# Patient Record
Sex: FEMALE | Race: WHITE | NOT HISPANIC OR LATINO | Employment: OTHER | URBAN - METROPOLITAN AREA
[De-identification: names, ages, dates, MRNs, and addresses within clinical notes are randomized per-mention and may not be internally consistent; named-entity substitution may affect disease eponyms.]

---

## 2021-10-16 ENCOUNTER — APPOINTMENT (OUTPATIENT)
Dept: GENERAL RADIOLOGY | Facility: CLINIC | Age: 86
DRG: 481 | End: 2021-10-16
Attending: EMERGENCY MEDICINE
Payer: COMMERCIAL

## 2021-10-16 ENCOUNTER — HOSPITAL ENCOUNTER (INPATIENT)
Facility: CLINIC | Age: 86
LOS: 4 days | Discharge: NURSING, REHAB OR LONG-TERM CARE | DRG: 481 | End: 2021-10-20
Attending: EMERGENCY MEDICINE | Admitting: INTERNAL MEDICINE
Payer: COMMERCIAL

## 2021-10-16 DIAGNOSIS — I10 BENIGN ESSENTIAL HYPERTENSION: Primary | ICD-10-CM

## 2021-10-16 DIAGNOSIS — K30 INDIGESTION: ICD-10-CM

## 2021-10-16 DIAGNOSIS — S72.001A FRACTURE OF HIP, RIGHT, CLOSED, INITIAL ENCOUNTER (H): ICD-10-CM

## 2021-10-16 DIAGNOSIS — D64.9 ANEMIA, UNSPECIFIED TYPE: ICD-10-CM

## 2021-10-16 DIAGNOSIS — R01.1 HEART MURMUR: ICD-10-CM

## 2021-10-16 LAB
ABO/RH(D): NORMAL
ANION GAP SERPL CALCULATED.3IONS-SCNC: 5 MMOL/L (ref 3–14)
ANTIBODY SCREEN: NEGATIVE
BASOPHILS # BLD AUTO: 0 10E3/UL (ref 0–0.2)
BASOPHILS NFR BLD AUTO: 0 %
BUN SERPL-MCNC: 29 MG/DL (ref 7–30)
CALCIUM SERPL-MCNC: 8.6 MG/DL (ref 8.5–10.1)
CHLORIDE BLD-SCNC: 104 MMOL/L (ref 94–109)
CO2 SERPL-SCNC: 26 MMOL/L (ref 20–32)
CREAT SERPL-MCNC: 1.15 MG/DL (ref 0.52–1.04)
EOSINOPHIL # BLD AUTO: 0.2 10E3/UL (ref 0–0.7)
EOSINOPHIL NFR BLD AUTO: 2 %
ERYTHROCYTE [DISTWIDTH] IN BLOOD BY AUTOMATED COUNT: 11.9 % (ref 10–15)
GFR SERPL CREATININE-BSD FRML MDRD: 41 ML/MIN/1.73M2
GLUCOSE BLD-MCNC: 144 MG/DL (ref 70–99)
HCT VFR BLD AUTO: 34.6 % (ref 35–47)
HGB BLD-MCNC: 11.5 G/DL (ref 11.7–15.7)
IMM GRANULOCYTES # BLD: 0 10E3/UL
IMM GRANULOCYTES NFR BLD: 0 %
LYMPHOCYTES # BLD AUTO: 1.7 10E3/UL (ref 0.8–5.3)
LYMPHOCYTES NFR BLD AUTO: 19 %
MCH RBC QN AUTO: 33.6 PG (ref 26.5–33)
MCHC RBC AUTO-ENTMCNC: 33.2 G/DL (ref 31.5–36.5)
MCV RBC AUTO: 101 FL (ref 78–100)
MONOCYTES # BLD AUTO: 1 10E3/UL (ref 0–1.3)
MONOCYTES NFR BLD AUTO: 11 %
NEUTROPHILS # BLD AUTO: 5.7 10E3/UL (ref 1.6–8.3)
NEUTROPHILS NFR BLD AUTO: 68 %
NRBC # BLD AUTO: 0 10E3/UL
NRBC BLD AUTO-RTO: 0 /100
PLATELET # BLD AUTO: 242 10E3/UL (ref 150–450)
POTASSIUM BLD-SCNC: 3.9 MMOL/L (ref 3.4–5.3)
RBC # BLD AUTO: 3.42 10E6/UL (ref 3.8–5.2)
SARS-COV-2 RNA RESP QL NAA+PROBE: NEGATIVE
SODIUM SERPL-SCNC: 135 MMOL/L (ref 133–144)
SPECIMEN EXPIRATION DATE: NORMAL
WBC # BLD AUTO: 8.6 10E3/UL (ref 4–11)

## 2021-10-16 PROCEDURE — 99285 EMERGENCY DEPT VISIT HI MDM: CPT | Mod: 25

## 2021-10-16 PROCEDURE — 36415 COLL VENOUS BLD VENIPUNCTURE: CPT | Performed by: EMERGENCY MEDICINE

## 2021-10-16 PROCEDURE — 250N000013 HC RX MED GY IP 250 OP 250 PS 637: Performed by: EMERGENCY MEDICINE

## 2021-10-16 PROCEDURE — 250N000011 HC RX IP 250 OP 636: Performed by: INTERNAL MEDICINE

## 2021-10-16 PROCEDURE — 120N000001 HC R&B MED SURG/OB

## 2021-10-16 PROCEDURE — 71045 X-RAY EXAM CHEST 1 VIEW: CPT

## 2021-10-16 PROCEDURE — 36415 COLL VENOUS BLD VENIPUNCTURE: CPT | Performed by: INTERNAL MEDICINE

## 2021-10-16 PROCEDURE — 85025 COMPLETE CBC W/AUTO DIFF WBC: CPT | Performed by: EMERGENCY MEDICINE

## 2021-10-16 PROCEDURE — C9803 HOPD COVID-19 SPEC COLLECT: HCPCS

## 2021-10-16 PROCEDURE — 87635 SARS-COV-2 COVID-19 AMP PRB: CPT | Performed by: EMERGENCY MEDICINE

## 2021-10-16 PROCEDURE — 73502 X-RAY EXAM HIP UNI 2-3 VIEWS: CPT

## 2021-10-16 PROCEDURE — 99223 1ST HOSP IP/OBS HIGH 75: CPT | Mod: AI | Performed by: INTERNAL MEDICINE

## 2021-10-16 PROCEDURE — 250N000013 HC RX MED GY IP 250 OP 250 PS 637: Performed by: INTERNAL MEDICINE

## 2021-10-16 PROCEDURE — 86901 BLOOD TYPING SEROLOGIC RH(D): CPT | Performed by: EMERGENCY MEDICINE

## 2021-10-16 PROCEDURE — 80048 BASIC METABOLIC PNL TOTAL CA: CPT | Performed by: EMERGENCY MEDICINE

## 2021-10-16 RX ORDER — METOPROLOL SUCCINATE 25 MG/1
12.5 TABLET, EXTENDED RELEASE ORAL DAILY
COMMUNITY

## 2021-10-16 RX ORDER — SIMVASTATIN 40 MG
40 TABLET ORAL AT BEDTIME
COMMUNITY
End: 2024-08-19

## 2021-10-16 RX ORDER — ONDANSETRON 4 MG/1
4 TABLET, ORALLY DISINTEGRATING ORAL EVERY 6 HOURS PRN
Status: DISCONTINUED | OUTPATIENT
Start: 2021-10-16 | End: 2021-10-17

## 2021-10-16 RX ORDER — ONDANSETRON 2 MG/ML
4 INJECTION INTRAMUSCULAR; INTRAVENOUS EVERY 6 HOURS PRN
Status: DISCONTINUED | OUTPATIENT
Start: 2021-10-16 | End: 2021-10-17

## 2021-10-16 RX ORDER — ACETAMINOPHEN 500 MG
1000 TABLET ORAL 3 TIMES DAILY PRN
Status: DISCONTINUED | OUTPATIENT
Start: 2021-10-16 | End: 2021-10-16

## 2021-10-16 RX ORDER — ACETAMINOPHEN 500 MG
1000 TABLET ORAL
Status: DISCONTINUED | OUTPATIENT
Start: 2021-10-16 | End: 2021-10-17

## 2021-10-16 RX ORDER — LIDOCAINE 40 MG/G
CREAM TOPICAL
Status: DISCONTINUED | OUTPATIENT
Start: 2021-10-16 | End: 2021-10-18

## 2021-10-16 RX ORDER — HYDROMORPHONE HCL IN WATER/PF 6 MG/30 ML
0.4 PATIENT CONTROLLED ANALGESIA SYRINGE INTRAVENOUS
Status: DISCONTINUED | OUTPATIENT
Start: 2021-10-16 | End: 2021-10-17

## 2021-10-16 RX ORDER — NITROGLYCERIN 0.4 MG/1
0.4 TABLET SUBLINGUAL EVERY 5 MIN PRN
COMMUNITY

## 2021-10-16 RX ORDER — HYDROMORPHONE HCL IN WATER/PF 6 MG/30 ML
0.2 PATIENT CONTROLLED ANALGESIA SYRINGE INTRAVENOUS
Status: DISCONTINUED | OUTPATIENT
Start: 2021-10-16 | End: 2021-10-17

## 2021-10-16 RX ORDER — METOPROLOL SUCCINATE 25 MG/1
25 TABLET, EXTENDED RELEASE ORAL DAILY
Status: DISCONTINUED | OUTPATIENT
Start: 2021-10-17 | End: 2021-10-20 | Stop reason: HOSPADM

## 2021-10-16 RX ORDER — SERTRALINE HYDROCHLORIDE 25 MG/1
12.5 TABLET, FILM COATED ORAL DAILY
COMMUNITY
End: 2024-08-19

## 2021-10-16 RX ORDER — ALENDRONATE SODIUM 70 MG/1
70 TABLET ORAL
COMMUNITY
End: 2024-08-19

## 2021-10-16 RX ORDER — CEFAZOLIN SODIUM 2 G/100ML
2 INJECTION, SOLUTION INTRAVENOUS
Status: CANCELLED | OUTPATIENT
Start: 2021-10-16

## 2021-10-16 RX ORDER — CEFAZOLIN SODIUM 2 G/100ML
2 INJECTION, SOLUTION INTRAVENOUS SEE ADMIN INSTRUCTIONS
Status: CANCELLED | OUTPATIENT
Start: 2021-10-16

## 2021-10-16 RX ORDER — ACETAMINOPHEN 500 MG
500-1000 TABLET ORAL DAILY PRN
Status: ON HOLD | COMMUNITY
End: 2021-10-18

## 2021-10-16 RX ORDER — POLYETHYLENE GLYCOL 3350 17 G/17G
17 POWDER, FOR SOLUTION ORAL DAILY PRN
Status: DISCONTINUED | OUTPATIENT
Start: 2021-10-16 | End: 2021-10-20 | Stop reason: HOSPADM

## 2021-10-16 RX ORDER — LOSARTAN POTASSIUM 100 MG/1
100 TABLET ORAL DAILY
Status: ON HOLD | COMMUNITY
End: 2021-10-20

## 2021-10-16 RX ORDER — MULTIVIT-MIN/IRON/FOLIC ACID/K 18-600-40
1 CAPSULE ORAL DAILY
COMMUNITY

## 2021-10-16 RX ORDER — SODIUM CHLORIDE 9 MG/ML
INJECTION, SOLUTION INTRAVENOUS CONTINUOUS
Status: DISCONTINUED | OUTPATIENT
Start: 2021-10-16 | End: 2021-10-17

## 2021-10-16 RX ORDER — BISACODYL 10 MG
10 SUPPOSITORY, RECTAL RECTAL DAILY PRN
Status: DISCONTINUED | OUTPATIENT
Start: 2021-10-16 | End: 2021-10-17

## 2021-10-16 RX ORDER — METOPROLOL SUCCINATE 25 MG/1
25 TABLET, EXTENDED RELEASE ORAL ONCE
Status: COMPLETED | OUTPATIENT
Start: 2021-10-16 | End: 2021-10-16

## 2021-10-16 RX ORDER — LEVOTHYROXINE SODIUM 88 UG/1
88 TABLET ORAL DAILY
COMMUNITY

## 2021-10-16 RX ADMIN — ONDANSETRON 4 MG: 4 TABLET, ORALLY DISINTEGRATING ORAL at 18:13

## 2021-10-16 RX ADMIN — ACETAMINOPHEN 1000 MG: 500 TABLET, FILM COATED ORAL at 18:13

## 2021-10-16 RX ADMIN — METOPROLOL SUCCINATE 25 MG: 25 TABLET, EXTENDED RELEASE ORAL at 11:11

## 2021-10-16 RX ADMIN — Medication 1 MG: at 20:56

## 2021-10-16 NOTE — ED NOTES
Bed: ED05  Expected date:   Expected time:   Means of arrival:   Comments[facility]  [facility]

## 2021-10-16 NOTE — ED NOTES
Per Dr. Tirado from O, patient's surgical date will be known within an hour or so. Will update patient once information is provided. Patient NPO at this time.

## 2021-10-16 NOTE — ED NOTES
Per Candida Bridges, patient's surgery to happen tomorrow due to heavy case load today. Pt may eat today but will be NPO after midnight tonight.

## 2021-10-16 NOTE — ED TRIAGE NOTES
Pt complains of right sided hip pain after falling at 2000 last night.  Pt's right hip is shortened and externally rotated.

## 2021-10-16 NOTE — PLAN OF CARE
Patient transferred from ED @ 1400. A&O x4.  Bedrest. Denies pain @ rest, offered pain meds, patient refused. Poor appetite. Incontinent of bladder, purewick in place. NPO after midnight for surgery. Will continue to monitor.

## 2021-10-16 NOTE — ED NOTES
"[facility]  ED Nurse Handoff Report    ED Chief complaint: Hip Pain (Pt complains of right sided hip pain after falling at 2000 last night.  Pt's right hip is shortened and externally rotated.)      ED Diagnosis:   Final diagnoses:   Fracture of hip, right, closed, initial encounter (H)   Anemia, unspecified type   Heart murmur       Code Status: Not on file    Allergies:   Allergies   Allergen Reactions     Procaine      Other reaction(s): Tachycardia     Tetanus Toxoids      Latex      Possible allergy historically per patient     Tetanus Antitoxin      Other reaction(s): Edema  Arm doubled in size       Patient Story: Pt presents from nursing home via EMS after a fall at 8pm last night.  Pt has pain and deformity to right leg post fall.  Focused Assessment:  Pt reports an unwitnessed fall last night at 8 pm.  Pt right leg shortened and externally rotated, pt pain controlled after getting 70 mcg of fentanyl IV from EMS.  X ray shows \"    Comminuted intertrochanteric fracture of the right femur, with impaction and moderate varus angulation\".  Pt given 25 mg PO metoprolol XL per hospitalist request.  Daughter at bedside, involved in cares.  Covid PCR negative.  Treatments and/or interventions provided: See above  Patient's response to treatments and/or interventions: See above    To be done/followed up on inpatient unit:  Surgery tomorrow?    Does this patient have any cognitive concerns?: None    Activity level - Baseline/Home:  Independent   Activity Level - Current:   Total cares.    Patient's Preferred language: English   Needed?: No    Isolation: None  Infection: Not Applicable  Patient tested for COVID 19 prior to admission: YES  Bariatric?: No    Vital Signs:   Vitals:    10/16/21 0842 10/16/21 0846   BP:  (!) 187/101   Pulse: 95    Resp: 16    Temp: 98.1  F (36.7  C)    SpO2: 97%    Weight: 63.5 kg (140 lb)        Cardiac Rhythm:     Was the PSS-3 completed:   Yes  What " interventions are required if any?               Family Comments: Daughter at bedside, involved in cares.  OBS brochure/video discussed/provided to patient/family: N/A              Name of person given brochure if not patient: NA              Relationship to patient: NA    For the majority of the shift this patient's behavior was Green.   Behavioral interventions performed were NA.    ED NURSE PHONE NUMBER: *[phone]

## 2021-10-16 NOTE — H&P
[facility]  Hospitalist History and Physical    Name: Katrina Portillo    MRN: [id number]  YOB: 1929    Age: 89 year old  Date of Admission:  10/16/2021  Physician:  Stanley Rivera DO, [facility]    Assessment & Plan   Katrina Portillo is a 89 year old female who presented to the [facility] ER with right hip pain after a slip/fall.  She denies striking her head or having LOC.    Right hip fracture with mechanical slip/fall:  -  Bedrest  -  Pain control (tylenol + PRN narcotics)  -  Leg monitoring/neuro checks  -  NPO, ortho consult - ED provider spoke with orthopedics who tentatively are planning surgery later today as schedule allows.  -  PCD's initially  -  If surgery cannot be scheduled today, will then start diet and make NPO again at midnight  -  Check pre-op EKG (no acute cardiac complaints recently.  Has a chronic murmur that has been stable and not an issue per patient/family)    HTN:  -  BP high in ED even when pain more controlled.  She did not take her metoprolol last evening with her fall or today.  I discussed this with the ED provider and it will be started in the ED.  Hold on losartan until we see BP trend.  Likely will resume post-op with hold parameters.    Hypothyroidism:  -  Levothyroxine can be resumed post-op    Depression:  -  Hold selective serotonin reuptake inhibitor for now while NPO    History of murmur:  -  Hasn't wanted further eval on this.  Prior had an echo several years ago without major valve disease noted (was mild).  Also had negative stress test with normal heart function in 2017.  No recent cardiac complaints or worsening SOB.  -  EKG pending as above    MONCHO:  -  History of some sleep apnea.  Not tolerant of CPAP.  Improved/controls with sleeping on side.    Mildly Abnormal pre-op CXR:  -  Appears more fibrosis/old scarring.  No acute respiratory complaints.  O2 sats good on room air.    COVID screen:  Negative ED testing    DVT Prophylaxis:  Pneumatic Compression Devices  Code Status: Full Code confirmed with patient/daughter present    Disposition: Expected discharge in 2+ days, may need TCU/rehab.    Primary Care Physician   Khadra Hernandez, [phone]    Chief Complaint   Fall with hip pain    History is obtained from the patient and her daughter.  I also spoke with the ER provider about the history.     History of Present Illness   Katrina Portillo is a 89 year old female who presents with right hip pain.  Last night she slipped/fell.  She has had a couple falls the past month (slips/mechanical).  This fall resulted in her falling on her right hip.  She did not strike her head or have LOC.  She did not have other major pain/injuries noted.  She pressed her medical alert button and staff responded.  Her daughter also checked on her.  She was placed back in bed and as pain improved/controlled she preferred to rest in bed overnight before coming to the ED.  This AM she presented to the ED due to ongoing pain and was noted to have a right hip fracture.  She has otherwise felt well recently.  No chest pain, sob, nausea, fevers, other new complaints.    Past Medical History    Anxiety 12/16/2020   Low serum vitamin B12 12/16/2020   Elevated MCV 02/18/2019   Overview:     Formatting of this note might be different from the original.  2/19 was 102. Vit B12 290. Told patient to start 1000 mcg a day to see if balance improves. Follow up in 3 months.  6/20 on televisits, she reports not getting the letter I sen, so started B12 then. Feels balance is better at 9/20 visit.      Gait abnormality 02/13/2019   Hypothyroid 11/06/2012   Age-related osteoporosis without current pathological fracture 11/06/2012   Overview:     Formatting of this note might be different from the original.  Dexa 4/2010 Spine -1.4, R hip -2.1, L hip -2.2 Stable from 2007, Vitamin D=52 Right wrist fracture 2012 1/18 Right neck -2.4/total -2.3, left neck -2.0/total -1.7, left radius  -2.9. Pelvic fracture 1/18.Started Alendronate 2/18.     DJD (degenerative joint disease) of knee 01/16/2012   Benign soft systolic murmur 01/07/2011   HTN (hypertension) 02/11/2010   Dyslipidemia 02/11/2010   Sleep apnea 02/11/2010   Overview:     Formatting of this note might be different from the original.  9/20 Was diagnosed with sleep apnea(not sure of the year), doesn't have daytime somnolence. Didn't tolerate CPAP, but sleeps on side. Wakes rested.      Villous adenoma of right colon         Past Surgical History   No past surgical history on file.     Prior to Admission Medications   Prior to Admission Medications   Prescriptions Last Dose Informant Patient Reported? Taking?   CALCIUM PO 10/15/2021 at am Daughter Yes Yes   Sig: Take 1 tablet by mouth daily   Vitamin D, Cholecalciferol, 25 MCG (1000 UT) TABS 10/15/2021 at am Daughter Yes Yes   Sig: Take 1 tablet by mouth daily   acetaminophen (TYLENOL) 500 MG tablet Past Week at Unknown time Daughter Yes Yes   Sig: Take 500-1,000 mg by mouth daily as needed for mild pain   alendronate (FOSAMAX) 70 MG tablet 10/11/2021 at Unknown time Daughter Yes Yes   Sig: Take 70 mg by mouth every 7 days On Mondays   levothyroxine (SYNTHROID/LEVOTHROID) 88 MCG tablet 10/15/2021 at am Daughter Yes Yes   Sig: Take 88 mcg by mouth daily   losartan (COZAAR) 100 MG tablet 10/15/2021 at am Daughter Yes Yes   Sig: Take 100 mg by mouth daily   metoprolol succinate ER (TOPROL-XL) 25 MG 24 hr tablet 10/15/2021 at am Daughter Yes Yes   Sig: Take 25 mg by mouth daily   nitroGLYcerin (NITROSTAT) 0.4 MG sublingual tablet prn at prn Daughter Yes Yes   Sig: Place 0.4 mg under the tongue every 5 minutes as needed for chest pain For chest pain place 1 tablet under the tongue every 5 minutes for 3 doses. If symptoms persist 5 minutes after 1st dose call 911.   sertraline (ZOLOFT) 25 MG tablet 10/14/2021 at pm  Daughter Yes Yes   Sig: Take 12.5 mg by mouth daily   simvastatin (ZOCOR) 40 MG  tablet 10/14/2021 at pm Daughter Yes Yes   Sig: Take 40 mg by mouth At Bedtime      Facility-Administered Medications: None     Allergies   Allergies   Allergen Reactions     Procaine      Other reaction(s): Tachycardia     Tetanus Toxoids      Latex      Possible allergy historically per patient     Tetanus Antitoxin      Other reaction(s): Edema  Arm doubled in size       Social History   No smoking or drinking frequent alcohol.  Lives in assisted setting.  Daughter nearby assists in her care.  Patient baseline fairly active.  She has had a couple recent falls as noted above.      Family History   Reviewed, non-contributory.    Review of Systems   A Comprehensive greater than 10 system review of systems was carried out.  Pertinent positives and negatives are noted above.  Otherwise negative for contributory information.    Physical Exam   Temp: 98.1  F (36.7  C)   BP: (!) 187/101 Pulse: 95   Resp: 16 SpO2: 97 %      Vital Signs with Ranges  Temp:  [98.1  F (36.7  C)] 98.1  F (36.7  C)  Pulse:  [95] 95  Resp:  [16] 16  BP: (187)/(101) 187/101  SpO2:  [97 %] 97 %  140 lbs 0 oz    GEN:  Alert, oriented, appears comfortable at rest, no overt distress  HEENT:  Normocephalic/atraumatic, no scleral icterus, no nasal discharge, mouth moist.  CV:  Regular rate and rhythm, I-II/VI systolic murmur, no rub.  LUNGS:  Clear to auscultation bilaterally without rales/rhonchi/wheezing/retractions.  Symmetric chest rise on inhalation noted.  ABD:  Active bowel sounds, soft, non-tender/non-distended.  No rebound/guarding/rigidity.  EXT:  No edema.  No cyanosis.  Moves feet well, no visible break in skin over fracture region.  SKIN:  Dry to touch, no exanthems noted in the visualized areas.  NEURO:  Moving feet well, sensation to touch feet/legs grossly intact.  Moving upper extremities well.  No new focal deficits appreciated.      Data   Data reviewed today:  I personally reviewed (EKG pending which I will review).    Recent Labs    Lab 10/16/21  0856   WBC 8.6   HGB 11.5*   HCT 34.6*   *        Recent Labs   Lab 10/16/21  0856      POTASSIUM 3.9   CHLORIDE 104   CO2 26   ANIONGAP 5   *   BUN 29   CR 1.15*   GFRESTIMATED 41*   BOGDAN 8.6       Recent Results (from the past 24 hour(s))   XR Chest 1 View    Narrative    EXAM: XR CHEST 1 VIEW  LOCATION: [facility]  DATE/TIME: 10/16/2021, 9:18 AM    INDICATION: Fall, leg injury, preop.  COMPARISON: None.    FINDINGS: Patient is mildly rotated right anterior oblique. Skinfold overlies the right apex. The cardiomediastinal silhouette and pulmonary vasculature are within normal limits. Aortic calcification. Mild bibasilar coarse interstitial opacities. No   pleural effusion or pneumothorax. Cholecystectomy. Diffuse osteopenia.      Impression    IMPRESSION: Mild bibasilar coarse interstitial opacities are favored to represent scarring/fibrosis, although pneumonitis is difficult to exclude.     XR Pelvis w Hip Right 1 View    Narrative    EXAM: XR PELVIS AND HIP RIGHT 1 VIEW  LOCATION: [facility]  DATE/TIME: 10/16/2021, 9:19 AM    INDICATION: Right-sided hip pain.  COMPARISON: None.      Impression    IMPRESSION:  1.  Comminuted intertrochanteric fracture of the right femur, with impaction and moderate varus angulation.  2.  Old healed fracture of the right inferior pubic ramus.  3.  No joint malalignment.  4.  Degenerative changes in the lower lumbar spine and sacroiliac joints.  5.  Bone demineralization.

## 2021-10-16 NOTE — PROGRESS NOTES
Ortho aware of the patient.   Formal consult to follow.      Plan for ORIF of right intertrochanteric fracture tomorrow as OR schedule allows.   Diet today and NPO p MN  Bedrest  Pain control PRN  STAT COVID test  Pre-op optimization per hospitalist    Mary Woodward PA-C on 10/16/2021 at 2:13 PM   Orthopedics

## 2021-10-16 NOTE — PHARMACY-ADMISSION MEDICATION HISTORY
Pharmacy Medication History  Admission medication history interview status for the 10/16/2021  admission is complete. See EPIC admission navigator for prior to admission medications     Location of Interview: Patient room  Medication history sources: Patient, Patient's family/friend (Daughter), Surescripts and Care Everywhere    Significant changes made to the medication list:  Added all medications on the list.     In the past week, patient estimated taking medication this percent of the time: greater than 90%      Medication reconciliation completed by provider prior to medication history? Yes    Time spent in this activity: 20 minutes    Prior to Admission medications    Medication Sig Last Dose Taking? Auth Provider   acetaminophen (TYLENOL) 500 MG tablet Take 500-1,000 mg by mouth daily as needed for mild pain Past Week at Unknown time Yes Unknown, Entered By History   alendronate (FOSAMAX) 70 MG tablet Take 70 mg by mouth every 7 days On Mondays 10/11/2021 at Unknown time Yes Unknown, Entered By History   CALCIUM PO Take 1 tablet by mouth daily 10/15/2021 at am Yes Unknown, Entered By History   levothyroxine (SYNTHROID/LEVOTHROID) 88 MCG tablet Take 88 mcg by mouth daily 10/15/2021 at am Yes Unknown, Entered By History   losartan (COZAAR) 100 MG tablet Take 100 mg by mouth daily 10/15/2021 at am Yes Unknown, Entered By History   metoprolol succinate ER (TOPROL-XL) 25 MG 24 hr tablet Take 25 mg by mouth daily 10/15/2021 at am Yes Unknown, Entered By History   nitroGLYcerin (NITROSTAT) 0.4 MG sublingual tablet Place 0.4 mg under the tongue every 5 minutes as needed for chest pain For chest pain place 1 tablet under the tongue every 5 minutes for 3 doses. If symptoms persist 5 minutes after 1st dose call 911. prn at prn Yes Unknown, Entered By History   sertraline (ZOLOFT) 25 MG tablet Take 12.5 mg by mouth daily 10/14/2021 at pm  Yes Unknown, Entered By History   simvastatin (ZOCOR) 40 MG tablet Take 40 mg by  mouth At Bedtime 10/14/2021 at pm Yes Unknown, Entered By History   Vitamin D, Cholecalciferol, 25 MCG (1000 UT) TABS Take 1 tablet by mouth daily 10/15/2021 at am Yes Unknown, Entered By History       The information provided in this note is only as accurate as the sources available at the time of update(s)

## 2021-10-16 NOTE — CONSULTS
[facility]    Orthopedic Consultation    Katrina Portillo MRN# [id number]   Age: 89 year old YOB: 1929     Date of Admission:  10/16/2021    Reason for consult: R hip intertroch fracture       Requesting physician: Nicole       Level of consult: Consult, follow and place orders           Assessment and Plan:   Assessment:   R hip displaced intertroch fracture        Plan:   Plan for R hip cepahlomedullary nail  Plan for OR tomorrow given no OR availability today  NPO after midnight  Medically optimize for surgery  Pain control prn           Chief Complaint:   R hip intertroch fx         History of Present Illness:   This patient is a 89 year old female who presents with the following condition requiring a hospital admission:    89 yo F who had a fall out of bed last night and she landed on her R hip. She had immediate pain and inability to bear weight. No other injuries. She denies any numbness/tingling. Pain is worsened with any attempted ROM or movement of the R hip. Pain is severe in nature.          Past Medical History:   No past medical history on file.          Past Surgical History:   No past surgical history on file.          Social History:     Social History     Tobacco Use     Smoking status: Not on file   Substance Use Topics     Alcohol use: Not on file             Family History:   No family history on file.          Immunizations:     VACCINE/DOSE   Diptheria   DPT   DTAP   HBIG   Hepatitis A   Hepatitis B   HIB   Influenza   Measles   Meningococcal   MMR   Mumps   Pneumococcal   Polio   Rubella   Small Pox   TDAP   Varicella   Zoster             Allergies:     Allergies   Allergen Reactions     Procaine      Other reaction(s): Tachycardia     Tetanus Toxoids      Latex      Possible allergy historically per patient     Tetanus Antitoxin      Other reaction(s): Edema  Arm doubled in size             Medications:     Current Facility-Administered Medications    Medication     acetaminophen (TYLENOL) tablet 1,000 mg     bisacodyl (DULCOLAX) Suppository 10 mg     HOLD: ALL Anticoagulant medications until AFTER surgery     HYDROmorphone (DILAUDID) injection 0.2 mg    Or     HYDROmorphone (DILAUDID) injection 0.4 mg     lidocaine (LMX4) cream     lidocaine 1 % 0.1-1 mL     melatonin tablet 1 mg     [START ON 10/17/2021] metoprolol succinate ER (TOPROL-XL) 24 hr tablet 25 mg     ondansetron (ZOFRAN-ODT) ODT tab 4 mg    Or     ondansetron (ZOFRAN) injection 4 mg     polyethylene glycol (MIRALAX) Packet 17 g     sodium chloride (PF) 0.9% PF flush 3 mL     sodium chloride (PF) 0.9% PF flush 3 mL     sodium chloride 0.9% infusion             Review of Systems:   All review of systems was performed and was negative except as per hpi            Physical Exam:   All vitals have been reviewed  Patient Vitals for the past 24 hrs:   BP Temp Temp src Pulse Resp SpO2 Weight   10/16/21 1518 133/70 98.7  F (37.1  C) Oral 91 18 97 % --   10/16/21 1430 -- -- -- -- 16 -- --   10/16/21 1339 (!) 144/90 98.7  F (37.1  C) -- 93 17 99 % --   10/16/21 1130 (!) 150/80 -- -- 94 -- 97 % --   10/16/21 1111 -- -- -- -- -- 98 % --   10/16/21 1110 (!) 116/104 -- -- 91 -- 92 % --   10/16/21 0846 (!) 187/101 -- -- -- -- -- --   10/16/21 0842 -- 98.1  F (36.7  C) -- 95 16 97 % 63.5 kg (140 lb)     No intake or output data in the 24 hours ending 10/16/21 1556  NAD  nonlabored breathing  No peripheral edema  Skin intact  White sclera  RLE:  +pain with logroll  +Df/PF/EHL  Sensation intact throughout  Skin intact            Data:   All laboratory data reviewed  Results for orders placed or performed during the hospital encounter of 10/16/21   XR Chest 1 View     Status: None    Narrative    EXAM: XR CHEST 1 VIEW  LOCATION: [facility]  DATE/TIME: 10/16/2021, 9:18 AM    INDICATION: Fall, leg injury, preop.  COMPARISON: None.    FINDINGS: Patient is mildly rotated right anterior oblique.  Skinfold overlies the right apex. The cardiomediastinal silhouette and pulmonary vasculature are within normal limits. Aortic calcification. Mild bibasilar coarse interstitial opacities. No   pleural effusion or pneumothorax. Cholecystectomy. Diffuse osteopenia.      Impression    IMPRESSION: Mild bibasilar coarse interstitial opacities are favored to represent scarring/fibrosis, although pneumonitis is difficult to exclude.     XR Pelvis w Hip Right 1 View     Status: None    Narrative    EXAM: XR PELVIS AND HIP RIGHT 1 VIEW  LOCATION: [facility]  DATE/TIME: 10/16/2021, 9:19 AM    INDICATION: Right-sided hip pain.  COMPARISON: None.      Impression    IMPRESSION:  1.  Comminuted intertrochanteric fracture of the right femur, with impaction and moderate varus angulation.  2.  Old healed fracture of the right inferior pubic ramus.  3.  No joint malalignment.  4.  Degenerative changes in the lower lumbar spine and sacroiliac joints.  5.  Bone demineralization.     Basic metabolic panel     Status: Abnormal   Result Value Ref Range    Sodium 135 133 - 144 mmol/L    Potassium 3.9 3.4 - 5.3 mmol/L    Chloride 104 94 - 109 mmol/L    Carbon Dioxide (CO2) 26 20 - 32 mmol/L    Anion Gap 5 3 - 14 mmol/L    Urea Nitrogen 29 7 - 30 mg/dL    Creatinine 1.15 (H) 0.52 - 1.04 mg/dL    Calcium 8.6 8.5 - 10.1 mg/dL    Glucose 144 (H) 70 - 99 mg/dL    GFR Estimate 41 (L) >60 mL/min/1.73m2   Asymptomatic COVID-19 Virus (Coronavirus) by PCR Nasopharyngeal     Status: Normal    Specimen: Nasopharyngeal; Swab   Result Value Ref Range    SARS CoV2 PCR Negative Negative    Narrative    Testing was performed using the arash  SARS-CoV-2 & Influenza A/B Assay on the arash  Amanda  System.  This test should be ordered for the detection of SARS-COV-2 in individuals who meet SARS-CoV-2 clinical and/or epidemiological criteria. Test performance is unknown in asymptomatic patients.  This test is for in vitro diagnostic use  under the FDA EUA for laboratories certified under CLIA to perform moderate and/or high complexity testing. This test has not been FDA cleared or approved.  A negative test does not rule out the presence of PCR inhibitors in the specimen or target RNA in concentration below the limit of detection for the assay. The possibility of a false negative should be considered if the patient's recent exposure or clinical presentation suggests COVID-19.  [facility] are certified under the Clinical Laboratory Improvement Amendments of 1988 (CLIA-88) as qualified to perform moderate and/or high complexity laboratory testing.   CBC with platelets and differential     Status: Abnormal   Result Value Ref Range    WBC Count 8.6 4.0 - 11.0 10e3/uL    RBC Count 3.42 (L) 3.80 - 5.20 10e6/uL    Hemoglobin 11.5 (L) 11.7 - 15.7 g/dL    Hematocrit 34.6 (L) 35.0 - 47.0 %     (H) 78 - 100 fL    MCH 33.6 (H) 26.5 - 33.0 pg    MCHC 33.2 31.5 - 36.5 g/dL    RDW 11.9 10.0 - 15.0 %    Platelet Count 242 150 - 450 10e3/uL    % Neutrophils 68 %    % Lymphocytes 19 %    % Monocytes 11 %    % Eosinophils 2 %    % Basophils 0 %    % Immature Granulocytes 0 %    NRBCs per 100 WBC 0 <1 /100    Absolute Neutrophils 5.7 1.6 - 8.3 10e3/uL    Absolute Lymphocytes 1.7 0.8 - 5.3 10e3/uL    Absolute Monocytes 1.0 0.0 - 1.3 10e3/uL    Absolute Eosinophils 0.2 0.0 - 0.7 10e3/uL    Absolute Basophils 0.0 0.0 - 0.2 10e3/uL    Absolute Immature Granulocytes 0.0 <=0.0 10e3/uL    Absolute NRBCs 0.0 10e3/uL   Adult Type and Screen     Status: None   Result Value Ref Range    ABO/RH(D) O POS     Antibody Screen Negative Negative    SPECIMEN EXPIRATION DATE [id number]    CBC with platelets differential     Status: Abnormal    Narrative    The following orders were created for panel order CBC with platelets differential.  Procedure                               Abnormality         Status                     ---------                                -----------         ------                     CBC with platelets and d...[[id number]]  Abnormal            Final result                 Please view results for these tests on the individual orders.   ABO/Rh type and screen     Status: None    Narrative    The following orders were created for panel order ABO/Rh type and screen.  Procedure                               Abnormality         Status                     ---------                               -----------         ------                     Adult Type and Screen[[id number]]                            Final result                 Please view results for these tests on the individual orders.   ABO/Rh type and screen *Canceled*     Status: None ()    Narrative    The following orders were created for panel order ABO/Rh type and screen.  Procedure                               Abnormality         Status                     ---------                               -----------         ------                       Please view results for these tests on the individual orders.   ABO/Rh type and screen *Canceled*     Status: None ()    Narrative    The following orders were created for panel order ABO/Rh type and screen.  Procedure                               Abnormality         Status                     ---------                               -----------         ------                     Adult Type and Screen[[id number]]                                                         Please view results for these tests on the individual orders.          Attestation:  Amount of time performed on this consult: 15 minutes.    Benjy Tirado MD

## 2021-10-16 NOTE — ED PROVIDER NOTES
History   Chief Complaint:  R hip pain    HPI   History supplemented by electronic chart review, EMS, and daughter at bedside    Katrina Portillo is a 89 year old female who presents by EMS for evaluation of a right hip injury.  She lives in an assisted living facility and normally ambulates with a walker.  Around 8 PM last night, she states that she lost her balance while ambulating with her walker, and fell to the ground, landing on her right hip causing severe nonradiating pain worse with movement.  She hit her pendant, and was tended to prompted by her staff who put her back in bed.  Her daughter was contacted, who happens to be a retired nurse, they chose to let her rest in bed overnight, expecting that nothing would get done emergently in the hospital, and plan to have her transported to the hospital this morning for further assessment.  She specifically did not lose consciousness, hit her head, demonstrate confusion, breathing symptoms, chest pain, abdominal pain, or other injuries.  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  She was given 70 mcg of fentanyl by EMS which provoke some nausea but did relieve her discomfort.  She is not on blood thinners.  She has been previously diagnosed with a heart murmur of unclear etiology.  She has had anesthesia in the past with no problems.  She has been feeling her normal state of health lately.    Review of Systems  All other systems reviewed and negative except as above in HPI.    Allergies:  Procaine  Tetanus Toxoids  Latex  Tetanus Antitoxin     Medications:    acetaminophen (TYLENOL) 500 MG tablet  alendronate (FOSAMAX) 70 MG tablet  CALCIUM PO  levothyroxine (SYNTHROID/LEVOTHROID) 88 MCG tablet  losartan (COZAAR) 100 MG tablet  metoprolol succinate ER (TOPROL-XL) 25 MG 24 hr tablet  nitroGLYcerin (NITROSTAT) 0.4 MG  sublingual tablet  sertraline (ZOLOFT) 25 MG tablet  simvastatin (ZOCOR) 40 MG tablet  Vitamin D, Cholecalciferol, 25 MCG (1000 UT) TABS        Past Medical History:    No past medical history on file.    Patient Active Problem List    Diagnosis Date Noted     Heart murmur 10/16/2021     Priority: Medium     Fracture of hip, right, closed, initial encounter (H) 10/16/2021     Priority: Medium     Anemia, unspecified type 10/16/2021     Priority: Medium        Past Surgical History:    No past surgical history on file.     Family History:    family history is not on file.    Social History:     Daughter is a retired nurse at [employer].  Physical Exam     Patient Vitals for the past 24 hrs:   BP Temp Pulse Resp SpO2 Weight   10/16/21 0846 (!) 187/101 -- -- -- -- --   10/16/21 0842 -- 98.1  F (36.7  C) 95 16 97 % 63.5 kg (140 lb)        Physical Exam  General: Nontoxic appearing woman sitting upright in room 5, daughter later at bedside  HENT: MMM, no signs of facial trauma  CV:  regular rhythm, soft compartments in all extremities, cap refill normal, normal R DP and PT pulses, +3/6 murmur   Resp: normal effort, speaks in full phrases, no stridor, no cough observed  GI: abdomen soft, nontender  MSK:  Spine: nontender  Extremities: Tenderness to right hip and upper leg with external rotation and shortening of right leg.  Remainder of right lower extremity is nontender.  Skin:   No abrasion  No ecchymosis  No laceration  Neuro: awake, alert, GCS 15, responds appropriately to commands, SILT all extremities  Psych: cooperative    Emergency Department Course     Imaging:    XR Pelvis w Hip Right 1 View   Final Result   IMPRESSION:   1.  Comminuted intertrochanteric fracture of the right femur, with impaction and moderate varus angulation.   2.  Old healed fracture of the right inferior pubic ramus.   3.  No joint malalignment.   4.  Degenerative changes in the lower lumbar spine and sacroiliac joints.   5.  Bone  demineralization.         XR Chest 1 View   Final Result   IMPRESSION: Mild bibasilar coarse interstitial opacities are favored to represent scarring/fibrosis, although pneumonitis is difficult to exclude.            Laboratory:  Labs Ordered and Resulted from Time of ED Arrival Up to the Time of Departure from the ED   BASIC METABOLIC PANEL - Abnormal; Notable for the following components:       Result Value    Creatinine 1.15 (*)     Glucose 144 (*)     GFR Estimate 41 (*)     All other components within normal limits   CBC WITH PLATELETS AND DIFFERENTIAL - Abnormal; Notable for the following components:    RBC Count 3.42 (*)     Hemoglobin 11.5 (*)     Hematocrit 34.6 (*)      (*)     MCH 33.6 (*)     All other components within normal limits   COVID-19 VIRUS (CORONAVIRUS) BY PCR - Normal    Narrative:     Testing was performed using the arash  SARS-CoV-2 & Influenza A/B Assay on the arash  Amanda  System.  This test should be ordered for the detection of SARS-COV-2 in individuals who meet SARS-CoV-2 clinical and/or epidemiological criteria. Test performance is unknown in asymptomatic patients.  This test is for in vitro diagnostic use under the FDA EUA for laboratories certified under CLIA to perform moderate and/or high complexity testing. This test has not been FDA cleared or approved.  A negative test does not rule out the presence of PCR inhibitors in the specimen or target RNA in concentration below the limit of detection for the assay. The possibility of a false negative should be considered if the patient's recent exposure or clinical presentation suggests COVID-19.  [facility] are certified under the Clinical Laboratory Improvement Amendments of 1988 (CLIA-88) as qualified to perform moderate and/or high complexity laboratory testing.   PERIPHERAL IV CATHETER   CARDIAC CONTINUOUS MONITORING   VITAL SIGNS   TYPE AND SCREEN, ADULT   CBC WITH PLATELETS & DIFFERENTIAL    Narrative:      The following orders were created for panel order CBC with platelets differential.  Procedure                               Abnormality         Status                     ---------                               -----------         ------                     CBC with platelets and d...[[id number]]  Abnormal            Final result                 Please view results for these tests on the individual orders.   ABO/RH TYPE AND SCREEN    Narrative:     The following orders were created for panel order ABO/Rh type and screen.  Procedure                               Abnormality         Status                     ---------                               -----------         ------                     Adult Type and Screen[[id number]]                            Final result                 Please view results for these tests on the individual orders.        Emergency Department Course:  Reviewed:  I reviewed nursing notes, vitals and past medical history    Assessments:  I obtained history and examined the patient as noted above.     0920, and again 0958  I rechecked the patient and explained findings.     Consults:   At 09 36, I spoke with Dr. Tirado, orthopedics.  At 09 55, I spoke with Dr. Rivera, hospitalist.    Interventions:  Medications   metoprolol succinate ER (TOPROL-XL) 24 hr tablet 25 mg (has no administration in time range)        Disposition:  Admitted to Hospitalist    Impression & Plan    Covid-19  Yamilet Maradiaga was evaluated during a global COVID-19 pandemic, which necessitated consideration that the patient might be at risk for infection with the SARS-CoV-2 virus that causes COVID-19.   Applicable protocols for evaluation were followed during the patient's care.   COVID-19 was considered as part of the patient's evaluation. The plan for testing is:  a test was obtained during this visit.    Medical Decision Making:  This very pleasant woman presents with isolated injury to her right hip, which is found to  have fracture as documented above.  She is neurologically at her baseline.  She was given opioid analgesia parenterally by EMS and has not desired anything further at this time.  She will require surgical intervention which was discussed with the patient and her daughter, and I spoke with orthopedics who will tentatively plan to operate either later today or tomorrow.  She will be kept n.p.o. to keep the option of surgery today available.  No immediately dangerous medical conditions were identified or suspected, noting that her blood pressure is somewhat elevated in the setting of not having had her antihypertensive medication last night.  A dose of this was administered after discussion with the accepting hospitalist.  She is admitted to the hospital service for further multidisciplinary care.    Diagnosis:    ICD-10-CM    1. Fracture of hip, right, closed, initial encounter (H)  S72.001A    2. Anemia, unspecified type  D64.9    3. Heart murmur  R01.1          10/16/2021   DAVE Barrera MD    This note was completed in part using Dragon voice recognition software. Although reviewed after completion, some word and grammatical errors may occur.           Tremaine Barrera MD  10/16/21 1052

## 2021-10-17 ENCOUNTER — ANESTHESIA EVENT (OUTPATIENT)
Dept: SURGERY | Facility: CLINIC | Age: 86
DRG: 481 | End: 2021-10-17
Payer: COMMERCIAL

## 2021-10-17 ENCOUNTER — APPOINTMENT (OUTPATIENT)
Dept: GENERAL RADIOLOGY | Facility: CLINIC | Age: 86
DRG: 481 | End: 2021-10-17
Attending: INTERNAL MEDICINE
Payer: COMMERCIAL

## 2021-10-17 ENCOUNTER — ANESTHESIA (OUTPATIENT)
Dept: SURGERY | Facility: CLINIC | Age: 86
DRG: 481 | End: 2021-10-17
Payer: COMMERCIAL

## 2021-10-17 LAB
ANION GAP SERPL CALCULATED.3IONS-SCNC: 5 MMOL/L (ref 3–14)
ATRIAL RATE - MUSE: 87 BPM
BUN SERPL-MCNC: 25 MG/DL (ref 7–30)
CALCIUM SERPL-MCNC: 8.5 MG/DL (ref 8.5–10.1)
CHLORIDE BLD-SCNC: 104 MMOL/L (ref 94–109)
CO2 SERPL-SCNC: 26 MMOL/L (ref 20–32)
CREAT SERPL-MCNC: 1.09 MG/DL (ref 0.52–1.04)
DIASTOLIC BLOOD PRESSURE - MUSE: NORMAL MMHG
ERYTHROCYTE [DISTWIDTH] IN BLOOD BY AUTOMATED COUNT: 12.2 % (ref 10–15)
GFR SERPL CREATININE-BSD FRML MDRD: 44 ML/MIN/1.73M2
GLUCOSE BLD-MCNC: 94 MG/DL (ref 70–99)
HCT VFR BLD AUTO: 31.4 % (ref 35–47)
HGB BLD-MCNC: 10.3 G/DL (ref 11.7–15.7)
INTERPRETATION ECG - MUSE: NORMAL
MCH RBC QN AUTO: 33.6 PG (ref 26.5–33)
MCHC RBC AUTO-ENTMCNC: 32.8 G/DL (ref 31.5–36.5)
MCV RBC AUTO: 102 FL (ref 78–100)
P AXIS - MUSE: -15 DEGREES
PLATELET # BLD AUTO: 223 10E3/UL (ref 150–450)
POTASSIUM BLD-SCNC: 4 MMOL/L (ref 3.4–5.3)
PR INTERVAL - MUSE: 150 MS
QRS DURATION - MUSE: 78 MS
QT - MUSE: 384 MS
QTC - MUSE: 462 MS
R AXIS - MUSE: 7 DEGREES
RBC # BLD AUTO: 3.07 10E6/UL (ref 3.8–5.2)
SODIUM SERPL-SCNC: 135 MMOL/L (ref 133–144)
SYSTOLIC BLOOD PRESSURE - MUSE: NORMAL MMHG
T AXIS - MUSE: 37 DEGREES
VENTRICULAR RATE- MUSE: 87 BPM
WBC # BLD AUTO: 9.8 10E3/UL (ref 4–11)

## 2021-10-17 PROCEDURE — 258N000003 HC RX IP 258 OP 636: Performed by: NURSE ANESTHETIST, CERTIFIED REGISTERED

## 2021-10-17 PROCEDURE — 710N000009 HC RECOVERY PHASE 1, LEVEL 1, PER MIN: Performed by: ORTHOPAEDIC SURGERY

## 2021-10-17 PROCEDURE — 250N000009 HC RX 250: Performed by: ANESTHESIOLOGY

## 2021-10-17 PROCEDURE — 360N000084 HC SURGERY LEVEL 4 W/ FLUORO, PER MIN: Performed by: ORTHOPAEDIC SURGERY

## 2021-10-17 PROCEDURE — 250N000013 HC RX MED GY IP 250 OP 250 PS 637: Performed by: PHYSICIAN ASSISTANT

## 2021-10-17 PROCEDURE — 0QS606Z REPOSITION RIGHT UPPER FEMUR WITH INTRAMEDULLARY INTERNAL FIXATION DEVICE, OPEN APPROACH: ICD-10-PCS | Performed by: ORTHOPAEDIC SURGERY

## 2021-10-17 PROCEDURE — 250N000013 HC RX MED GY IP 250 OP 250 PS 637: Performed by: HOSPITALIST

## 2021-10-17 PROCEDURE — C1769 GUIDE WIRE: HCPCS | Performed by: ORTHOPAEDIC SURGERY

## 2021-10-17 PROCEDURE — 370N000017 HC ANESTHESIA TECHNICAL FEE, PER MIN: Performed by: ORTHOPAEDIC SURGERY

## 2021-10-17 PROCEDURE — 258N000003 HC RX IP 258 OP 636: Performed by: ANESTHESIOLOGY

## 2021-10-17 PROCEDURE — 250N000013 HC RX MED GY IP 250 OP 250 PS 637: Performed by: INTERNAL MEDICINE

## 2021-10-17 PROCEDURE — 999N000141 HC STATISTIC PRE-PROCEDURE NURSING ASSESSMENT: Performed by: ORTHOPAEDIC SURGERY

## 2021-10-17 PROCEDURE — 250N000011 HC RX IP 250 OP 636: Performed by: ANESTHESIOLOGY

## 2021-10-17 PROCEDURE — 36415 COLL VENOUS BLD VENIPUNCTURE: CPT | Performed by: INTERNAL MEDICINE

## 2021-10-17 PROCEDURE — 250N000025 HC SEVOFLURANE, PER MIN: Performed by: ORTHOPAEDIC SURGERY

## 2021-10-17 PROCEDURE — 120N000001 HC R&B MED SURG/OB

## 2021-10-17 PROCEDURE — 999N000179 XR SURGERY CARM FLUORO LESS THAN 5 MIN W STILLS

## 2021-10-17 PROCEDURE — 85027 COMPLETE CBC AUTOMATED: CPT | Performed by: INTERNAL MEDICINE

## 2021-10-17 PROCEDURE — 93005 ELECTROCARDIOGRAM TRACING: CPT

## 2021-10-17 PROCEDURE — 250N000009 HC RX 250: Performed by: NURSE ANESTHETIST, CERTIFIED REGISTERED

## 2021-10-17 PROCEDURE — C1713 ANCHOR/SCREW BN/BN,TIS/BN: HCPCS | Performed by: ORTHOPAEDIC SURGERY

## 2021-10-17 PROCEDURE — 250N000011 HC RX IP 250 OP 636: Performed by: NURSE ANESTHETIST, CERTIFIED REGISTERED

## 2021-10-17 PROCEDURE — 250N000011 HC RX IP 250 OP 636: Performed by: INTERNAL MEDICINE

## 2021-10-17 PROCEDURE — 80048 BASIC METABOLIC PNL TOTAL CA: CPT | Performed by: INTERNAL MEDICINE

## 2021-10-17 PROCEDURE — 272N000001 HC OR GENERAL SUPPLY STERILE: Performed by: ORTHOPAEDIC SURGERY

## 2021-10-17 PROCEDURE — 99233 SBSQ HOSP IP/OBS HIGH 50: CPT | Performed by: HOSPITALIST

## 2021-10-17 PROCEDURE — 250N000011 HC RX IP 250 OP 636: Performed by: PHYSICIAN ASSISTANT

## 2021-10-17 DEVICE — IMP SCR SYN TFNA FENESTRATED LAG 100MM 04.038.200S: Type: IMPLANTABLE DEVICE | Site: HIP | Status: FUNCTIONAL

## 2021-10-17 DEVICE — IMP SCR SYN 5.0 TI LOCK T25 STARDRIVE 40MM 04.005.530S: Type: IMPLANTABLE DEVICE | Site: HIP | Status: FUNCTIONAL

## 2021-10-17 DEVICE — IMPLANTABLE DEVICE: Type: IMPLANTABLE DEVICE | Site: HIP | Status: FUNCTIONAL

## 2021-10-17 RX ORDER — HYDROMORPHONE HCL IN WATER/PF 6 MG/30 ML
0.2 PATIENT CONTROLLED ANALGESIA SYRINGE INTRAVENOUS
Status: DISCONTINUED | OUTPATIENT
Start: 2021-10-17 | End: 2021-10-20 | Stop reason: HOSPADM

## 2021-10-17 RX ORDER — NALOXONE HYDROCHLORIDE 0.4 MG/ML
0.4 INJECTION, SOLUTION INTRAMUSCULAR; INTRAVENOUS; SUBCUTANEOUS
Status: DISCONTINUED | OUTPATIENT
Start: 2021-10-17 | End: 2021-10-20 | Stop reason: HOSPADM

## 2021-10-17 RX ORDER — CEFAZOLIN SODIUM 1 G/3ML
1 INJECTION, POWDER, FOR SOLUTION INTRAMUSCULAR; INTRAVENOUS EVERY 8 HOURS
Status: COMPLETED | OUTPATIENT
Start: 2021-10-17 | End: 2021-10-18

## 2021-10-17 RX ORDER — DEXAMETHASONE SODIUM PHOSPHATE 4 MG/ML
INJECTION, SOLUTION INTRA-ARTICULAR; INTRALESIONAL; INTRAMUSCULAR; INTRAVENOUS; SOFT TISSUE PRN
Status: DISCONTINUED | OUTPATIENT
Start: 2021-10-17 | End: 2021-10-17

## 2021-10-17 RX ORDER — LABETALOL HYDROCHLORIDE 5 MG/ML
10 INJECTION, SOLUTION INTRAVENOUS
Status: DISCONTINUED | OUTPATIENT
Start: 2021-10-17 | End: 2021-10-17 | Stop reason: HOSPADM

## 2021-10-17 RX ORDER — TRAMADOL HYDROCHLORIDE 50 MG/1
50 TABLET ORAL EVERY 6 HOURS PRN
Status: DISCONTINUED | OUTPATIENT
Start: 2021-10-17 | End: 2021-10-20 | Stop reason: HOSPADM

## 2021-10-17 RX ORDER — ACETAMINOPHEN 325 MG/1
975 TABLET ORAL EVERY 8 HOURS
Status: DISCONTINUED | OUTPATIENT
Start: 2021-10-17 | End: 2021-10-20 | Stop reason: HOSPADM

## 2021-10-17 RX ORDER — HYDROMORPHONE HCL IN WATER/PF 6 MG/30 ML
0.2 PATIENT CONTROLLED ANALGESIA SYRINGE INTRAVENOUS EVERY 5 MIN PRN
Status: DISCONTINUED | OUTPATIENT
Start: 2021-10-17 | End: 2021-10-17 | Stop reason: HOSPADM

## 2021-10-17 RX ORDER — ONDANSETRON 2 MG/ML
INJECTION INTRAMUSCULAR; INTRAVENOUS PRN
Status: DISCONTINUED | OUTPATIENT
Start: 2021-10-17 | End: 2021-10-17

## 2021-10-17 RX ORDER — PHENYLEPHRINE HCL IN 0.9% NACL 50MG/250ML
.2-1.25 PLASTIC BAG, INJECTION (ML) INTRAVENOUS CONTINUOUS
Status: DISCONTINUED | OUTPATIENT
Start: 2021-10-17 | End: 2021-10-17 | Stop reason: HOSPADM

## 2021-10-17 RX ORDER — ONDANSETRON 4 MG/1
4 TABLET, ORALLY DISINTEGRATING ORAL EVERY 30 MIN PRN
Status: DISCONTINUED | OUTPATIENT
Start: 2021-10-17 | End: 2021-10-17 | Stop reason: HOSPADM

## 2021-10-17 RX ORDER — SODIUM CHLORIDE, SODIUM LACTATE, POTASSIUM CHLORIDE, CALCIUM CHLORIDE 600; 310; 30; 20 MG/100ML; MG/100ML; MG/100ML; MG/100ML
INJECTION, SOLUTION INTRAVENOUS CONTINUOUS
Status: DISCONTINUED | OUTPATIENT
Start: 2021-10-17 | End: 2021-10-17 | Stop reason: HOSPADM

## 2021-10-17 RX ORDER — ONDANSETRON 2 MG/ML
4 INJECTION INTRAMUSCULAR; INTRAVENOUS EVERY 30 MIN PRN
Status: DISCONTINUED | OUTPATIENT
Start: 2021-10-17 | End: 2021-10-17 | Stop reason: HOSPADM

## 2021-10-17 RX ORDER — FENTANYL CITRATE 50 UG/ML
INJECTION, SOLUTION INTRAMUSCULAR; INTRAVENOUS PRN
Status: DISCONTINUED | OUTPATIENT
Start: 2021-10-17 | End: 2021-10-17

## 2021-10-17 RX ORDER — AMOXICILLIN 250 MG
1 CAPSULE ORAL 2 TIMES DAILY
Status: DISCONTINUED | OUTPATIENT
Start: 2021-10-17 | End: 2021-10-20 | Stop reason: HOSPADM

## 2021-10-17 RX ORDER — SODIUM CHLORIDE, SODIUM LACTATE, POTASSIUM CHLORIDE, CALCIUM CHLORIDE 600; 310; 30; 20 MG/100ML; MG/100ML; MG/100ML; MG/100ML
INJECTION, SOLUTION INTRAVENOUS CONTINUOUS
Status: DISCONTINUED | OUTPATIENT
Start: 2021-10-17 | End: 2021-10-19

## 2021-10-17 RX ORDER — ONDANSETRON 2 MG/ML
4 INJECTION INTRAMUSCULAR; INTRAVENOUS EVERY 6 HOURS PRN
Status: DISCONTINUED | OUTPATIENT
Start: 2021-10-17 | End: 2021-10-20 | Stop reason: HOSPADM

## 2021-10-17 RX ORDER — POLYETHYLENE GLYCOL 3350 17 G/17G
17 POWDER, FOR SOLUTION ORAL DAILY
Status: DISCONTINUED | OUTPATIENT
Start: 2021-10-18 | End: 2021-10-20 | Stop reason: HOSPADM

## 2021-10-17 RX ORDER — NALOXONE HYDROCHLORIDE 0.4 MG/ML
0.2 INJECTION, SOLUTION INTRAMUSCULAR; INTRAVENOUS; SUBCUTANEOUS
Status: DISCONTINUED | OUTPATIENT
Start: 2021-10-17 | End: 2021-10-20 | Stop reason: HOSPADM

## 2021-10-17 RX ORDER — PROPOFOL 10 MG/ML
INJECTION, EMULSION INTRAVENOUS CONTINUOUS PRN
Status: DISCONTINUED | OUTPATIENT
Start: 2021-10-17 | End: 2021-10-17

## 2021-10-17 RX ORDER — LEVOTHYROXINE SODIUM 88 UG/1
88 TABLET ORAL DAILY
Status: DISCONTINUED | OUTPATIENT
Start: 2021-10-17 | End: 2021-10-20 | Stop reason: HOSPADM

## 2021-10-17 RX ORDER — HYDROMORPHONE HCL IN WATER/PF 6 MG/30 ML
0.4 PATIENT CONTROLLED ANALGESIA SYRINGE INTRAVENOUS
Status: DISCONTINUED | OUTPATIENT
Start: 2021-10-17 | End: 2021-10-20 | Stop reason: HOSPADM

## 2021-10-17 RX ORDER — PROPOFOL 10 MG/ML
INJECTION, EMULSION INTRAVENOUS PRN
Status: DISCONTINUED | OUTPATIENT
Start: 2021-10-17 | End: 2021-10-17

## 2021-10-17 RX ORDER — BUPIVACAINE HYDROCHLORIDE 7.5 MG/ML
INJECTION, SOLUTION INTRASPINAL
Status: DISCONTINUED | OUTPATIENT
Start: 2021-10-17 | End: 2021-10-17

## 2021-10-17 RX ORDER — OXYCODONE HYDROCHLORIDE 5 MG/1
5 TABLET ORAL EVERY 4 HOURS PRN
Status: DISCONTINUED | OUTPATIENT
Start: 2021-10-17 | End: 2021-10-17 | Stop reason: HOSPADM

## 2021-10-17 RX ORDER — BISACODYL 10 MG
10 SUPPOSITORY, RECTAL RECTAL DAILY PRN
Status: DISCONTINUED | OUTPATIENT
Start: 2021-10-17 | End: 2021-10-20 | Stop reason: HOSPADM

## 2021-10-17 RX ORDER — SIMVASTATIN 40 MG
40 TABLET ORAL AT BEDTIME
Status: DISCONTINUED | OUTPATIENT
Start: 2021-10-17 | End: 2021-10-20 | Stop reason: HOSPADM

## 2021-10-17 RX ORDER — SERTRALINE HCL 25 MG
12.5 TABLET ORAL DAILY
Status: DISCONTINUED | OUTPATIENT
Start: 2021-10-17 | End: 2021-10-20 | Stop reason: HOSPADM

## 2021-10-17 RX ORDER — SODIUM CHLORIDE, SODIUM LACTATE, POTASSIUM CHLORIDE, CALCIUM CHLORIDE 600; 310; 30; 20 MG/100ML; MG/100ML; MG/100ML; MG/100ML
INJECTION, SOLUTION INTRAVENOUS CONTINUOUS PRN
Status: DISCONTINUED | OUTPATIENT
Start: 2021-10-17 | End: 2021-10-17

## 2021-10-17 RX ORDER — FENTANYL CITRATE 50 UG/ML
25 INJECTION, SOLUTION INTRAMUSCULAR; INTRAVENOUS EVERY 5 MIN PRN
Status: DISCONTINUED | OUTPATIENT
Start: 2021-10-17 | End: 2021-10-17 | Stop reason: HOSPADM

## 2021-10-17 RX ORDER — CEFAZOLIN SODIUM 1 G/3ML
INJECTION, POWDER, FOR SOLUTION INTRAMUSCULAR; INTRAVENOUS PRN
Status: DISCONTINUED | OUTPATIENT
Start: 2021-10-17 | End: 2021-10-17

## 2021-10-17 RX ORDER — PROCHLORPERAZINE MALEATE 5 MG
5 TABLET ORAL EVERY 6 HOURS PRN
Status: DISCONTINUED | OUTPATIENT
Start: 2021-10-17 | End: 2021-10-20 | Stop reason: HOSPADM

## 2021-10-17 RX ORDER — SODIUM CHLORIDE, SODIUM LACTATE, POTASSIUM CHLORIDE, CALCIUM CHLORIDE 600; 310; 30; 20 MG/100ML; MG/100ML; MG/100ML; MG/100ML
INJECTION, SOLUTION INTRAVENOUS CONTINUOUS
Status: CANCELLED | OUTPATIENT
Start: 2021-10-17

## 2021-10-17 RX ORDER — ONDANSETRON 4 MG/1
4 TABLET, ORALLY DISINTEGRATING ORAL EVERY 6 HOURS PRN
Status: DISCONTINUED | OUTPATIENT
Start: 2021-10-17 | End: 2021-10-20 | Stop reason: HOSPADM

## 2021-10-17 RX ORDER — LIDOCAINE 40 MG/G
CREAM TOPICAL
Status: DISCONTINUED | OUTPATIENT
Start: 2021-10-17 | End: 2021-10-20 | Stop reason: HOSPADM

## 2021-10-17 RX ORDER — ACETAMINOPHEN 325 MG/1
650 TABLET ORAL EVERY 4 HOURS PRN
Status: DISCONTINUED | OUTPATIENT
Start: 2021-10-20 | End: 2021-10-20 | Stop reason: HOSPADM

## 2021-10-17 RX ADMIN — PHENYLEPHRINE HYDROCHLORIDE 100 MCG: 10 INJECTION INTRAVENOUS at 12:58

## 2021-10-17 RX ADMIN — PHENYLEPHRINE HYDROCHLORIDE 100 MCG: 10 INJECTION INTRAVENOUS at 12:53

## 2021-10-17 RX ADMIN — PROPOFOL 40 MCG/KG/MIN: 10 INJECTION, EMULSION INTRAVENOUS at 12:11

## 2021-10-17 RX ADMIN — SERTRALINE HYDROCHLORIDE 12.5 MG: 25 TABLET ORAL at 17:03

## 2021-10-17 RX ADMIN — LEVOTHYROXINE SODIUM 88 MCG: 88 TABLET ORAL at 17:03

## 2021-10-17 RX ADMIN — PHENYLEPHRINE HYDROCHLORIDE 0.5 MCG/KG/MIN: 10 INJECTION INTRAVENOUS at 12:11

## 2021-10-17 RX ADMIN — PHENYLEPHRINE HYDROCHLORIDE 200 MCG: 10 INJECTION INTRAVENOUS at 12:28

## 2021-10-17 RX ADMIN — ACETAMINOPHEN 1000 MG: 500 TABLET, FILM COATED ORAL at 08:44

## 2021-10-17 RX ADMIN — PROPOFOL 20 MG: 10 INJECTION, EMULSION INTRAVENOUS at 12:36

## 2021-10-17 RX ADMIN — PHENYLEPHRINE HYDROCHLORIDE 100 MCG: 10 INJECTION INTRAVENOUS at 12:29

## 2021-10-17 RX ADMIN — CEFAZOLIN 1 G: 1 INJECTION, POWDER, FOR SOLUTION INTRAMUSCULAR; INTRAVENOUS at 21:07

## 2021-10-17 RX ADMIN — Medication 30 ML: at 11:40

## 2021-10-17 RX ADMIN — SENNOSIDES AND DOCUSATE SODIUM 1 TABLET: 8.6; 5 TABLET ORAL at 21:07

## 2021-10-17 RX ADMIN — Medication 0.5 MCG/KG/MIN: at 13:30

## 2021-10-17 RX ADMIN — PHENYLEPHRINE HYDROCHLORIDE 100 MCG: 10 INJECTION INTRAVENOUS at 12:11

## 2021-10-17 RX ADMIN — Medication 0.15 MCG/KG/MIN: at 13:48

## 2021-10-17 RX ADMIN — METOPROLOL SUCCINATE 25 MG: 25 TABLET, EXTENDED RELEASE ORAL at 08:44

## 2021-10-17 RX ADMIN — ONDANSETRON 4 MG: 2 INJECTION INTRAMUSCULAR; INTRAVENOUS at 12:51

## 2021-10-17 RX ADMIN — CEFAZOLIN 2 G: 1 INJECTION, POWDER, FOR SOLUTION INTRAMUSCULAR; INTRAVENOUS at 12:30

## 2021-10-17 RX ADMIN — SODIUM CHLORIDE, POTASSIUM CHLORIDE, SODIUM LACTATE AND CALCIUM CHLORIDE: 600; 310; 30; 20 INJECTION, SOLUTION INTRAVENOUS at 12:02

## 2021-10-17 RX ADMIN — PROPOFOL 30 MG: 10 INJECTION, EMULSION INTRAVENOUS at 12:05

## 2021-10-17 RX ADMIN — BUPIVACAINE HYDROCHLORIDE IN DEXTROSE 1.2 ML: 7.5 INJECTION, SOLUTION SUBARACHNOID at 12:15

## 2021-10-17 RX ADMIN — PROPOFOL 10 MG: 10 INJECTION, EMULSION INTRAVENOUS at 12:08

## 2021-10-17 RX ADMIN — FENTANYL CITRATE 25 MCG: 50 INJECTION, SOLUTION INTRAMUSCULAR; INTRAVENOUS at 12:02

## 2021-10-17 RX ADMIN — ONDANSETRON 4 MG: 4 TABLET, ORALLY DISINTEGRATING ORAL at 09:11

## 2021-10-17 RX ADMIN — DEXAMETHASONE SODIUM PHOSPHATE 4 MG: 4 INJECTION, SOLUTION INTRA-ARTICULAR; INTRALESIONAL; INTRAMUSCULAR; INTRAVENOUS; SOFT TISSUE at 12:51

## 2021-10-17 RX ADMIN — SODIUM CHLORIDE, POTASSIUM CHLORIDE, SODIUM LACTATE AND CALCIUM CHLORIDE: 600; 310; 30; 20 INJECTION, SOLUTION INTRAVENOUS at 13:25

## 2021-10-17 RX ADMIN — FENTANYL CITRATE 25 MCG: 50 INJECTION, SOLUTION INTRAMUSCULAR; INTRAVENOUS at 12:36

## 2021-10-17 RX ADMIN — SIMVASTATIN 40 MG: 40 TABLET, FILM COATED ORAL at 21:07

## 2021-10-17 NOTE — BRIEF OP NOTE
[facility]    Brief Operative Note    Pre-operative diagnosis: Hip fracture, right (H) [S72.001A]  Post-operative diagnosis Same as pre-operative diagnosis    Procedure: Procedure(s):  INTERNAL FIXATION, FRACTURE, TROCHANTERIC, HIP, NAILING  Surgeon: Surgeon(s) and Role:     * Benjy Tirado MD - Primary     * Mary Woodward PA-C - Assisting  Anesthesia: General   Estimated Blood Loss: None    Drains: None  Specimens: * No specimens in log *  Findings:   None.  Complications: None.  Implants:   Implant Name Type Inv. Item Serial No.  Lot No. LRB No. Used Action   IMP NAIL SYN CAN FEM PROX TFNA [id number] 130D RT 04.037.162S - [id number] [employer]ware/[employer] IMP NAIL SYN CAN FEM PROX TFNA [id number] 130D RT 04.037.162S  qualifyor [id number] Right 1 Implanted   IMP SCR SYN TFNA FENESTRATED LAG 100MM 04.038.200S - [id number] Met[employer] Hardware/[place] IMP SCR SYN TFNA FENESTRATED LAG 100MM 04.038.200S  qualifyor [id number] Right 1 Implanted   IMP SCR SYN 5.0 TI LOCK T25 STARDRIVE 40MM 04.005.530S - [id number] Metallic Hardware/[employer] IMP SCR SYN 5.0 TI LOCK T25 STARDRIVE 40MM 04.005.530S  qualifyor [id number] Right 1 Implanted     Plan:  WBAT  DVT prophylaxis - SCDs & Lovenox, then discharge on ASA EC 81 mg PO BID x 4 weeks   Ancef x 24 hours  PACU XRs  PT  Keep dressings C/D/I for 1 week; okay to shower    Follow up with Tomaas Woodward PA-C in clinic in 2 weeks.

## 2021-10-17 NOTE — ANESTHESIA PROCEDURE NOTES
Fascia iliaca Procedure Note    Pre-Procedure   Staff -        Anesthesiologist:  Shailesh Patel MD       Performed By: Anesthesiologist       Location: pre-op       Pre-Anesthestic Checklist: patient identified, IV checked, site marked, risks and benefits discussed, informed consent, monitors and equipment checked, at physician/surgeon's request and post-op pain management  Timeout:       Correct Patient: Yes        Correct Procedure: Yes        Correct Site: Yes        Correct Position: Yes        Correct Laterality: Yes        Site Marked: Yes  Procedure Documentation  Procedure: Fascia iliaca       Laterality: right       Patient Position: supine       Patient Prep/Sterile Barriers: sterile gloves, mask       Skin prep: Chloraprep      Local skin infiltrated with mL of 1% lidocaine.        Needle Type: insulated and short bevel (Arrow)       Needle Gauge: 21.        Needle Length (millimeters): 90        Ultrasound guided       1. Ultrasound was used to identify targeted nerve, plexus, vascular marker, or fascial plane and place a needle adjacent to it in real-time.       2. Ultrasound was used to visualize the spread of anesthetic in close proximity to the above referenced structure.       3. A permanent image is entered into the patient's record.       4. The visualized anatomic structures appeared normal.       5. There were no apparent abnormal pathologic findings.    Assessment/Narrative         The placement was negative for: blood aspirated, painful injection and site bleeding       Paresthesias: No.     Bolus given via needle..        Secured via.        Insertion/Infusion Method: Single Shot       Complications: none    Medication(s) Administered   Bupivacaine 0.25% w/ 1:400K Epi (Injection), 30 mL  Medication Administration Time: 10/17/2021 11:40 AM     Comments:  Peripheral nerve block performed at request of the primary medical team.      Postoperative pain block requested by surgeon for severe  postoperative pain.  Patient brought to Preop for procedure.      Pt tolerated well.    No complications.      The surgeon has given a verbal order transferring care of this patient to me for the performance of a regional analgesia block for post-op pain control. It is requested of me because I am uniquely trained and qualified to perform this block and the surgeon is neither trained nor qualified to perform this procedure.    CHRISTINE CORTÉS MD   October 17, 2021 11:44 AM

## 2021-10-17 NOTE — PLAN OF CARE
Alert & Orineted x4. Repositioned in bed. Denies pain at rest. Calls for help. Melatonin given per request.  Incontinent of bladder, purewick in place. Reinstructed - NPO post  midnight for surgery.  PACU instruction will be hand in to the incoming nurse.Will continue to monitor.

## 2021-10-17 NOTE — OP NOTE
Procedure Date: 10/17/2021    PREOPERATIVE DIAGNOSIS:  Right hip intertrochanteric fracture.    POSTOPERATIVE DIAGNOSIS:  Right hip intertrochanteric fracture.    PROCEDURE:  Right hip cephalomedullary nail.    SURGEON:  Benjy Tirado MD    FIRST ASSISTANT:  NESTOR Rosas    A skilled first assistant was necessary for patient positioning, prepping and draping, help with soft tissue retraction, help with leg positioning, help with reduction maneuvers, closing and patient transfer.    ANESTHESIA:  Spinal and block.    COMPLICATIONS:  None apparent.    ESTIMATED BLOOD LOSS:  40 mL.    IMPLANTS:  Long Synthes TFN nail, size 11 x 420 x 130.    INDICATIONS:  The patient is a 89-year-old female who sustained a mechanical fall and fell onto her right hip.  She was brought to the ER and was found to have a displaced intertrochanteric fracture.  She was admitted and Orthopedics was consulted and after discussion of treatment options with the patient and her family, we decided the best way to move forward would be a cephalomedullary nail.  They agreed to proceed.    DESCRIPTION OF PROCEDURE:  On the day of the procedure, the patient was met in the preoperative area by the Surgery and Anesthesia team.  The right hip was marked by the operative surgeon.  Informed consent was obtained.  Risks and benefits of the surgery were discussed with the patient including risk of bleeding, infection, damage to neurovascular structures, stiffness, continued pain, screw cutout, nonunion, malunion, and risk of anesthesia.  They agreed to proceed.    Our Anesthesia colleagues then performed an fascia iliacus block.  She was then brought to the operating room and spinal anesthetic was administered.  She was placed on the [place] table in supine position, and traction and internal rotation were utilized in order to reduce the fracture.  When we were satisfied with the fracture reduction, the right hip was prepped and draped in the usual  sterile fashion.  Preoperative antibiotics were given.  A preoperative timeout was performed.  We began the procedure by making a standard incision just proximal to the greater trochanter.  Sharp dissection was carried down through the skin and subcutaneous tissue.  The fascia was split and the tip of the greater trochanter was palpated and the pin was placed at the appropriate start point on the greater trochanter and double checked under AP and lateral views.  It was then inserted into the proximal femur and over-reamed using an opening reamer.  A long guidewire was then placed all the way down to the knee and double checked for length.  We noted that a size 420 nail would be appropriate.  We then reamed up to a size 12.5 and placed an 11 x 420 nail into the femur on a jig.  We then used the triple trocar in order to place our cephalomedullary screw into the center-center of the femoral head.  We placed the pin, overdrilled, and placed the appropriate-sized screw.  We compressed through the construct and locked the construct proximally.  The jig was then removed.  We then moved our attention to the knee and using the perfect Venetie technique, placed a distal interlocking screw through the distal nail.  Final pictures were taken that demonstrated appropriate reduction of the fracture and placement of the nail.  The wound was then copiously irrigated.  Fascia was closed with 0 Vicryl followed by 2-0 Vicryl for deep dermals and staples for the skin.  Sterile dressings were applied.  The patient was then awakened from anesthesia and brought to the PACU for recovery.      Postoperatively, she will be weightbearing as tolerated and initiate physical therapy immediately.    Benjy Tirado MD        D: 10/17/2021   T: 10/17/2021   MT: [facility]    Name:     PADMAJA POST  MRN:      [id number]        Account:        [id number]   :      1929           Procedure Date: 10/17/2021     Document: [id number]

## 2021-10-17 NOTE — ANESTHESIA PREPROCEDURE EVALUATION
Anesthesia Pre-Procedure Evaluation    Patient: Katrina Portillo   MRN: [id number] : 1929        Preoperative Diagnosis: Hip fracture, right (H) [S72.001A]    Procedure : Procedure(s):  INTERNAL FIXATION, FRACTURE, TROCHANTERIC, HIP, USING PINS OR RODS          No past medical history on file.   No past surgical history on file.   Allergies   Allergen Reactions     Procaine      Other reaction(s): Tachycardia     Tetanus Toxoids      Latex      Possible allergy historically per patient     Tetanus Antitoxin      Other reaction(s): Edema  Arm doubled in size      Social History     Tobacco Use     Smoking status: Not on file   Substance Use Topics     Alcohol use: Not on file      Wt Readings from Last 1 Encounters:   10/16/21 63.5 kg (140 lb)        Anesthesia Evaluation            ROS/MED HX  ENT/Pulmonary:     (+) sleep apnea,     Neurologic:       Cardiovascular:     (+) hypertension-----    METS/Exercise Tolerance:     Hematologic:       Musculoskeletal:   (+) fracture, Fracture location: RLE,     GI/Hepatic:    (-) GERD   Renal/Genitourinary:       Endo:     (+) thyroid problem, hypothyroidism,     Psychiatric/Substance Use:     (+) psychiatric history depression     Infectious Disease:       Malignancy:       Other:            Physical Exam    Airway        Mallampati: II   TM distance: > 3 FB   Neck ROM: full   Mouth opening: > 3 cm    Respiratory Devices and Support         Dental  no notable dental history         Cardiovascular   cardiovascular exam normal          Pulmonary   pulmonary exam normal                OUTSIDE LABS:  CBC:   Lab Results   Component Value Date    WBC 9.8 10/17/2021    WBC 8.6 10/16/2021    HGB 10.3 (L) 10/17/2021    HGB 11.5 (L) 10/16/2021    HCT 31.4 (L) 10/17/2021    HCT 34.6 (L) 10/16/2021     10/17/2021     10/16/2021     BMP:   Lab Results   Component Value Date     10/17/2021     10/16/2021    POTASSIUM 4.0 10/17/2021    POTASSIUM 3.9  10/16/2021    CHLORIDE 104 10/17/2021    CHLORIDE 104 10/16/2021    CO2 26 10/17/2021    CO2 26 10/16/2021    BUN 25 10/17/2021    BUN 29 10/16/2021    CR 1.09 (H) 10/17/2021    CR 1.15 (H) 10/16/2021    GLC 94 10/17/2021     (H) 10/16/2021     COAGS: No results found for: PTT, INR, FIBR  POC: No results found for: BGM, HCG, HCGS  HEPATIC: No results found for: ALBUMIN, PROTTOTAL, ALT, AST, GGT, ALKPHOS, BILITOTAL, BILIDIRECT, ABDULKADIR  OTHER:   Lab Results   Component Value Date    BOGDAN 8.5 10/17/2021       Anesthesia Plan    ASA Status:  2   NPO Status:  NPO Appropriate    Anesthesia Type: Spinal.              Consents    Anesthesia Plan(s) and associated risks, benefits, and realistic alternatives discussed. Questions answered and patient/representative(s) expressed understanding.     - Discussed with:  Patient         Postoperative Care    Pain management: IV analgesics, Peripheral nerve block (Single Shot).   PONV prophylaxis: Ondansetron (or other 5HT-3)     Comments:                CHRISTINE CORTÉS MD

## 2021-10-17 NOTE — OR NURSING
SBP 56/35 - colton started. 1000 ml of fluid being given. SBP at 94/48. Continuing to monitor closely

## 2021-10-17 NOTE — ANESTHESIA POSTPROCEDURE EVALUATION
Patient: Katrina Portillo    Procedure: Procedure(s):  INTERNAL FIXATION, FRACTURE, TROCHANTERIC, HIP, NAILING       Diagnosis:Hip fracture, right (H) [S72.001A]  Diagnosis Additional Information: No value filed.    Anesthesia Type:  Spinal    Note:  Disposition: Inpatient   Postop Pain Control: Uneventful            Sign Out: Well controlled pain   PONV: No   Neuro/Psych: Uneventful            Sign Out: Acceptable/Baseline neuro status   Airway/Respiratory: Uneventful            Sign Out: Acceptable/Baseline resp. status   CV/Hemodynamics: Uneventful            Sign Out: Acceptable CV status; No obvious hypovolemia; No obvious fluid overload   Other NRE: NONE   DID A NON-ROUTINE EVENT OCCUR? No    Event details/Postop Comments:  Some chest heaviness in the PACU; initially required some phenylephrine to maintain BP, but quickly weaned off; EKG normal; likely due to SAB; symptoms improved as spinal block wore off; okay to go to floor;           Last vitals:  Vitals Value Taken Time   BP 85/46 10/17/21 1500   Temp 36.8  C (98.2  F) 10/17/21 1450   Pulse 73 10/17/21 1501   Resp 12 10/17/21 1501   SpO2 100 % 10/17/21 1501   Vitals shown include unvalidated device data.    Electronically Signed By: CHRISTINE CORTÉS MD  October 17, 2021  3:02 PM

## 2021-10-17 NOTE — PROGRESS NOTES
S:  No acute events overnight. Pain well controlled. Doing well. Plan for surgery today    O:  /76 (BP Location: Left arm)   Pulse 91   Temp 98.2  F (36.8  C) (Oral)   Resp 18   Wt 63.5 kg (140 lb)   SpO2 97%       RLE:  Shortened and externally rotated.  +DF/PF  NVI distally    A/P:  89 year old female with R hip intertroch fx    - plan for hip nail today    Benjy Tirado MD

## 2021-10-17 NOTE — PROGRESS NOTES
[facility]    Hospitalist Progress Note      Assessment & Plan   Katrina Portillo is a 89 year old female who presented to the [facility] at the [facility] ER with right hip pain after a slip/fall.    Right hip fracture with mechanical slip/fall:   -R hip nailing per Ortho 10/17/2021, now in PACU.  No reported complications.  -Postop pain management, weightbearing status, DVT PxP per Ortho.  -PT/OT     HTN:  [PTA metoprolol and losartan]  -Metoprolol restarted on admission, losartan held.  -Now in PACU, soft BP diastolic 100-90.  Continue to hold losartan, hold parameters for metoprolol.  -Postop per Ortho IVF  cc/hour.  -BMP in AM.     Hypothyroidism:  -  Levothyroxine can be resumed post-op     Depression:  -PTA sertraline, restart postop.     History of murmur:  -  Hasn't wanted further eval on this.  Prior had an echo several years ago without major valve disease noted (was mild).  Also had negative stress test with normal heart function in 2017.  No recent cardiac complaints or worsening SOB.     MONCHO:  -  History of some sleep apnea.  Not tolerant of CPAP.  Improved/controls with sleeping on side.     Mildly Abnormal pre-op CXR:  -  Appears more fibrosis/old scarring.  No acute respiratory complaints.  O2 sats good on room air.     COVID screen:  Negative    DVT Prophylaxis: Pneumatic Compression Devices, further DVT PxP per Ortho  Code Status: Full Code  Expected discharge: 1-2 days pending surgical course, Ortho clearance and plan established.  Needs Therapies assessment regarding discharge planning.    Total unit/floor time 35 minutes:  time consisted of the following assuming Patient care, examination of patient, review of records including labs, imaging results, medications, interdisciplinary notes and completing documentation; > 50% Counseling/discussion with Patient regarding current condition including fracture, Ortho plans, pain mgmt and Coordination of Care time with Nursing  and Specialists,  Ortho regarding hip frx care plan, management and surveillance.     Leona Ashraf MD  Text Page (7am - 6pm, M-F)    Interval History   Right hip fracture secondary to fall.  No pain except with movement [in AM prior to surgery].  Plan Ortho procedure later today.  Subsequent: Ortho note indicates right hip nailing, no reported complications. In PACU.     SH: No tobacco.  [place]ives in [place]      ROS: Complete ROS negative except as above.     -Data reviewed today: I reviewed all new labs and imaging results over the last 24 hours.     Physical Exam   Temp: 98.1  F (36.7  C) Temp src: Temporal BP: 93/57 Pulse: 74   Resp: 15 SpO2: 100 % O2 Device: Nasal cannula Oxygen Delivery: 2 LPM  Vitals:    10/16/21 0842   Weight: 63.5 kg (140 lb)     Vital Signs with Ranges  Temp:  [97.7  F (36.5  C)-98.7  F (37.1  C)] 98.1  F (36.7  C)  Pulse:  [71-91] 74  Resp:  [9-28] 15  BP: ()/(32-82) 93/57  SpO2:  [72 %-100 %] 100 %  I/O last 3 completed shifts:  In: 120 [P.O.:120]  Out: 400 [Urine:400]    General/Constitutional:  NAD, alert, calm, cooperative    HEENT/Head Exam:  atraumatic  Eyes:  PERRL, no conjunctivits  Mouth/Oral Pharynx:  Buccal mucosa WNL  Chest/Respiratory:  Air exchange bilateral lung fields; no rales or wheeze. Respiration nonlabored.  Cardiovascular:  1/2 systolic murmur appreciated.  LE edema trace  Gastrointestinal/Abdomen:  soft, nontender,  no rebound, guarding or other peritoneal signs.  Musculoskeletal:  extremities warm, dry, noncyanotic;  R leg abducted 2' pain; pain with movement  [ in AM before surgery]  Neuro.  Gross motor tested, nonfocal, sensory intact  Psych oriented, affect calm   Skin:  No rash noted on gross exam    Medications     lactated ringers 999 mL/hr at 10/17/21 1325     phenylephrine Stopped (10/17/21 1405)     sodium chloride         [Auto Hold] acetaminophen  1,000 mg Oral TID     [Auto Hold] metoprolol succinate ER  25 mg Oral Daily     [Auto Hold] sodium chloride (PF)  3 mL  Intracatheter Q8H       Data   Recent Labs   Lab 10/17/21  0747 10/16/21  0856   WBC 9.8 8.6   HGB 10.3* 11.5*   * 101*    242    135   POTASSIUM 4.0 3.9   CHLORIDE 104 104   CO2 26 26   BUN 25 29   CR 1.09* 1.15*   ANIONGAP 5 5   BOGDAN 8.5 8.6   GLC 94 144*

## 2021-10-17 NOTE — ANESTHESIA PROCEDURE NOTES
Intrathecal Procedure Note    Pre-Procedure   Staff -        Anesthesiologist:  Shailesh Patel MD       Performed By: anesthesiologist       Location: OR       Pre-Anesthestic Checklist: patient identified, IV checked, risks and benefits discussed, informed consent, monitors and equipment checked and pre-op evaluation  Timeout:       Correct Patient: Yes        Correct Procedure: Yes        Correct Site: Yes        Correct Position: Yes   Procedure Documentation  Procedure: intrathecal       Patient Position: sitting       Skin prep: Betadine       Insertion Site: L4-5. (midline approach).       Needle Gauge: 22.        Needle Length (Inches): 3.5        Spinal Needle Type: Pencan       Introducer used       Introducer: 20 G      # of attempts: 1 and  # of redirects:     Assessment/Narrative         Paresthesias: No.       CSF fluid: clear.    Medication(s) Administered   0.75% Hyperbaric Bupivacaine (Intrathecal), 1.2 mL  Medication Administration Time: 10/17/2021 12:15 PM     Comments:  1% lidocaine for localization.  No complications.

## 2021-10-17 NOTE — ANESTHESIA CARE TRANSFER NOTE
Patient: Katrina Portillo    Procedure: Procedure(s):  INTERNAL FIXATION, FRACTURE, TROCHANTERIC, HIP, NAILING       Diagnosis: Hip fracture, right (H) [S72.001A]  Diagnosis Additional Information: No value filed.    Anesthesia Type:   Spinal     Note:    Oropharynx: oropharynx clear of all foreign objects  Level of Consciousness: awake  Oxygen Supplementation: room air    Independent Airway: airway patency satisfactory and stable  Dentition: dentition changed  Vital Signs Stable: post-procedure vital signs reviewed and stable  Report to RN Given: handoff report given  Patient transferred to: PACU    Handoff Report: Identifed the Patient, Identified the Reponsible Provider, Reviewed the pertinent medical history, Discussed the surgical course, Reviewed Intra-OP anesthesia mangement and issues during anesthesia, Set expectations for post-procedure period and Allowed opportunity for questions and acknowledgement of understanding      Vitals:  Vitals Value Taken Time   /63 10/17/21 1340   Temp 36.5  C (97.7  F) 10/17/21 1325   Pulse 74 10/17/21 1346   Resp 26 10/17/21 1346   SpO2 97 % 10/17/21 1346   Vitals shown include unvalidated device data.    Electronically Signed By: JANET Lal CRNA  October 17, 2021  1:47 PM

## 2021-10-18 ENCOUNTER — APPOINTMENT (OUTPATIENT)
Dept: PHYSICAL THERAPY | Facility: CLINIC | Age: 86
DRG: 481 | End: 2021-10-18
Payer: COMMERCIAL

## 2021-10-18 LAB
ANION GAP SERPL CALCULATED.3IONS-SCNC: 8 MMOL/L (ref 3–14)
BUN SERPL-MCNC: 23 MG/DL (ref 7–30)
CALCIUM SERPL-MCNC: 8.1 MG/DL (ref 8.5–10.1)
CHLORIDE BLD-SCNC: 105 MMOL/L (ref 94–109)
CO2 SERPL-SCNC: 25 MMOL/L (ref 20–32)
CREAT SERPL-MCNC: 1.11 MG/DL (ref 0.52–1.04)
GFR SERPL CREATININE-BSD FRML MDRD: 43 ML/MIN/1.73M2
GLUCOSE BLD-MCNC: 108 MG/DL (ref 70–99)
HGB BLD-MCNC: 9 G/DL (ref 11.7–15.7)
POTASSIUM BLD-SCNC: 3.9 MMOL/L (ref 3.4–5.3)
SODIUM SERPL-SCNC: 138 MMOL/L (ref 133–144)

## 2021-10-18 PROCEDURE — 120N000001 HC R&B MED SURG/OB

## 2021-10-18 PROCEDURE — 250N000013 HC RX MED GY IP 250 OP 250 PS 637: Performed by: HOSPITALIST

## 2021-10-18 PROCEDURE — 250N000011 HC RX IP 250 OP 636: Performed by: PHYSICIAN ASSISTANT

## 2021-10-18 PROCEDURE — 97110 THERAPEUTIC EXERCISES: CPT | Mod: GP | Performed by: PHYSICAL THERAPIST

## 2021-10-18 PROCEDURE — 97530 THERAPEUTIC ACTIVITIES: CPT | Mod: GP | Performed by: PHYSICAL THERAPIST

## 2021-10-18 PROCEDURE — 36415 COLL VENOUS BLD VENIPUNCTURE: CPT | Performed by: HOSPITALIST

## 2021-10-18 PROCEDURE — 99232 SBSQ HOSP IP/OBS MODERATE 35: CPT | Performed by: HOSPITALIST

## 2021-10-18 PROCEDURE — 250N000013 HC RX MED GY IP 250 OP 250 PS 637: Performed by: INTERNAL MEDICINE

## 2021-10-18 PROCEDURE — 85018 HEMOGLOBIN: CPT | Performed by: HOSPITALIST

## 2021-10-18 PROCEDURE — 97161 PT EVAL LOW COMPLEX 20 MIN: CPT | Mod: GP | Performed by: PHYSICAL THERAPIST

## 2021-10-18 PROCEDURE — 250N000013 HC RX MED GY IP 250 OP 250 PS 637: Performed by: PHYSICIAN ASSISTANT

## 2021-10-18 PROCEDURE — 258N000003 HC RX IP 258 OP 636: Performed by: PHYSICIAN ASSISTANT

## 2021-10-18 PROCEDURE — 80048 BASIC METABOLIC PNL TOTAL CA: CPT | Performed by: HOSPITALIST

## 2021-10-18 PROCEDURE — 97116 GAIT TRAINING THERAPY: CPT | Mod: GP | Performed by: PHYSICAL THERAPIST

## 2021-10-18 RX ORDER — AMOXICILLIN 250 MG
1 CAPSULE ORAL 2 TIMES DAILY PRN
Qty: 20 TABLET | DISCHARGE
Start: 2021-10-18 | End: 2024-08-19

## 2021-10-18 RX ORDER — TRAMADOL HYDROCHLORIDE 50 MG/1
50 TABLET ORAL EVERY 6 HOURS PRN
Qty: 25 TABLET | Refills: 0 | Status: SHIPPED | OUTPATIENT
Start: 2021-10-18 | End: 2021-10-20

## 2021-10-18 RX ORDER — ACETAMINOPHEN 325 MG/1
650 TABLET ORAL EVERY 4 HOURS PRN
Qty: 20 TABLET | Refills: 0 | DISCHARGE
Start: 2021-10-20 | End: 2024-08-19

## 2021-10-18 RX ADMIN — CEFAZOLIN 1 G: 1 INJECTION, POWDER, FOR SOLUTION INTRAMUSCULAR; INTRAVENOUS at 04:25

## 2021-10-18 RX ADMIN — ACETAMINOPHEN 975 MG: 325 TABLET, FILM COATED ORAL at 09:23

## 2021-10-18 RX ADMIN — POLYETHYLENE GLYCOL 3350 17 G: 17 POWDER, FOR SOLUTION ORAL at 09:24

## 2021-10-18 RX ADMIN — ONDANSETRON 4 MG: 4 TABLET, ORALLY DISINTEGRATING ORAL at 11:23

## 2021-10-18 RX ADMIN — SODIUM CHLORIDE, POTASSIUM CHLORIDE, SODIUM LACTATE AND CALCIUM CHLORIDE: 600; 310; 30; 20 INJECTION, SOLUTION INTRAVENOUS at 00:33

## 2021-10-18 RX ADMIN — SIMVASTATIN 40 MG: 40 TABLET, FILM COATED ORAL at 20:34

## 2021-10-18 RX ADMIN — METOPROLOL SUCCINATE 25 MG: 25 TABLET, EXTENDED RELEASE ORAL at 09:23

## 2021-10-18 RX ADMIN — ACETAMINOPHEN 975 MG: 325 TABLET, FILM COATED ORAL at 23:59

## 2021-10-18 RX ADMIN — ENOXAPARIN SODIUM 40 MG: 40 INJECTION SUBCUTANEOUS at 11:23

## 2021-10-18 RX ADMIN — ACETAMINOPHEN 975 MG: 325 TABLET, FILM COATED ORAL at 00:28

## 2021-10-18 RX ADMIN — TRAMADOL HYDROCHLORIDE 50 MG: 50 TABLET, FILM COATED ORAL at 11:47

## 2021-10-18 RX ADMIN — SENNOSIDES AND DOCUSATE SODIUM 1 TABLET: 8.6; 5 TABLET ORAL at 09:22

## 2021-10-18 RX ADMIN — SERTRALINE HYDROCHLORIDE 12.5 MG: 25 TABLET ORAL at 09:23

## 2021-10-18 RX ADMIN — LEVOTHYROXINE SODIUM 88 MCG: 88 TABLET ORAL at 09:22

## 2021-10-18 RX ADMIN — ACETAMINOPHEN 975 MG: 325 TABLET, FILM COATED ORAL at 16:26

## 2021-10-18 ASSESSMENT — ACTIVITIES OF DAILY LIVING (ADL)
ADLS_ACUITY_SCORE: 14

## 2021-10-18 NOTE — PROGRESS NOTES
Care Transitions Team: Following for CC, discharge planning, and disposition.       Per [facility] Trauma Liaison Handoff:   Writer spoke with Katrina & daughter (Danika) via phone, introduced myself and explained my role in Katrina's plan of care. We discussed therapy recommendations for TCU and Danika provide the list of facilities below and requested care coordination team send referrals and udpate them with bed availability. Katrina would like a private room and they are aware of the additional cost for that.     Living situation:   Support:Daughter Danika    Home environment: [facility]   Increase service or equipment needs: None    Prior Function level:   Ambulation Cane independent   ADL's Bathing supervision    Current home care services: None    Family/patient discharge goal: Return to baseline level of function.     Barriers to Discharge: Medically stable    Discharge Plan of care: TCU stay   TCU - Medicare compare TCU list provided - CM discussed Medicare compare website and star ratings.  1.  [facility]  2.  [facility]  3. [facility]  4.[facility]   5. [facility]     Orders needed for services: Post Op Plan of Care - TCU- stay PT/OT to evaluate and treat.      Weight bearing status and Hip precautions: WBAT     Transportation:  to schedule - family is aware of the typical cost of transportation        Will follow in collaboration with [facility] CM  Kriss Will -[phone] [facility] for discharge planning.

## 2021-10-18 NOTE — PROGRESS NOTES
Orthopedic Surgery  Katrina Portillo  10/18/2021  Admit Date:  10/16/2021  POD 1 Day Post-Op  S/P Procedure(s):  INTERNAL FIXATION, FRACTURE, TROCHANTERIC, HIP, NAILING    Patient resting comfortably in chair.    Pain controlled.  Tolerating oral intake.    Denies nausea or vomiting  Denies chest pain or shortness of breath  No events overnight.     Alert and orient to person, place, and time.  Vital Sign Ranges  Temperature Temp  Av.3  F (36.8  C)  Min: 97.9  F (36.6  C)  Max: 98.9  F (37.2  C)   Blood pressure Systolic (24hrs), Av , Min:97 , Max:119        Diastolic (24hrs), Av, Min:47, Max:63      Pulse Pulse  Av.8  Min: 69  Max: 84   Respirations Resp  Avg: 15.2  Min: 10  Max: 18   Pulse oximetry SpO2  Av %  Min: 92 %  Max: 100 %       Dressing is clean, dry, and intact.   Right thigh swollen - wnl post operatively  Minimal erythema of the surrounding skin.   Bilateral calves are soft, non-tender.  Bilateral lower extremity is NVI.  Sensation intact bilateral lower extremities  5/5 motor with resisted dorsi and plantar flexion bilaterally  +Dp pulse    Labs:  Recent Labs   Lab Test 10/18/21  0823 10/17/21  0747 10/16/21  0856   POTASSIUM 3.9 4.0 3.9     Recent Labs   Lab Test 10/18/21  0823 10/17/21  0747 10/16/21  0856   HGB 9.0* 10.3* 11.5*     No results for input(s): INR in the last 37312 hours.  Recent Labs   Lab Test 10/17/21  0747 10/16/21  0856    242       A/P  1. S/p right intertrochanteric femur fracture IM nail   Continue Lovenox for DVT prophylaxis.     Mobilize with PT/OT    WBAT with walker   Leave dressing intact.     Continue current pain regiment.    2. Disposition   Anticipate d/c to TCU based on placement.    Lanette Melchor PA-C

## 2021-10-18 NOTE — PROGRESS NOTES
RT saw patient today regarding the use of CPAP. Patient refused CPAP and stated that she had not used it in years. RT to continue to follow as needed.

## 2021-10-18 NOTE — PROGRESS NOTES
[facility]    Hospitalist Progress Note      Assessment & Plan   Katrina Portillo is a 89 year old female who was admitted on 10/16/2021 with right hip fracture after a fall.    Right hip fracture s/p right hip cephalomedullary nail  Mechanical fall  Acute blood loss anemia  R hip nailing per Ortho 10/17/2021, EBL 40ml. Hgb on admit 11.5, preop was 10.3, POD 1 Hg 9  -Postop pain management, weightbearing status, DVT PxP per Ortho.  -PT/OT recommend TCU  - hgb in AM     HTN:  [PTA metoprolol and losartan]  -Metoprolol restarted on admission, losartan held.     Hypothyroidism:  -  Levothyroxine resumed     Depression:  -PTA sertraline restarted     History of murmur:  -  Hasn't wanted further eval on this.  Prior had an echo several years ago without major valve disease noted (was mild).  Also had negative stress test with normal heart function in 2017.  No recent cardiac complaints or worsening SOB.      MONCHO:  -  History of some sleep apnea.  Not tolerant of CPAP.  Improved/controls with sleeping on side.     Mildly Abnormal pre-op CXR:  -  Appears more fibrosis/old scarring.  No acute respiratory complaints.  O2 sats good on room air.     COVID-19 screening, negative  Fully vaccinated with Moderna in Feb 2021    Clinically Significant Risk Factors Present on Admission               DVT Prophylaxis: Enoxaparin (Lovenox) SQ  Code Status: Full Code  Expected discharge: 10/19/2021 recommended to transitional care unit once bed available    Patricia Whitten DO  Hospitalist Service  [facility]  Securely message with the Vocera Web Console (learn more here)  Text Page (7am - 6pm) via [facility] Paging/Directory      Interval History   Patient seen and examined. She is doing well. Has a little discomfort at the hip, but tolerable. She is agreeable to TCU stay after discharge, she thinks she will need a little help. A little nauseous this morning, but did tolerate some  breakfast. Tramadol helps with pain.    -Data reviewed today: I reviewed all new labs and imaging results over the last 24 hours. I personally reviewed no images or EKG's today.    Physical Exam   Temp: 98.9  F (37.2  C) Temp src: Oral BP: 105/49 Pulse: 83   Resp: 16 SpO2: 96 % O2 Device: None (Room air) Oxygen Delivery: 2 LPM  Vitals:    10/16/21 0842   Weight: 63.5 kg (140 lb)     Vital Signs with Ranges  Temp:  [97.9  F (36.6  C)-98.9  F (37.2  C)] 98.9  F (37.2  C)  Pulse:  [69-84] 83  Resp:  [10-18] 16  BP: ()/(47-63) 105/49  SpO2:  [92 %-100 %] 96 %  I/O last 3 completed shifts:  In: 1223 [P.O.:680; I.V.:543]  Out: 1100 [Urine:1100]    Constitutional: Awake, alert, cooperative, no apparent distress  Respiratory: Clear to auscultation bilaterally, no crackles or wheezing  Cardiovascular: Regular rate and rhythm, normal S1 and S2, and + systolic murmur noted  GI: Normal bowel sounds, soft, non-distended, non-tender  Skin/Integumen: No rashes, no cyanosis, trace lower extremity edema  Other:     Medications     lactated ringers 75 mL/hr at 10/18/21 0033       acetaminophen  975 mg Oral Q8H     enoxaparin ANTICOAGULANT  40 mg Subcutaneous Q24H     levothyroxine  88 mcg Oral Daily     metoprolol succinate ER  25 mg Oral Daily     polyethylene glycol  17 g Oral Daily     senna-docusate  1 tablet Oral BID     sertraline  12.5 mg Oral Daily     simvastatin  40 mg Oral At Bedtime     sodium chloride (PF)  3 mL Intracatheter Q8H       Data   Recent Labs   Lab 10/18/21  0823 10/17/21  0747 10/16/21  0856   WBC  --  9.8 8.6   HGB 9.0* 10.3* 11.5*   MCV  --  102* 101*   PLT  --  223 242    135 135   POTASSIUM 3.9 4.0 3.9   CHLORIDE 105 104 104   CO2 25 26 26   BUN 23 25 29   CR 1.11* 1.09* 1.15*   ANIONGAP 8 5 5   BOGDAN 8.1* 8.5 8.6   * 94 144*       No results found for this or any previous visit (from the past 24 hour(s)).

## 2021-10-18 NOTE — PROGRESS NOTES
10/18/21 1022   Quick Adds   Type of Visit Initial PT Evaluation   Living Environment   People in home alone   Current Living Arrangements assisted living   Home Accessibility no concerns   Living Environment Comments not sure if independent senior living    Self-Care   Usual Activity Tolerance good   Current Activity Tolerance fair   Regular Exercise Yes   Activity/Exercise Type strength training;walking   Exercise Amount/Frequency 3-5 times/wk   Equipment Currently Used at Home cane, straight;walker, rolling   Activity/Exercise/Self-Care Comment [place] using either cane or walker or furniture. has  2 days a week for IADLs, supervision with bathing,    Disability/Function   Walking or Climbing Stairs Difficulty yes   Walking or Climbing Stairs ambulation difficulty, requires equipment;transferring difficulty, requires equipment   Fall history within last six months yes   Number of times patient has fallen within last six months 1   Change in Functional Status Since Onset of Current Illness/Injury yes   General Information   Onset of Illness/Injury or Date of Surgery 10/16/21   Referring Physician Dr. Tirado   Patient/Family Therapy Goals Statement (PT) to go home with assist   Pertinent History of Current Problem (include personal factors and/or comorbidities that impact the POC)  89 year old female who presented to the [facility] ER with right hip pain after a slip/fall. POD#1 cephalamedullary nail right LE   Existing Precautions/Restrictions fall;no pivoting or twisting   Weight-Bearing Status - RLE weight-bearing as tolerated   Cognition   Orientation Status (Cognition) person;place;situation;time   Pain Assessment   Patient Currently in Pain Yes, see Vital Sign flowsheet  (2/10)   Integumentary/Edema   Integumentary/Edema Comments bandage over incisions   Posture    Posture Forward head position;Kyphosis   Range of Motion (ROM)   ROM Comment Decreased right UE at baseline but functional and other extremiites  functional except for right LE consistent with injury.   Strength   Strength Comments Pt has antigravity strength except for right LE consistent with injury and surgery.   Bed Mobility   Comment (Bed Mobility) supine to sit: modA   Transfers   Transfer Safety Comments sit to stand: cues for hand placement and with bed elevated modA   Gait/Stairs (Locomotion)   Comment (Gait/Stairs) Pt ambulated with FWW and Solo. Decreased foot clearance, decreased step length, decreased ability to weight shift. See flowsheet   Balance   Balance Comments requires AD and Assist.   Sensory Examination   Sensory Perception patient reports no sensory changes   Muscle Tone   Muscle Tone no deficits were identified   Clinical Impression   Criteria for Skilled Therapeutic Intervention yes, treatment indicated   PT Diagnosis (PT) impaired gait   Influenced by the following impairments pain, decreased ROM and strength in right LE, decreased tolerance for activity,    Functional limitations due to impairments below baseline for bed mob, transfers, gait and activity tolerance   Clinical Presentation Stable/Uncomplicated   Clinical Presentation Rationale clinical judgment, PMH, current level, support,    Clinical Decision Making (Complexity) low complexity   Therapy Frequency (PT) Daily   Predicted Duration of Therapy Intervention (days/wks) 2-3 days   Planned Therapy Interventions (PT) bed mobility training;gait training;home exercise program;patient/family education;ROM (range of motion);strengthening;transfer training   Risk & Benefits of therapy have been explained evaluation/treatment results reviewed;care plan/treatment goals reviewed;risks/benefits reviewed;current/potential barriers reviewed;participants voiced agreement with care plan;patient   PT Discharge Planning    PT Discharge Recommendation (DC Rec) Transitional Care Facility;home with assist;home with home care physical therapy   PT Rationale for DC Rec At baseline, pt is  independent with mobility using AD, independent with ADLs with supervision with bathing, gets assist with IADLs, has a joyful  2x/week. Becasue pt has to be alone and fully independent and currently needs modA of one and has limited functional gait, recommend TCU to increase independence prior to going home. If pt does return home, will need 24/7 care and assist for all mobility and ADLs.   PT Brief overview of current status  bed mob: modA, sit to stand: modA, gait with Solo for 15ft   Total Evaluation Time   Total Evaluation Time (Minutes) 15   Skin WDL   Procedural focused assessment (identify areas inspected)  Hip, right   Skin WDL X   Skin Color pale   Skin Temperature warm   Skin Moisture dry,flaky   Skin Integrity/Characteristics other (see comments)

## 2021-10-18 NOTE — PLAN OF CARE
Pt A&Ox4, VSS, CMS intact, dressing to right hip C,D,I, incontinent of urine at times, up with assist of 2 and walker to BS, passing flatus, scheduled tylenol for pain, IV SL, tolerating regular diet.

## 2021-10-18 NOTE — PLAN OF CARE
Alert and oriented, can have needs known. Up with assist of 2 with GB and walker. VSS. Dressing on right hip CDI. Consumed 75% of meal served. Voided adequately on BS commode. Pain medication administered as ordered. Saline lock on left arm, in place. Will continue to monitor.

## 2021-10-18 NOTE — PLAN OF CARE
Patient vital signs are at baseline: Yes  Patient able to ambulate as they were prior to admission or with assist devices provided by therapies during their stay:  No,  Reason:  Up with assist of 2/gb/walker to [facility], transferred with assist of 3/gb/walker to bed from [facility].   Patient MUST void prior to discharge:  Yes, incontinent, pure wick  in place for bedtime.  Patient able to tolerate oral intake:  Yes, poor oral intake.  IVF infusing.  Pain has adequate pain control using Oral analgesics:  No,  Reason:  Denies pain @ rest,  education provided on pain management.      Patient transferred for PACU @  1545. A&O x4, forgetful @ times. Dressing CDI. Will continue to monitor.

## 2021-10-18 NOTE — PLAN OF CARE
Patient vital signs are at baseline: Yes  Patient able to ambulate as they were prior to admission or with assist devices provided by therapies during their stay:  No,  Reason:  Unsteady previous shift.  Patient MUST void prior to discharge:  Yes, incontinent, purewick in place.  Patient able to tolerate oral intake:  Yes, poor appetite per report  Pain has adequate pain control using Oral analgesics:  Yes     Alert with forgetfulness. VSS. Capno removed, O2 sats on 90's on RA.  PIV left infusing with LR.  To continue to monitor.

## 2021-10-19 ENCOUNTER — APPOINTMENT (OUTPATIENT)
Dept: PHYSICAL THERAPY | Facility: CLINIC | Age: 86
DRG: 481 | End: 2021-10-19
Payer: COMMERCIAL

## 2021-10-19 LAB
ANION GAP SERPL CALCULATED.3IONS-SCNC: 5 MMOL/L (ref 3–14)
BUN SERPL-MCNC: 25 MG/DL (ref 7–30)
CALCIUM SERPL-MCNC: 7.9 MG/DL (ref 8.5–10.1)
CHLORIDE BLD-SCNC: 106 MMOL/L (ref 94–109)
CO2 SERPL-SCNC: 26 MMOL/L (ref 20–32)
CREAT SERPL-MCNC: 1.21 MG/DL (ref 0.52–1.04)
GFR SERPL CREATININE-BSD FRML MDRD: 39 ML/MIN/1.73M2
GLUCOSE BLD-MCNC: 93 MG/DL (ref 70–99)
HGB BLD-MCNC: 8 G/DL (ref 11.7–15.7)
MAGNESIUM SERPL-MCNC: 2 MG/DL (ref 1.6–2.3)
PHOSPHATE SERPL-MCNC: 2 MG/DL (ref 2.5–4.5)
PHOSPHATE SERPL-MCNC: 2.3 MG/DL (ref 2.5–4.5)
POTASSIUM BLD-SCNC: 4 MMOL/L (ref 3.4–5.3)
SODIUM SERPL-SCNC: 137 MMOL/L (ref 133–144)

## 2021-10-19 PROCEDURE — 250N000009 HC RX 250: Performed by: HOSPITALIST

## 2021-10-19 PROCEDURE — 84100 ASSAY OF PHOSPHORUS: CPT | Performed by: HOSPITALIST

## 2021-10-19 PROCEDURE — 258N000003 HC RX IP 258 OP 636: Performed by: HOSPITALIST

## 2021-10-19 PROCEDURE — 99232 SBSQ HOSP IP/OBS MODERATE 35: CPT | Performed by: HOSPITALIST

## 2021-10-19 PROCEDURE — 250N000013 HC RX MED GY IP 250 OP 250 PS 637: Performed by: HOSPITALIST

## 2021-10-19 PROCEDURE — 250N000013 HC RX MED GY IP 250 OP 250 PS 637: Performed by: PHYSICIAN ASSISTANT

## 2021-10-19 PROCEDURE — 85018 HEMOGLOBIN: CPT | Performed by: PHYSICIAN ASSISTANT

## 2021-10-19 PROCEDURE — 120N000001 HC R&B MED SURG/OB

## 2021-10-19 PROCEDURE — 36415 COLL VENOUS BLD VENIPUNCTURE: CPT | Performed by: PHYSICIAN ASSISTANT

## 2021-10-19 PROCEDURE — 80048 BASIC METABOLIC PNL TOTAL CA: CPT | Performed by: HOSPITALIST

## 2021-10-19 PROCEDURE — 97116 GAIT TRAINING THERAPY: CPT | Mod: GP | Performed by: PHYSICAL THERAPIST

## 2021-10-19 PROCEDURE — 97530 THERAPEUTIC ACTIVITIES: CPT | Mod: GP | Performed by: PHYSICAL THERAPIST

## 2021-10-19 PROCEDURE — 83735 ASSAY OF MAGNESIUM: CPT | Performed by: HOSPITALIST

## 2021-10-19 PROCEDURE — 250N000011 HC RX IP 250 OP 636: Performed by: INTERNAL MEDICINE

## 2021-10-19 PROCEDURE — 36415 COLL VENOUS BLD VENIPUNCTURE: CPT | Performed by: HOSPITALIST

## 2021-10-19 RX ORDER — FAMOTIDINE 10 MG
10 TABLET ORAL 2 TIMES DAILY
Status: DISCONTINUED | OUTPATIENT
Start: 2021-10-19 | End: 2021-10-20 | Stop reason: HOSPADM

## 2021-10-19 RX ORDER — CALCIUM CARBONATE 500 MG/1
1000 TABLET, CHEWABLE ORAL 3 TIMES DAILY PRN
Status: DISCONTINUED | OUTPATIENT
Start: 2021-10-19 | End: 2021-10-20 | Stop reason: HOSPADM

## 2021-10-19 RX ADMIN — POLYETHYLENE GLYCOL 3350 17 G: 17 POWDER, FOR SOLUTION ORAL at 10:09

## 2021-10-19 RX ADMIN — SENNOSIDES AND DOCUSATE SODIUM 1 TABLET: 8.6; 5 TABLET ORAL at 10:08

## 2021-10-19 RX ADMIN — CALCIUM CARBONATE (ANTACID) CHEW TAB 500 MG 1000 MG: 500 CHEW TAB at 14:13

## 2021-10-19 RX ADMIN — SENNOSIDES AND DOCUSATE SODIUM 1 TABLET: 8.6; 5 TABLET ORAL at 20:29

## 2021-10-19 RX ADMIN — ENOXAPARIN SODIUM 30 MG: 30 INJECTION SUBCUTANEOUS at 11:39

## 2021-10-19 RX ADMIN — FAMOTIDINE 10 MG: 10 TABLET ORAL at 20:28

## 2021-10-19 RX ADMIN — SIMVASTATIN 40 MG: 40 TABLET, FILM COATED ORAL at 20:29

## 2021-10-19 RX ADMIN — LEVOTHYROXINE SODIUM 88 MCG: 88 TABLET ORAL at 10:14

## 2021-10-19 RX ADMIN — SODIUM CHLORIDE, POTASSIUM CHLORIDE, SODIUM LACTATE AND CALCIUM CHLORIDE 500 ML: 600; 310; 30; 20 INJECTION, SOLUTION INTRAVENOUS at 11:45

## 2021-10-19 RX ADMIN — SODIUM PHOSPHATE, MONOBASIC, MONOHYDRATE 9 MMOL: 276; 142 INJECTION, SOLUTION INTRAVENOUS at 17:52

## 2021-10-19 RX ADMIN — ACETAMINOPHEN 975 MG: 325 TABLET, FILM COATED ORAL at 16:52

## 2021-10-19 RX ADMIN — ACETAMINOPHEN 975 MG: 325 TABLET, FILM COATED ORAL at 10:08

## 2021-10-19 RX ADMIN — SERTRALINE HYDROCHLORIDE 12.5 MG: 25 TABLET ORAL at 10:08

## 2021-10-19 ASSESSMENT — ACTIVITIES OF DAILY LIVING (ADL)
ADLS_ACUITY_SCORE: 14

## 2021-10-19 NOTE — PLAN OF CARE
Pt A&Ox4, VSS, CMS intact, dressing to right hip C,D,I, incontinent of urine at times, up with assist of 2 and walker to BSC, passing flatus, scheduled tylenol for pain, IV bolus for soft SBP, IV phosphorus dose infusing per protocol, tolerating regular diet, discharge planning to TCU.

## 2021-10-19 NOTE — PROGRESS NOTES
[facility]    Hospitalist Progress Note      Assessment & Plan   Katrina Portillo is a 89 year old female who was admitted on 10/16/2021 with right hip fracture after a fall.    Right hip fracture s/p right hip cephalomedullary nail  Mechanical fall  Acute blood loss anemia  R hip nailing per Ortho 10/17/2021, EBL 40ml. Hgb on admit 11.5, preop was 10.3  -Postop pain management, weightbearing status, DVT PxP per Ortho.  -PT/OT recommend TCU  - Hgb drop POD2 to 8  - Hgb in AM, transfuse if hgb<7  - given additional 500ml IVF due to poor oral intake and lower blood pressures. Start pepcid BID to see if this helps her intake. Check mag/phos and replete if needed     HTN:  [PTA metoprolol and losartan]  - Metoprolol restarted on admission with hold parameters but lower BP  - losartan on hold     Hypothyroidism:  -  Levothyroxine resumed     Depression:  -PTA sertraline resumed    CKD Stage 3  Cr 1.1-1.2 at baseline. Cr 1.15 on admit  - Monitor     History of murmur:  -  Hasn't wanted further eval on this.  Prior had an echo several years ago without major valve disease noted (was mild).  Also had negative stress test with normal heart function in 2017.  No recent cardiac complaints or worsening SOB.     MONHCO:  -  History of some sleep apnea.  Not tolerant of CPAP.  Improved/controls with sleeping on side.     Mildly Abnormal pre-op CXR:  -  Appears more fibrosis/old scarring.  No acute respiratory complaints.  O2 sats good on room air.     COVID-19 screening, negative  Fully vaccinated with Moderna in Feb 2021    Clinically Significant Risk Factors Present on Admission               DVT Prophylaxis: Enoxaparin (Lovenox) SQ  Code Status: Full Code  Expected discharge: 10/20/2021 recommended to transitional care unit once bed available and BP stable.    Patricia Whitten DO  Hospitalist Service  [facility]  Securely message with the Vocera Web Console (learn more  here)  Text Page (7am - 6pm) via [facility] Paging/Directory      Interval History   Patient seen and examined. She is doing okay. Appetite is poor, she is only tolerating maybe 50% of meals. Has some low grade nausea/indigestion and tums were ordered. Up with therapies with shuffling gait, but she feels like she is doing pretty well. Pain pretty well controlled with tylenol. No chest pain, shortness of breath, nausea, vomiting, fevers, chills.    -Data reviewed today: I reviewed all new labs and imaging results over the last 24 hours. I personally reviewed no images or EKG's today.    Physical Exam   Temp: 98.4  F (36.9  C) Temp src: Oral BP: 93/49 Pulse: 84   Resp: 16 SpO2: 98 % O2 Device: None (Room air)    Vitals:    10/16/21 0842   Weight: 63.5 kg (140 lb)     Vital Signs with Ranges  Temp:  [97.7  F (36.5  C)-98.4  F (36.9  C)] 98.4  F (36.9  C)  Pulse:  [80-85] 84  Resp:  [16] 16  BP: ()/(40-57) 93/49  SpO2:  [94 %-98 %] 98 %  I/O last 3 completed shifts:  In: 680 [P.O.:680]  Out: -     Constitutional: Awake, alert, cooperative, no apparent distress  Respiratory: Clear to auscultation bilaterally, no crackles or wheezing  Cardiovascular: Regular rate and rhythm, normal S1 and S2, and + systolic murmur noted  GI: Normal bowel sounds, soft, non-distended, non-tender  Skin/Integumen: No rashes, no cyanosis, trace lower extremity edema  Other: Right hip dressing intact.    Medications       acetaminophen  975 mg Oral Q8H     enoxaparin ANTICOAGULANT  30 mg Subcutaneous Q24H     famotidine  10 mg Oral BID     lactated ringers  500 mL Intravenous Once     levothyroxine  88 mcg Oral Daily     metoprolol succinate ER  25 mg Oral Daily     polyethylene glycol  17 g Oral Daily     senna-docusate  1 tablet Oral BID     sertraline  12.5 mg Oral Daily     simvastatin  40 mg Oral At Bedtime     sodium chloride (PF)  3 mL Intracatheter Q8H       Data   Recent Labs   Lab 10/19/21  0735 10/18/21  0823 10/17/21  0747  10/16/21  0856 10/16/21  0856   WBC  --   --  9.8  --  8.6   HGB 8.0* 9.0* 10.3*   < > 11.5*   MCV  --   --  102*  --  101*   PLT  --   --  223  --  242    138 135   < > 135   POTASSIUM 4.0 3.9 4.0   < > 3.9   CHLORIDE 106 105 104   < > 104   CO2 26 25 26   < > 26   BUN 25 23 25   < > 29   CR 1.21* 1.11* 1.09*   < > 1.15*   ANIONGAP 5 8 5   < > 5   BOGDAN 7.9* 8.1* 8.5   < > 8.6   GLC 93 108* 94   < > 144*    < > = values in this interval not displayed.       No results found for this or any previous visit (from the past 24 hour(s)).

## 2021-10-19 NOTE — PROGRESS NOTES
Orthopedic Surgery  Katrina Portillo  10/19/2021  Admit Date:  10/16/2021  POD 2 Days Post-Op  S/P Procedure(s):  INTERNAL FIXATION, FRACTURE, TROCHANTERIC, HIP, NAILING    Patient resting comfortably in chair.    Pain controlled.  Tolerating oral intake.    Denies nausea or vomiting  Denies chest pain or shortness of breath  No events overnight.      Alert and orient to person, place, and time.  Vital Sign Ranges  Temperature Temp  Av.1  F (36.7  C)  Min: 97.7  F (36.5  C)  Max: 98.4  F (36.9  C)   Blood pressure Systolic (24hrs), Av , Min:91 , Max:112        Diastolic (24hrs), Av, Min:40, Max:57      Pulse Pulse  Av.5  Min: 80  Max: 85   Respirations Resp  Av  Min: 16  Max: 16   Pulse oximetry SpO2  Av.3 %  Min: 94 %  Max: 98 %       Dressing is clean, dry, and intact.   Right thigh swollen - wnl post operatively  Minimal erythema of the surrounding skin.   Bilateral calves are soft, non-tender.  Bilateral lower extremity is NVI.  Sensation intact bilateral lower extremities  5/5 motor with resisted dorsi and plantar flexion bilaterally  +Dp pulse    Labs:  Recent Labs   Lab Test 10/19/21  0735 10/18/21  0823 10/17/21  0747   POTASSIUM 4.0 3.9 4.0     Recent Labs   Lab Test 10/19/21  0735 10/18/21  0823 10/17/21  0747   HGB 8.0* 9.0* 10.3*     No results for input(s): INR in the last 53856 hours.  Recent Labs   Lab Test 10/17/21  0747 10/16/21  0856    242       A/P  1. S/p right intertrochanteric femur fracture IM nail              Continue Lovenox for DVT prophylaxis.                Mobilize with PT/OT               WBAT with walker              Leave dressing intact.                Continue current pain regiment.     2. Disposition              Anticipate d/c to TCU based on placement.    Lanette Melchor PA-C

## 2021-10-19 NOTE — PROGRESS NOTES
Care Management Follow Up    Length of Stay (days): 3    Expected Discharge Date: 10/20/2021     Concerns to be Addressed: discharge planning     Patient plan of care discussed at interdisciplinary rounds: Yes    Anticipated Discharge Disposition: Skilled Nursing Facilty, Transitional Care     Anticipated Discharge Services: None  Anticipated Discharge DME: None    Patient/family educated on Medicare website which has current facility and service quality ratings: yes  Education Provided on the Discharge Plan:    Patient/Family in Agreement with the Plan: yes    Referrals Placed by CM/SW: Post Acute Facilities  Private pay costs discussed: Not applicable    Additional Information:  Patient accepted to [facility] tomorrow. Writer spoke to patient's daughter Radha to update. She is in agreement with [facility] and would like to see patient go there. Writer met with patient and she was unsure. Writer offered time for her to discuss with her family and SW will meet with her in the AM. She was agreeable to that. Call placed to daughter Radha to update. She would like patient to go [facility]. Call to [facility] to update as well.      LO Marie

## 2021-10-19 NOTE — PLAN OF CARE
Alert and orient, able to have her needs known. CMS intact. Metoprolol  held due to set parameters.Assist of 2 with transfers using GB and walker. Dressing CDI. Voiding adequately. Consumed 50% of meal served. Up on chair. Will continue to monitor.

## 2021-10-19 NOTE — PLAN OF CARE
OT: Orders received. Chart reviewed and discussed with care team.  OT not indicated due to: Pt. is currently moving w/ mod. A per PT, recommended discharge location is TCU. In order to best utilize OT services, will defer to TCU setting.  Defer discharge recommendations to PT.  Will complete orders.

## 2021-10-19 NOTE — PLAN OF CARE
Patient is A&O, VSS on RA, CMS intact, up with assist of 2, voiding adequately in the bathroom, and dressing CDI. She denies pain and slept through the night. Will continue to monitor

## 2021-10-20 ENCOUNTER — LAB REQUISITION (OUTPATIENT)
Dept: LAB | Facility: CLINIC | Age: 86
End: 2021-10-20

## 2021-10-20 VITALS
BODY MASS INDEX: 23.9 KG/M2 | WEIGHT: 140 LBS | OXYGEN SATURATION: 96 % | HEIGHT: 64 IN | RESPIRATION RATE: 18 BRPM | TEMPERATURE: 98 F | HEART RATE: 85 BPM | SYSTOLIC BLOOD PRESSURE: 111 MMHG | DIASTOLIC BLOOD PRESSURE: 59 MMHG

## 2021-10-20 DIAGNOSIS — Z11.1 ENCOUNTER FOR SCREENING FOR RESPIRATORY TUBERCULOSIS: ICD-10-CM

## 2021-10-20 LAB
ANION GAP SERPL CALCULATED.3IONS-SCNC: 4 MMOL/L (ref 3–14)
BUN SERPL-MCNC: 25 MG/DL (ref 7–30)
CALCIUM SERPL-MCNC: 8.2 MG/DL (ref 8.5–10.1)
CHLORIDE BLD-SCNC: 107 MMOL/L (ref 94–109)
CO2 SERPL-SCNC: 28 MMOL/L (ref 20–32)
CREAT SERPL-MCNC: 0.99 MG/DL (ref 0.52–1.04)
ERYTHROCYTE [DISTWIDTH] IN BLOOD BY AUTOMATED COUNT: 12.3 % (ref 10–15)
GFR SERPL CREATININE-BSD FRML MDRD: 50 ML/MIN/1.73M2
GLUCOSE BLD-MCNC: 96 MG/DL (ref 70–99)
HCT VFR BLD AUTO: 25.3 % (ref 35–47)
HGB BLD-MCNC: 8.1 G/DL (ref 11.7–15.7)
MCH RBC QN AUTO: 32.9 PG (ref 26.5–33)
MCHC RBC AUTO-ENTMCNC: 32 G/DL (ref 31.5–36.5)
MCV RBC AUTO: 103 FL (ref 78–100)
PHOSPHATE SERPL-MCNC: 2.6 MG/DL (ref 2.5–4.5)
PLATELET # BLD AUTO: 259 10E3/UL (ref 150–450)
POTASSIUM BLD-SCNC: 4.1 MMOL/L (ref 3.4–5.3)
RBC # BLD AUTO: 2.46 10E6/UL (ref 3.8–5.2)
SODIUM SERPL-SCNC: 139 MMOL/L (ref 133–144)
WBC # BLD AUTO: 11.1 10E3/UL (ref 4–11)

## 2021-10-20 PROCEDURE — 85027 COMPLETE CBC AUTOMATED: CPT | Performed by: HOSPITALIST

## 2021-10-20 PROCEDURE — 250N000013 HC RX MED GY IP 250 OP 250 PS 637: Performed by: PHYSICIAN ASSISTANT

## 2021-10-20 PROCEDURE — 99239 HOSP IP/OBS DSCHRG MGMT >30: CPT | Performed by: HOSPITALIST

## 2021-10-20 PROCEDURE — 84100 ASSAY OF PHOSPHORUS: CPT | Performed by: HOSPITALIST

## 2021-10-20 PROCEDURE — 250N000013 HC RX MED GY IP 250 OP 250 PS 637: Performed by: INTERNAL MEDICINE

## 2021-10-20 PROCEDURE — 80048 BASIC METABOLIC PNL TOTAL CA: CPT | Performed by: HOSPITALIST

## 2021-10-20 PROCEDURE — 250N000013 HC RX MED GY IP 250 OP 250 PS 637: Performed by: HOSPITALIST

## 2021-10-20 PROCEDURE — 250N000011 HC RX IP 250 OP 636: Performed by: INTERNAL MEDICINE

## 2021-10-20 PROCEDURE — 36415 COLL VENOUS BLD VENIPUNCTURE: CPT | Performed by: HOSPITALIST

## 2021-10-20 RX ORDER — LOSARTAN POTASSIUM 100 MG/1
100 TABLET ORAL DAILY
DISCHARGE
Start: 2021-10-27 | End: 2021-11-09

## 2021-10-20 RX ORDER — FAMOTIDINE 10 MG
10 TABLET ORAL 2 TIMES DAILY
DISCHARGE
Start: 2021-10-20 | End: 2021-10-25

## 2021-10-20 RX ORDER — FERROUS SULFATE 325(65) MG
325 TABLET ORAL EVERY OTHER DAY
DISCHARGE
Start: 2021-10-20 | End: 2024-08-19

## 2021-10-20 RX ORDER — TRAMADOL HYDROCHLORIDE 50 MG/1
25-50 TABLET ORAL EVERY 6 HOURS PRN
Qty: 10 TABLET | Refills: 0 | Status: SHIPPED | OUTPATIENT
Start: 2021-10-20 | End: 2021-11-02

## 2021-10-20 RX ORDER — CALCIUM CARBONATE 500 MG/1
1 TABLET, CHEWABLE ORAL 3 TIMES DAILY PRN
DISCHARGE
Start: 2021-10-20

## 2021-10-20 RX ADMIN — ACETAMINOPHEN 975 MG: 325 TABLET, FILM COATED ORAL at 08:30

## 2021-10-20 RX ADMIN — LEVOTHYROXINE SODIUM 88 MCG: 88 TABLET ORAL at 08:30

## 2021-10-20 RX ADMIN — SERTRALINE HYDROCHLORIDE 12.5 MG: 25 TABLET ORAL at 08:30

## 2021-10-20 RX ADMIN — ACETAMINOPHEN 975 MG: 325 TABLET, FILM COATED ORAL at 00:51

## 2021-10-20 RX ADMIN — TRAMADOL HYDROCHLORIDE 50 MG: 50 TABLET, FILM COATED ORAL at 11:08

## 2021-10-20 RX ADMIN — METOPROLOL SUCCINATE 25 MG: 25 TABLET, EXTENDED RELEASE ORAL at 08:30

## 2021-10-20 RX ADMIN — SENNOSIDES AND DOCUSATE SODIUM 1 TABLET: 8.6; 5 TABLET ORAL at 08:30

## 2021-10-20 RX ADMIN — FAMOTIDINE 10 MG: 10 TABLET ORAL at 08:30

## 2021-10-20 RX ADMIN — ENOXAPARIN SODIUM 40 MG: 40 INJECTION SUBCUTANEOUS at 11:08

## 2021-10-20 ASSESSMENT — ACTIVITIES OF DAILY LIVING (ADL)
ADLS_ACUITY_SCORE: 11
ADLS_ACUITY_SCORE: 11

## 2021-10-20 NOTE — PROGRESS NOTES
Care Management Discharge Note    Discharge Date: 10/20/2021  Expected Time of Departure: 1330    Discharge Disposition: Transitional Care    Discharge Services: None    Discharge DME: None    Discharge Transportation: agency    Private pay costs discussed: private room/amenity fees    PAS Confirmation Code: [id number]  Patient/family educated on Medicare website which has current facility and service quality ratings: no    Education Provided on the Discharge Plan:    Persons Notified of Discharge Plans: patient and daughter, Danika  Patient/Family in Agreement with the Plan: yes    Handoff Referral Completed: Yes    Additional Information:  Discharge orders were received. Private bed available at [facility] for 1330 ride. Faxed orders and scripts to [facility]. Completed PAS and sent to facility and put in chart. Danika is aware of the private room fee and was fine with this.     PAS-RR    D: Per DHS regulation, SW completed and submitted PAS-RR to [place] [employer] via the [employer].  PAS-RR confirmation # is : [id number]    I: SW spoke with patient and they are aware a PAS-RR has been submitted.  SW reviewed with patient that they may be contacted for a follow up appointment within 10 days of hospital discharge if their SNF stay is < 30 days.  Contact information for [employer] was also provided.    A: Patient verbalized understanding.    P: Further questions may be directed to [employer] at #[phone], option #4 for PAS-RR staff.    LO Omer

## 2021-10-20 NOTE — PLAN OF CARE
R hip surgery - CDI. AOx4. Soft BP otherwise VSS on RA. Ax2 w/GB and W - WB on R hip. RDA. Denies pain. nereida @ night. incont @ times. +flatus +BS -BM. P 2.0 - replaced and recheck scheduled for tomorrow morning. PIV SL. Expected discharge tomorrow to [facility].

## 2021-10-20 NOTE — PROGRESS NOTES
Care Management Follow Up    Length of Stay (days): 4    Expected Discharge Date: 10/20/2021     Concerns to be Addressed: discharge planning     Patient plan of care discussed at interdisciplinary rounds: Yes    Anticipated Discharge Disposition: Skilled Nursing Facilty, Transitional Care     Anticipated Discharge Services: None  Anticipated Discharge DME: None    Patient/family educated on Medicare website which has current facility and service quality ratings: yes  Education Provided on the Discharge Plan:    Patient/Family in Agreement with the Plan: yes    Referrals Placed by CM/SW: Post Acute Facilities  Private pay costs discussed: Not applicable    Additional Information:  Talked to Katrina and she said that she agreed to going to [facility]. Paged physician and asked for discharge orders to be put in. Physician stated that patient would be ready to go this afternoon. Called [facility] and let her know that patient would be ready to go today and she said they could pick her up at 1330 today. She asked if patient wanted a semi-private or private room. Call placed to Radha to ask what room she would prefer. Rad[facility] called and said they would prefer a private room. Call placed to [facility] and let them know about patient wanting private room.    Will continue to follow.    LO Omer

## 2021-10-20 NOTE — PROGRESS NOTES
Orthopedic Surgery  Katrina Portillo  10/20/2021  Admit Date:  10/16/2021  POD: 3 Days Post-Op   Procedure(s):  INTERNAL FIXATION, FRACTURE, TROCHANTERIC, HIP, NAILING    Alert and oriented to person, place, and time.  Patient resting comfortably in bed.    Pain controlled.  Tolerating oral intake.      Vital Sign Ranges  Temperature Temp  Av.7  F (37.1  C)  Min: 98  F (36.7  C)  Max: 99.1  F (37.3  C)   Blood pressure Systolic (24hrs), Av , Min:95 , Max:111        Diastolic (24hrs), Av, Min:46, Max:60      Pulse Pulse  Av  Min: 85  Max: 93   Respirations Resp  Av.3  Min: 15  Max: 18   Pulse oximetry SpO2  Av.8 %  Min: 94 %  Max: 98 %       Dressing is clean, dry, and intact.   Right thigh swelling present but compressible.    Bilateral calves are soft, non-tender.  Right lower extremity is NVI.  Sensation intact bilateral lower extremities  Patient able to resist dorsi and plantar flexion bilaterally  +Dp pulse    Labs:  Recent Labs   Lab Test 10/20/21  0754 10/19/21  0735 10/18/21  0823   POTASSIUM 4.1 4.0 3.9     Recent Labs   Lab Test 10/20/21  0754 10/19/21  0735 10/18/21  0823   HGB 8.1* 8.0* 9.0*     No results for input(s): INR in the last 52577 hours.  Recent Labs   Lab Test 10/20/21  0754 10/17/21  0747 10/16/21  0856    223 242          A/P  1. S/p right intertrochanteric femur fracture IM nail              Continue Lovenox for DVT prophylaxis.  ASA at discharge              Mobilize with PT/OT.  Requesting pain med before PT.                WBAT with walker              Leave dressing intact.                Continue current pain regiment.     2. Disposition              Anticipate d/c to TCU based on placement.  Ortho stable.     Thelma Roman PA-C

## 2021-10-20 NOTE — DISCHARGE SUMMARY
[facility]    Discharge Summary  Hospitalist    Date of Admission:  10/16/2021  Date of Discharge:  10/20/2021  Discharging Provider: Patricia Whitten DO  Date of Service (when I saw the patient): 10/20/21    Discharge Diagnoses   Right hip fracture s/p right hip cephalomedullary nail  Mechanical fall  Acute blood loss anemia  HTN  Hypothyroidism  Depression  CKD Stage 3  History of murmur:  MONCHO  Mildly Abnormal pre-op CXR:  COVID-19 screening, negative    History of Present Illness   Katrina Portillo is an 89 year old female who presented with right hip fracture after fall. Underwent right cephalomedullary nail on 10/17/21    Hospital Course   Katrina Portillo was admitted on 10/16/2021.  The following problems were addressed during her hospitalization:    Right hip fracture s/p right hip cephalomedullary nail  Mechanical fall  Acute blood loss anemia  R hip nailing per Ortho 10/17/2021, EBL 40ml. Hgb on admit 11.5, preop was 10.3  -Postop pain management, weightbearing status, DVT PxP per Ortho.  - PT/OT recommend TCU  - Hgb drop POD2 to 8, stable near there on POD3.  - start iron supplement every other day instead of daily for easier absorption  - ASA BID for DVT prophylaxis     HTN  [PTA metoprolol and losartan]  - Metoprolol restarted with good BP control  - losartan on hold, resume at TCU as BPs allow     Hypothyroidism  -  Levothyroxine resumed     Depression:  -PTA sertraline resumed     CKD Stage 3  Cr 1.1-1.2 at baseline. Cr 1.15 on admit  - Stable on discharge     History of murmur:  -  Hasn't wanted further eval on this.  Prior had an echo several years ago without major valve disease noted (was mild).  Also had negative stress test with normal heart function in 2017.  No recent cardiac complaints or worsening SOB.     MONCHO  -  History of some sleep apnea.  Not tolerant of CPAP.  Improved/controls with sleeping on side.     Mildly Abnormal pre-op CXR:  -  Appears more  fibrosis/old scarring.  No acute respiratory complaints.  O2 sats good on room air.     Indigestion  Improved with BID pepcid and PRN tums in hospital  - continue on transition to TCU    COVID-19 screening, negative  Fully vaccinated with Moderna in Feb 2021    Patricia Whitten, DO    Significant Results and Procedures   10/17/21: right hip cephalomedullary nail    Pending Results   NA    Code Status   Full Code       Primary Care Physician   Khadra Hernandez    Physical Exam   Temp: 98  F (36.7  C) Temp src: Oral BP: 111/59 Pulse: 85   Resp: 18 SpO2: 96 % O2 Device: None (Room air)    Vitals:    10/16/21 0842   Weight: 63.5 kg (140 lb)     Vital Signs with Ranges  Temp:  [98  F (36.7  C)-99  F (37.2  C)] 98  F (36.7  C)  Pulse:  [85-93] 85  Resp:  [15-18] 18  BP: (100-111)/(52-60) 111/59  SpO2:  [94 %-98 %] 96 %  I/O last 3 completed shifts:  In: -   Out: 600 [Urine:600]    Patient seen and examined. She feels better. Appetite much improved, ate better today than yesterday. Trialed tramadol for pain control this morning and hopeful that it will control her well without making her feel woozy. No chest pain, shortness of breath, nausea, vomiting, fevers, chills. Stable for discharge to TCU    Constitutional: Awake, alert, cooperative, no apparent distress.  Eyes: Conjunctiva and pupils examined and normal.  HEENT: Moist mucous membranes, normal dentition.  Respiratory: Clear to auscultation bilaterally, no crackles or wheezing.  Cardiovascular: Regular rate and rhythm, normal S1 and S2, and + systolic murmur  GI: Soft, non-distended, non-tender, normal bowel sounds.  Lymph/Hematologic: No anterior cervical or supraclavicular adenopathy.  Skin: No rashes, no cyanosis, trace lower extremity edema.  Musculoskeletal: No joint erythema. Right hip dressing intact with some mild swelling, but compartments soft  Neurologic: Cranial nerves 2-12 intact, normal strength and sensation.  Psychiatric: Alert, oriented to person, place  and time, no obvious anxiety or depression.    Discharge Disposition   Discharged to TCU  Condition at discharge: Stable    Consultations This Hospital Stay   ORTHOPEDIC SURGERY IP CONSULT  CARE MANAGEMENT / SOCIAL WORK IP CONSULT  PHYSICAL THERAPY ADULT IP CONSULT  OCCUPATIONAL THERAPY ADULT IP CONSULT  PHYSICAL THERAPY ADULT IP CONSULT  OCCUPATIONAL THERAPY ADULT IP CONSULT    Time Spent on this Encounter   Patricia FRANKLIN DO, personally saw the patient today and spent greater than 30 minutes discharging this patient.    Discharge Orders      General info for SNF    Length of Stay Estimate: Short Term Care: Estimated # of Days <30  Condition at Discharge: Improving  Level of care:skilled   Rehabilitation Potential: Good  Admission H&P remains valid and up-to-date: Yes  Recent Chemotherapy: N/A  Use Nursing Home Standing Orders: Yes     Mantoux instructions    Give two-step Mantoux (PPD) Per Facility Policy Yes     Reason for your hospital stay    Right hip fracture: Long IM Nail     Wound care (specify)    Site:   Right hip   Instructions:  Keep dressings clean, dry and intact.  Change if peeling off or >60% saturated.  Do not immerse.  Ok to shower over tegaderm and aquacel dressings.     Activity - Up with assistive device    Up with walker/assist     Weight bearing status    WBAT Right LE with walker     Follow Up and recommended labs and tests    Follow up with Dr. Tirado/Tomasa CHEN at [facility] 2 weeks after surgery.  If still in TCU, can be seen by the orthopedic provider at the care facility (if this is an option).    Call the care coordinator at [phone] to arrange the appt.   [facility]: [phone]  [facility]: [phone]    Follow up with PCP within one week after discharge from TCU     Brief Discharge Instructions    1. Losartan was held during hospitalization due to low blood pressures. This can be added back as appropriate.    2. She was started on iron supplement every other day due to acute  blood loss anemia related to surgery    3. Due to indigestion, she was given tums and started on pepcid BID while in hospital, this can be discontinued/made PRN if her symptoms improve/resolve.     Physical Therapy Adult Consult    Evaluate and treat as clinically indicated.    Reason:  Right hip fracture: Long IM Nail     Occupational Therapy Adult Consult    Evaluate and treat as clinically indicated.    Reason:  Right hip fracture: Long IM Nail     Fall precautions     Crutches DME    DME Documentation: Describe the reason for need to support medical necessity: Impaired gait status post hip surgery. I, the undersigned, certify that the above prescribed supplies are medically necessary for this patient and is both reasonable and necessary in reference to accepted standards of medical practice in the treatment of this patient's condition and is not prescribed as a convenience.     Cane DME    DME Documentation: Describe the reason for need to support medical necessity: Impaired gait status post hip surgery. I, the undersigned, certify that the above prescribed supplies are medically necessary for this patient and is both reasonable and necessary in reference to accepted standards of medical practice in the treatment of this patient's condition and is not prescribed as a convenience.     Walker DME    : DME Documentation: Describe the reason for need to support medical necessity: Impaired gait status post hip surgery. I, the undersigned, certify that the above prescribed supplies are medically necessary for this patient and is both reasonable and necessary in reference to accepted standards of medical practice in the treatment of this patient's condition and is not prescribed as a convenience.     Diet    Follow this diet upon discharge: Regular Diet Adult     Discharge Medications   Discharge Medication List as of 10/20/2021  1:27 PM      START taking these medications    Details   aspirin (ASA) 81 MG EC tablet  Take 1 tablet (81 mg) by mouth 2 times daily, Disp-60 tablet, R-0, Transitional      calcium carbonate (TUMS) 500 MG chewable tablet Take 1 tablet (500 mg) by mouth 3 times daily as needed for heartburn, Transitional      famotidine (PEPCID) 10 MG tablet Take 1 tablet (10 mg) by mouth 2 times daily for 5 days, Transitional      ferrous sulfate (FEROSUL) 325 (65 Fe) MG tablet Take 1 tablet (325 mg) by mouth every other day, Transitional      senna-docusate (SENOKOT-S/PERICOLACE) 8.6-50 MG tablet Take 1 tablet by mouth 2 times daily as needed for constipation, Disp-20 tablet, Transitional         CONTINUE these medications which have CHANGED    Details   acetaminophen (TYLENOL) 325 MG tablet Take 2 tablets (650 mg) by mouth every 4 hours as needed for other (For optimal non-opioid multimodal pain management to improve pain control.), Disp-20 tablet, R-0, Transitional      losartan (COZAAR) 100 MG tablet Take 1 tablet (100 mg) by mouth daily, Transitional      traMADol (ULTRAM) 50 MG tablet Take 0.5-1 tablets (25-50 mg) by mouth every 6 hours as needed for moderate pain or severe pain, Disp-10 tablet, R-0, Local Print         CONTINUE these medications which have NOT CHANGED    Details   alendronate (FOSAMAX) 70 MG tablet Take 70 mg by mouth every 7 days On Mondays, Historical      CALCIUM PO Take 1 tablet by mouth daily, Historical      levothyroxine (SYNTHROID/LEVOTHROID) 88 MCG tablet Take 88 mcg by mouth daily, Historical      metoprolol succinate ER (TOPROL-XL) 25 MG 24 hr tablet Take 25 mg by mouth daily, Historical      nitroGLYcerin (NITROSTAT) 0.4 MG sublingual tablet Place 0.4 mg under the tongue every 5 minutes as needed for chest pain For chest pain place 1 tablet under the tongue every 5 minutes for 3 doses. If symptoms persist 5 minutes after 1st dose call 911., Historical      sertraline (ZOLOFT) 25 MG tablet Take 12.5 mg by mouth daily, Historical      simvastatin (ZOCOR) 40 MG tablet Take 40 mg by  mouth At Bedtime, Historical      Vitamin D, Cholecalciferol, 25 MCG (1000 UT) TABS Take 1 tablet by mouth daily, Historical           Allergies   Allergies   Allergen Reactions     Procaine      Other reaction(s): Tachycardia     Tetanus Toxoids      Latex      Possible allergy historically per patient     Tetanus Antitoxin      Other reaction(s): Edema  Arm doubled in size     Data   Most Recent 3 CBC's:  Recent Labs   Lab Test 10/20/21  0754 10/19/21  0735 10/18/21  0823 10/17/21  0747 10/17/21  0747 10/16/21  0856 10/16/21  0856   WBC 11.1*  --   --   --  9.8  --  8.6   HGB 8.1* 8.0* 9.0*   < > 10.3*   < > 11.5*   *  --   --   --  102*  --  101*     --   --   --  223  --  242    < > = values in this interval not displayed.      Most Recent 3 BMP's:  Recent Labs   Lab Test 10/20/21  0754 10/19/21  0735 10/18/21  0823    137 138   POTASSIUM 4.1 4.0 3.9   CHLORIDE 107 106 105   CO2 28 26 25   BUN 25 25 23   CR 0.99 1.21* 1.11*   ANIONGAP 4 5 8   BOGDAN 8.2* 7.9* 8.1*   GLC 96 93 108*     Most Recent 2 LFT's:No lab results found.  Most Recent INR's and Anticoagulation Dosing History:  Anticoagulation Dose History    There is no flowsheet data to display.       Most Recent 3 Troponin's:No lab results found.  Most Recent Cholesterol Panel:No lab results found.  Most Recent 6 Bacteria Isolates From Any Culture (See EPIC Reports for Culture Details):No lab results found.  Most Recent TSH, T4 and A1c Labs:No lab results found.  Results for orders placed or performed during the hospital encounter of 10/16/21   XR Chest 1 View    Narrative    EXAM: XR CHEST 1 VIEW  LOCATION: [facility]  DATE/TIME: 10/16/2021, 9:18 AM    INDICATION: Fall, leg injury, preop.  COMPARISON: None.    FINDINGS: Patient is mildly rotated right anterior oblique. Skinfold overlies the right apex. The cardiomediastinal silhouette and pulmonary vasculature are within normal limits. Aortic calcification. Mild  bibasilar coarse interstitial opacities. No   pleural effusion or pneumothorax. Cholecystectomy. Diffuse osteopenia.      Impression    IMPRESSION: Mild bibasilar coarse interstitial opacities are favored to represent scarring/fibrosis, although pneumonitis is difficult to exclude.     XR Pelvis w Hip Right 1 View    Narrative    EXAM: XR PELVIS AND HIP RIGHT 1 VIEW  LOCATION: [facility]  DATE/TIME: 10/16/2021, 9:19 AM    INDICATION: Right-sided hip pain.  COMPARISON: None.      Impression    IMPRESSION:  1.  Comminuted intertrochanteric fracture of the right femur, with impaction and moderate varus angulation.  2.  Old healed fracture of the right inferior pubic ramus.  3.  No joint malalignment.  4.  Degenerative changes in the lower lumbar spine and sacroiliac joints.  5.  Bone demineralization.     XR Surgery AARON Fluoro L/T 5 Min w Stills    Narrative    EXAM: XR SURGERY C-ARM FLUORO LESS THAN 5 MIN WITH STILLS  LOCATION: [facility]  DATE/TIME: 10/17/2021, 11:35 AM    INDICATION: ORIF right hip.  COMPARISON: Pelvis x-ray dated 10/16/2021.  TECHNIQUE: Exam performed by surgeon.    FLUOROSCOPIC TIME: 1.3 minutes  NUMBER OF IMAGES: 5.      Impression    IMPRESSION:  1.  Intraoperative fluoroscopic assistance was provided with images of the right hip and femur saved and submitted for review. These demonstrate placement of a femoral intramedullary oxana and dynamic hip screw for intertrochanteric femur fracture.   Probable phleboliths overlie the right pelvis.

## 2021-10-20 NOTE — PLAN OF CARE
Pt has met all her goals.  Changed pt and washed her up.  Changed pt's attends, sent pt's cellphone and  with pt.  Pt has her glasses on and her neck pad around her neck.  Pt was taken to [facility] [place] via .

## 2021-10-20 NOTE — PLAN OF CARE
POD 2 R hip fracture s/p cephalomedulary nailing, A/O x4 VSS on room air, denies pain. Ax2 per gait belt and walker, regular diet, has pure wick, pending phosphorous recheck and  discharge to TCU today

## 2021-10-21 LAB
ATRIAL RATE - MUSE: 74 BPM
DIASTOLIC BLOOD PRESSURE - MUSE: NORMAL MMHG
INTERPRETATION ECG - MUSE: NORMAL
P AXIS - MUSE: 53 DEGREES
PR INTERVAL - MUSE: 158 MS
QRS DURATION - MUSE: 80 MS
QT - MUSE: 410 MS
QTC - MUSE: 455 MS
R AXIS - MUSE: 48 DEGREES
SYSTOLIC BLOOD PRESSURE - MUSE: NORMAL MMHG
T AXIS - MUSE: 47 DEGREES
VENTRICULAR RATE- MUSE: 74 BPM

## 2021-10-21 PROCEDURE — 36415 COLL VENOUS BLD VENIPUNCTURE: CPT | Performed by: NURSE PRACTITIONER

## 2021-10-21 PROCEDURE — P9603 ONE-WAY ALLOW PRORATED MILES: HCPCS | Performed by: NURSE PRACTITIONER

## 2021-10-21 PROCEDURE — 86481 TB AG RESPONSE T-CELL SUSP: CPT | Performed by: NURSE PRACTITIONER

## 2021-10-21 NOTE — PROGRESS NOTES
[facility]  INITIAL VISIT NOTE  October 22, 2021    PRIMARY CARE PROVIDER AND CLINIC:  Khadra Hernandez [address]  / [facility]     CHIEF COMPLAINT:  Hospital follow-up/Initial visit    HPI:    Katrina Portillo is a 89 year old  (6/12/1929) female who [facility] [place] [facility] for an initial visit.     Medical history is notable for hypertension, dyslipidemia, CKD stage III, hypothyroidism, GERD, anxiety, osteoarthritis, osteoporosis, and MONCHO.    Summary of hospital course:  Patient was hospitalized at [facility] from October 16 through October 20, 2021 for right hip fracture after fall.  EKG showed normal sinus rhythm and possible anterior infarct, age undetermined.  Chest x-ray demonstrated mild bibasilar coarse interstitial opacities suggestive of scarring/fibrosis.  Right hip/pelvis x-ray was remarkable for comminuted intertrochanteric fracture of the right femur with impaction and moderate varus angulation as well as old healed fracture of the right inferior pubic ramus.  Patient was evaluated by orthopedic service and underwent right hip cephalomedullary nail fixation on October 17, 2021.  EBL was 40 mL.  Postop hemoglobin dropped to 8.  She was started on aspirin for DVT prophylaxis.  TCU was recommended per therapies.    Patient is admitted to this facility for medical management, nursing care, and rehab.     Of note, history was obtained from patient, facility RN, and extensive review of the chart necessitated by complex hospitalization.    Today's visit:  Patient was seen in her room, while in bed.  She appears frail and weak but in no acute distress.  Her right hip surgical pain is fairly controlled at rest however it becomes severe with weightbearing or ambulation.  She reports that she had a bowel movement yesterday.  She denies fever, chills, chest pain, palpitation, dyspnea, nausea, vomiting, abdominal  pain, or urinary symptoms.      CODE STATUS:   CPR/Full code     PAST MEDICAL HISTORY:   Hypertension  Dyslipidemia  CKD stage IIIa, baseline creatinine 1-1.2  Hypothyroidism  GERD  Remote history of asthma  Colonic adenoma  Anxiety  Osteoarthritis  Osteoporosis  MONCHO, intolerant of CPAP  Fall in October 2021 resulting in right hip intertrochanteric fracture, s/p cephalomedullary nail on October 17, 2021    PAST SURGICAL HISTORY:   Past Surgical History:   Procedure Laterality Date     OPEN REDUCTION INTERNAL FIXATION HIP NAILING Right 10/17/2021    Procedure: INTERNAL FIXATION, FRACTURE, TROCHANTERIC, HIP, NAILING;  Surgeon: Benjy Tirado MD;  Location:  OR       FAMILY HISTORY:   Family history is significant for cancer of jaw in her father, stroke in her mother, and sudden death in one of her brothers in his mid 60's, possible MI..    SOCIAL HISTORY:  Patient has a 5-pack-year history of smoking from 1950 through 1955.    Social History     Tobacco Use     Smoking status: Never Smoker     Smokeless tobacco: Never Used   Substance Use Topics     Alcohol use: Never       MEDICATIONS:  Current Outpatient Medications   Medication Sig Dispense Refill     acetaminophen (TYLENOL) 325 MG tablet Take 2 tablets (650 mg) by mouth every 4 hours as needed for other (For optimal non-opioid multimodal pain management to improve pain control.) 20 tablet 0     alendronate (FOSAMAX) 70 MG tablet Take 70 mg by mouth every 7 days On Mondays       aspirin (ASA) 81 MG EC tablet Take 1 tablet (81 mg) by mouth 2 times daily 60 tablet 0     calcium carbonate (TUMS) 500 MG chewable tablet Take 1 tablet (500 mg) by mouth 3 times daily as needed for heartburn       CALCIUM PO Take 1 tablet by mouth daily       famotidine (PEPCID) 10 MG tablet Take 1 tablet (10 mg) by mouth 2 times daily for 5 days       ferrous sulfate (FEROSUL) 325 (65 Fe) MG tablet Take 1 tablet (325 mg) by mouth every other day       levothyroxine (SYNTHROID/LEVOTHROID)  "88 MCG tablet Take 88 mcg by mouth daily       [START ON 10/27/2021] losartan (COZAAR) 100 MG tablet Take 1 tablet (100 mg) by mouth daily       metoprolol succinate ER (TOPROL-XL) 25 MG 24 hr tablet Take 25 mg by mouth daily       nitroGLYcerin (NITROSTAT) 0.4 MG sublingual tablet Place 0.4 mg under the tongue every 5 minutes as needed for chest pain For chest pain place 1 tablet under the tongue every 5 minutes for 3 doses. If symptoms persist 5 minutes after 1st dose call 911.       senna-docusate (SENOKOT-S/PERICOLACE) 8.6-50 MG tablet Take 1 tablet by mouth 2 times daily as needed for constipation 20 tablet      sertraline (ZOLOFT) 25 MG tablet Take 12.5 mg by mouth daily       simvastatin (ZOCOR) 40 MG tablet Take 40 mg by mouth At Bedtime       traMADol (ULTRAM) 50 MG tablet Take 0.5-1 tablets (25-50 mg) by mouth every 6 hours as needed for moderate pain or severe pain 10 tablet 0     Vitamin D, Cholecalciferol, 25 MCG (1000 UT) TABS Take 1 tablet by mouth daily         ALLERGIES:  Allergies   Allergen Reactions     Procaine      Other reaction(s): Tachycardia     Tetanus Toxoids      Latex      Possible allergy historically per patient     Tetanus Antitoxin      Other reaction(s): Edema  Arm doubled in size       ROS:  10 point ROS were negative other than the symptoms noted above in the HPI.    PHYSICAL EXAM:  Vital signs were reviewed in the chart.  Vital Signs: BP (!) 144/86   Pulse 97   Temp 97.6  F (36.4  C)   Resp 16   Ht 1.613 m (5' 3.5\")   Wt 64.7 kg (142 lb 9.6 oz)   SpO2 98%   BMI 24.86 kg/m    General: Weak and frail appearing but in no acute distress  HEENT: Normocephalic; oropharynx clear; conjunctival pallor  Cardiovascular: Normal S1, S2, RRR  Respiratory: Lungs clear to auscultation bilaterally  GI: Abdomen soft, non-tender, non-distended, +BS  Extremities: No LE edema  Neuro: CX II-XII grossly intact; ROM in all four extremities grossly intact  Psych: Alert and oriented x3; normal " affect  Skin: Left hip surgical wound is dressed and appears clean    LABORATORY/IMAGING DATA:    Most Recent 3 CBC's:Recent Labs   Lab Test 10/20/21  0754 10/19/21  0735 10/18/21  0823 10/17/21  0747 10/17/21  0747 10/16/21  0856 10/16/21  0856   WBC 11.1*  --   --   --  9.8  --  8.6   HGB 8.1* 8.0* 9.0*   < > 10.3*   < > 11.5*   *  --   --   --  102*  --  101*     --   --   --  223  --  242    < > = values in this interval not displayed.     Most Recent 3 BMP's:Recent Labs   Lab Test 10/20/21  0754 10/19/21  0735 10/18/21  0823    137 138   POTASSIUM 4.1 4.0 3.9   CHLORIDE 107 106 105   CO2 28 26 25   BUN 25 25 23   CR 0.99 1.21* 1.11*   ANIONGAP 4 5 8   BOGDAN 8.2* 7.9* 8.1*   GLC 96 93 108*     Most Recent TSH and T4:No lab results found.      ASSESSMENT/PLAN:  Mechanical fall, subsequent encounter,  Right hip intertrochanteric fracture, s/p cephalomedullary nail on October 17, 2021,  Age-related osteoporosis with current pathologic fracture,  Physical deconditioning.  Patient is hemodynamically stable.  Patient reports severe pain with ambulation.  Plan:  Continue pain management with PRN acetaminophen and tramadol  Continue DVT prophylaxis with aspirin 81 mg p.o. twice daily  Continue calcium and vitamin D supplements  Continue Fosamax 70 mg p.o. every Monday  Right LE WBAT with walker  Continue PT/OT evaluation and therapy  Follow-up with Dr. Tirado of [facility] on November 3, 2021 as a scheduled    Acute blood loss anemia.  Postop hemoglobin dropped to 8 on October 19.  Started on ferrous sulfate in the hospital.  Plan:  Continue ferrous sulfate 325 mg p.o. every other day  Recheck hemoglobin on October 26    Essential hypertension.  Blood pressure is controlled.  Plan:  Continue PTA losartan 100 mg p.o. daily and metoprolol ER 25 mg p.o. daily  Monitor BP    Dyslipidemia.  Plan:  Continue PTA simvastatin 40 mg p.o. at bedtime    CKD stage IIIa.  Baseline creatinine 1-1.2.  Last creatinine was  stable at 0.99 on October 20.  Plan:  Avoid NSAIDs and nephrotoxins  Recheck BMP on October 26    Hypothyroidism.  Last TSH was 0.99 on September 8, 2020.  Plan:  Continue PTA levothyroxine 88 mcg p.o. daily    GERD.  Started on famotidine and Tums in the hospital for indigestion.  Plan:  Continue famotidine 10 mg p.o. twice daily for 5 days through October 25 as ordered  Continue PRN Tums    Anxiety.  Plan:  Continue PTA Zoloft 12.5 mg p.o. daily    MONCHO, intolerant of CPAP.  Patient sleeps on her sides which seems helping.  Plan:  Monitor          Orders written by provider at facility:  CBC on October 26, DX: Anemia   BMP on October 26, DX: CKD          Electronically signed by:  Mariano Espana MD

## 2021-10-22 ENCOUNTER — TRANSITIONAL CARE UNIT VISIT (OUTPATIENT)
Dept: GERIATRICS | Facility: CLINIC | Age: 86
End: 2021-10-22
Payer: COMMERCIAL

## 2021-10-22 VITALS
TEMPERATURE: 97.6 F | BODY MASS INDEX: 24.34 KG/M2 | RESPIRATION RATE: 16 BRPM | DIASTOLIC BLOOD PRESSURE: 86 MMHG | HEIGHT: 64 IN | SYSTOLIC BLOOD PRESSURE: 144 MMHG | HEART RATE: 97 BPM | OXYGEN SATURATION: 98 % | WEIGHT: 142.6 LBS

## 2021-10-22 DIAGNOSIS — F41.9 ANXIETY: ICD-10-CM

## 2021-10-22 DIAGNOSIS — E78.5 DYSLIPIDEMIA: ICD-10-CM

## 2021-10-22 DIAGNOSIS — K21.9 GASTROESOPHAGEAL REFLUX DISEASE, UNSPECIFIED WHETHER ESOPHAGITIS PRESENT: ICD-10-CM

## 2021-10-22 DIAGNOSIS — D62 ACUTE BLOOD LOSS ANEMIA: ICD-10-CM

## 2021-10-22 DIAGNOSIS — E03.9 HYPOTHYROIDISM, UNSPECIFIED TYPE: ICD-10-CM

## 2021-10-22 DIAGNOSIS — G47.33 OSA (OBSTRUCTIVE SLEEP APNEA): ICD-10-CM

## 2021-10-22 DIAGNOSIS — R53.81 PHYSICAL DECONDITIONING: ICD-10-CM

## 2021-10-22 DIAGNOSIS — S72.001S CLOSED RIGHT HIP FRACTURE, SEQUELA: Primary | ICD-10-CM

## 2021-10-22 DIAGNOSIS — I10 ESSENTIAL HYPERTENSION: ICD-10-CM

## 2021-10-22 DIAGNOSIS — N18.31 STAGE 3A CHRONIC KIDNEY DISEASE (H): ICD-10-CM

## 2021-10-22 LAB
GAMMA INTERFERON BACKGROUND BLD IA-ACNC: 0.07 IU/ML
M TB IFN-G BLD-IMP: NEGATIVE
M TB IFN-G CD4+ BCKGRND COR BLD-ACNC: 5.51 IU/ML
MITOGEN IGNF BCKGRD COR BLD-ACNC: 0.01 IU/ML
MITOGEN IGNF BCKGRD COR BLD-ACNC: 0.02 IU/ML
QUANTIFERON MITOGEN: 5.58 IU/ML
QUANTIFERON NIL TUBE: 0.07 IU/ML
QUANTIFERON TB1 TUBE: 0.09 IU/ML
QUANTIFERON TB2 TUBE: 0.08

## 2021-10-22 PROCEDURE — 99305 1ST NF CARE MODERATE MDM 35: CPT | Performed by: INTERNAL MEDICINE

## 2021-10-22 ASSESSMENT — MIFFLIN-ST. JEOR: SCORE: 1033.89

## 2021-10-22 NOTE — LETTER
10/22/2021        RE: Katrina Portillo  [address]  [place]  [facility] [id number]        [facility]  INITIAL VISIT NOTE  October 22, 2021    PRIMARY CARE PROVIDER AND CLINIC:  Khadra Hernandez [address]  / JESUS [facility]AU     CHIEF COMPLAINT:  Hospital follow-up/Initial visit    HPI:    Katrina Portillo is a 89 year old  (6/12/1929) female who was seen at [facility]CU on October 22, 2021 for an initial visit.     Medical history is notable for hypertension, dyslipidemia, CKD stage III, hypothyroidism, GERD, anxiety, osteoarthritis, osteoporosis, and MONCHO.    Summary of hospital course:  Patient was hospitalized at [facility] from October 16 through October 20, 2021 for right hip fracture after fall.  EKG showed normal sinus rhythm and possible anterior infarct, age undetermined.  Chest x-ray demonstrated mild bibasilar coarse interstitial opacities suggestive of scarring/fibrosis.  Right hip/pelvis x-ray was remarkable for comminuted intertrochanteric fracture of the right femur with impaction and moderate varus angulation as well as old healed fracture of the right inferior pubic ramus.  Patient was evaluated by orthopedic service and underwent right hip cephalomedullary nail fixation on October 17, 2021.  EBL was 40 mL.  Postop hemoglobin dropped to 8.  She was started on aspirin for DVT prophylaxis.  TCU was recommended per therapies.    Patient is admitted to this facility for medical management, nursing care, and rehab.     Of note, history was obtained from patient, facility RN, and extensive review of the chart necessitated by complex hospitalization.    Today's visit:  Patient was seen in her room, while in bed.  She appears frail and weak but in no acute distress.  Her right hip surgical pain is fairly controlled at rest however it becomes severe with weightbearing or ambulation.  She reports that she had  a bowel movement yesterday.  She denies fever, chills, chest pain, palpitation, dyspnea, nausea, vomiting, abdominal pain, or urinary symptoms.      CODE STATUS:   CPR/Full code     PAST MEDICAL HISTORY:   Hypertension  Dyslipidemia  CKD stage IIIa, baseline creatinine 1-1.2  Hypothyroidism  GERD  Remote history of asthma  Colonic adenoma  Anxiety  Osteoarthritis  Osteoporosis  MONCHO, intolerant of CPAP  Fall in October 2021 resulting in right hip intertrochanteric fracture, s/p cephalomedullary nail on October 17, 2021    PAST SURGICAL HISTORY:   Past Surgical History:   Procedure Laterality Date     OPEN REDUCTION INTERNAL FIXATION HIP NAILING Right 10/17/2021    Procedure: INTERNAL FIXATION, FRACTURE, TROCHANTERIC, HIP, NAILING;  Surgeon: Benjy Tirado MD;  Location: [facility] OR       FAMILY HISTORY:   Family history is significant for cancer of jaw in her father, stroke in her mother, and sudden death in one of her brothers in his mid 60's, possible MI..    SOCIAL HISTORY:  Patient has a 5-pack-year history of smoking from 1950 through 1955.    Social History     Tobacco Use     Smoking status: Never Smoker     Smokeless tobacco: Never Used   Substance Use Topics     Alcohol use: Never       MEDICATIONS:  Current Outpatient Medications   Medication Sig Dispense Refill     acetaminophen (TYLENOL) 325 MG tablet Take 2 tablets (650 mg) by mouth every 4 hours as needed for other (For optimal non-opioid multimodal pain management to improve pain control.) 20 tablet 0     alendronate (FOSAMAX) 70 MG tablet Take 70 mg by mouth every 7 days On Mondays       aspirin (ASA) 81 MG EC tablet Take 1 tablet (81 mg) by mouth 2 times daily 60 tablet 0     calcium carbonate (TUMS) 500 MG chewable tablet Take 1 tablet (500 mg) by mouth 3 times daily as needed for heartburn       CALCIUM PO Take 1 tablet by mouth daily       famotidine (PEPCID) 10 MG tablet Take 1 tablet (10 mg) by mouth 2 times daily for 5 days       ferrous sulfate  "(FEROSUL) 325 (65 Fe) MG tablet Take 1 tablet (325 mg) by mouth every other day       levothyroxine (SYNTHROID/LEVOTHROID) 88 MCG tablet Take 88 mcg by mouth daily       [START ON 10/27/2021] losartan (COZAAR) 100 MG tablet Take 1 tablet (100 mg) by mouth daily       metoprolol succinate ER (TOPROL-XL) 25 MG 24 hr tablet Take 25 mg by mouth daily       nitroGLYcerin (NITROSTAT) 0.4 MG sublingual tablet Place 0.4 mg under the tongue every 5 minutes as needed for chest pain For chest pain place 1 tablet under the tongue every 5 minutes for 3 doses. If symptoms persist 5 minutes after 1st dose call 911.       senna-docusate (SENOKOT-S/PERICOLACE) 8.6-50 MG tablet Take 1 tablet by mouth 2 times daily as needed for constipation 20 tablet      sertraline (ZOLOFT) 25 MG tablet Take 12.5 mg by mouth daily       simvastatin (ZOCOR) 40 MG tablet Take 40 mg by mouth At Bedtime       traMADol (ULTRAM) 50 MG tablet Take 0.5-1 tablets (25-50 mg) by mouth every 6 hours as needed for moderate pain or severe pain 10 tablet 0     Vitamin D, Cholecalciferol, 25 MCG (1000 UT) TABS Take 1 tablet by mouth daily         ALLERGIES:  Allergies   Allergen Reactions     Procaine      Other reaction(s): Tachycardia     Tetanus Toxoids      Latex      Possible allergy historically per patient     Tetanus Antitoxin      Other reaction(s): Edema  Arm doubled in size       ROS:  10 point ROS were negative other than the symptoms noted above in the HPI.    PHYSICAL EXAM:  Vital signs were reviewed in the chart.  Vital Signs: BP (!) 144/86   Pulse 97   Temp 97.6  F (36.4  C)   Resp 16   Ht 1.613 m (5' 3.5\")   Wt 64.7 kg (142 lb 9.6 oz)   SpO2 98%   BMI 24.86 kg/m    General: Weak and frail appearing but in no acute distress  HEENT: Normocephalic; oropharynx clear; conjunctival pallor  Cardiovascular: Normal S1, S2, RRR  Respiratory: Lungs clear to auscultation bilaterally  GI: Abdomen soft, non-tender, non-distended, +BS  Extremities: No LE " edema  Neuro: CX II-XII grossly intact; ROM in all four extremities grossly intact  Psych: Alert and oriented x3; normal affect  Skin: Left hip surgical wound is dressed and appears clean    LABORATORY/IMAGING DATA:    Most Recent 3 CBC's:Recent Labs   Lab Test 10/20/21  0754 10/19/21  0735 10/18/21  0823 10/17/21  0747 10/17/21  0747 10/16/21  0856 10/16/21  0856   WBC 11.1*  --   --   --  9.8  --  8.6   HGB 8.1* 8.0* 9.0*   < > 10.3*   < > 11.5*   *  --   --   --  102*  --  101*     --   --   --  223  --  242    < > = values in this interval not displayed.     Most Recent 3 BMP's:Recent Labs   Lab Test 10/20/21  0754 10/19/21  0735 10/18/21  0823    137 138   POTASSIUM 4.1 4.0 3.9   CHLORIDE 107 106 105   CO2 28 26 25   BUN 25 25 23   CR 0.99 1.21* 1.11*   ANIONGAP 4 5 8   BOGDAN 8.2* 7.9* 8.1*   GLC 96 93 108*     Most Recent TSH and T4:No lab results found.      ASSESSMENT/PLAN:  Mechanical fall, subsequent encounter,  Right hip intertrochanteric fracture, s/p cephalomedullary nail on October 17, 2021,  Age-related osteoporosis with current pathologic fracture,  Physical deconditioning.  Patient is hemodynamically stable.  Patient reports severe pain with ambulation.  Plan:  Continue pain management with PRN acetaminophen and tramadol  Continue DVT prophylaxis with aspirin 81 mg p.o. twice daily  Continue calcium and vitamin D supplements  Continue Fosamax 70 mg p.o. every Monday  Right LE WBAT with walker  Continue PT/OT evaluation and therapy  Follow-up with Dr. Tirado of [facility] on November 3, 2021 as a scheduled    Acute blood loss anemia.  Postop hemoglobin dropped to 8 on October 19.  Started on ferrous sulfate in the hospital.  Plan:  Continue ferrous sulfate 325 mg p.o. every other day  Recheck hemoglobin on October 26    Essential hypertension.  Blood pressure is controlled.  Plan:  Continue PTA losartan 100 mg p.o. daily and metoprolol ER 25 mg p.o. daily  Monitor  BP    Dyslipidemia.  Plan:  Continue PTA simvastatin 40 mg p.o. at bedtime    CKD stage IIIa.  Baseline creatinine 1-1.2.  Last creatinine was stable at 0.99 on October 20.  Plan:  Avoid NSAIDs and nephrotoxins  Recheck BMP on October 26    Hypothyroidism.  Last TSH was 0.99 on September 8, 2020.  Plan:  Continue PTA levothyroxine 88 mcg p.o. daily    GERD.  Started on famotidine and Tums in the hospital for indigestion.  Plan:  Continue famotidine 10 mg p.o. twice daily for 5 days through October 25 as ordered  Continue PRN Tums    Anxiety.  Plan:  Continue PTA Zoloft 12.5 mg p.o. daily    MONCHO, intolerant of CPAP.  Patient sleeps on her sides which seems helping.  Plan:  Monitor          Orders written by provider at facility:  CBC on October 26, DX: Anemia   BMP on October 26, DX: CKD          Electronically signed by:  Mariano Espana MD                          Sincerely,        Mariano Espana MD

## 2021-10-25 ENCOUNTER — LAB REQUISITION (OUTPATIENT)
Dept: LAB | Facility: CLINIC | Age: 86
End: 2021-10-25

## 2021-10-25 DIAGNOSIS — N18.9 CHRONIC KIDNEY DISEASE, UNSPECIFIED: ICD-10-CM

## 2021-10-25 DIAGNOSIS — U07.1 COVID-19: ICD-10-CM

## 2021-10-25 DIAGNOSIS — D64.9 ANEMIA, UNSPECIFIED: ICD-10-CM

## 2021-10-25 PROCEDURE — U0005 INFEC AGEN DETEC AMPLI PROBE: HCPCS | Performed by: NURSE PRACTITIONER

## 2021-10-26 LAB
ANION GAP SERPL CALCULATED.3IONS-SCNC: 3 MMOL/L (ref 3–14)
BUN SERPL-MCNC: 24 MG/DL (ref 7–30)
CALCIUM SERPL-MCNC: 8.5 MG/DL (ref 8.5–10.1)
CHLORIDE BLD-SCNC: 104 MMOL/L (ref 94–109)
CO2 SERPL-SCNC: 29 MMOL/L (ref 20–32)
CREAT SERPL-MCNC: 0.93 MG/DL (ref 0.52–1.04)
ERYTHROCYTE [DISTWIDTH] IN BLOOD BY AUTOMATED COUNT: 12.3 % (ref 10–15)
GFR SERPL CREATININE-BSD FRML MDRD: 54 ML/MIN/1.73M2
GLUCOSE BLD-MCNC: 91 MG/DL (ref 70–99)
HCT VFR BLD AUTO: 26.7 % (ref 35–47)
HGB BLD-MCNC: 8.3 G/DL (ref 11.7–15.7)
MCH RBC QN AUTO: 32.8 PG (ref 26.5–33)
MCHC RBC AUTO-ENTMCNC: 31.1 G/DL (ref 31.5–36.5)
MCV RBC AUTO: 106 FL (ref 78–100)
PLATELET # BLD AUTO: 530 10E3/UL (ref 150–450)
POTASSIUM BLD-SCNC: 3.9 MMOL/L (ref 3.4–5.3)
RBC # BLD AUTO: 2.53 10E6/UL (ref 3.8–5.2)
SARS-COV-2 RNA RESP QL NAA+PROBE: NEGATIVE
SODIUM SERPL-SCNC: 136 MMOL/L (ref 133–144)
WBC # BLD AUTO: 10.1 10E3/UL (ref 4–11)

## 2021-10-26 PROCEDURE — 85014 HEMATOCRIT: CPT | Performed by: NURSE PRACTITIONER

## 2021-10-26 PROCEDURE — 36415 COLL VENOUS BLD VENIPUNCTURE: CPT | Performed by: NURSE PRACTITIONER

## 2021-10-26 PROCEDURE — 80048 BASIC METABOLIC PNL TOTAL CA: CPT | Performed by: NURSE PRACTITIONER

## 2021-10-27 VITALS
BODY MASS INDEX: 24.52 KG/M2 | TEMPERATURE: 97.9 F | RESPIRATION RATE: 18 BRPM | WEIGHT: 140.6 LBS | OXYGEN SATURATION: 96 % | DIASTOLIC BLOOD PRESSURE: 69 MMHG | SYSTOLIC BLOOD PRESSURE: 110 MMHG | HEART RATE: 87 BPM

## 2021-10-27 NOTE — PROGRESS NOTES
[facility]    Chief Complaint   Patient presents with     RECHECK     HPI:  Katrina Portillo is a 89 year old  (6/12/1929), who is being seen today for an episodic care visit at: [facility] (TCU) [[id number]].     Per recent TCU provider progress notes:  89 year old female PMH hypertension, dyslipidemia, CKD stage III, hypothyroidism, GERD, anxiety, osteoarthritis, osteoporosis, and MONCHO hospitalized with right hip fracture after fall.  S/P right hip cephalomedullary nail fixation. Postop hemoglobin dropped to 8.  She was started on aspirin for DVT prophylaxis.  TCU was recommended per therapies.    Today's concern is:   Patient seen for episodic TCU visit. Reports doing well. No headaches, dizziness, chest pain, dyspnea, bowel or bladder issues. Weight down 2 lbs since admission. BP range 105-119/66-70 and sats 98% room air. Reviewed code status - full code confirmed.     Allergies, and PMH/PSH reviewed in [facility] today.  REVIEW OF SYSTEMS:  10 point ROS of systems including Constitutional, Eyes, Respiratory, Cardiovascular, Gastroenterology, Genitourinary, Integumentary, Musculoskeletal, Psychiatric were all negative except for pertinent positives noted in my HPI.    Objective:   /69   Pulse 87   Temp 97.9  F (36.6  C)   Resp 18   Wt 63.8 kg (140 lb 9.6 oz)   SpO2 96%   BMI 24.52 kg/m    Exam is limited due to COVID-19 precautions  GENERAL APPEARANCE:  Alert, in no distress, cooperative  ENT:  Mouth normal, moist mucous membranes, normal hearing acuity  EYES:  Conjunctiva and lids normal  RESP:  no respiratory distress, on room air and LSC  CV: HRR, no LE edema  NEURO:   No facial asymmetry, speech clear  PSYCH:  oriented X 3, affect and mood normal     Most Recent 3 CBC's:  Recent Labs   Lab Test 10/26/21  0600 10/20/21  0754 10/19/21  0735 10/18/21  0823 10/17/21  0747   WBC 10.1 11.1*  --   --  9.8   HGB 8.3* 8.1* 8.0*   < > 10.3*   * 103*  --   --  102*   * 259  --    --  223    < > = values in this interval not displayed.     Most Recent 3 BMP's:  Recent Labs   Lab Test 10/26/21  0600 10/20/21  0754 10/19/21  0735    139 137   POTASSIUM 3.9 4.1 4.0   CHLORIDE 104 107 106   CO2 29 28 26   BUN 24 25 25   CR 0.93 0.99 1.21*   ANIONGAP 3 4 5   BOGDAN 8.5 8.2* 7.9*   GLC 91 96 93       Assessment/Plan:  Right hip intertrochanteric fracture, s/p cephalomedullary nail  Age-related osteoporosis with current pathologic fracture  Physical deconditioning  Acute injury, surgery. Continue PRN acetaminophen and tramadol and DVT prophylaxis with aspirin 81 mg p.o. twice daily as well as calcium and vitamin D supplements and Fosamax 70 mg p.o. every Monday. Therapies as ordered and ortho f/u as planned.     Acute blood loss anemia  Acute with surgery, started iron daily. Hgb stable - check one week.     Essential hypertension  Blood pressure is controlled. Continue PTA losartan 100 mg p.o. daily and metoprolol ER 25 mg p.o. daily. Monitor vs and review next visit.     Dyslipidemia  Continue PTA simvastatin 40 mg p.o. at bedtime    CKD stage IIIa  Baseline creatinine 1-1.2. Stable last check. Avoid nephrotoxins.     Hypothyroidism  Continue PTA levothyroxine 88 mcg p.o. daily    GERD  No symptoms. Continue PRN Tums    Anxiety  Stable. Continue PTA Zoloft 12.5 mg p.o. daily    MONCHO, intolerant of CPAP  Monitor    Orders:  1. Full code  2. HGB one week    Electronically signed by: JANET Koo CNP

## 2021-10-28 ENCOUNTER — TRANSITIONAL CARE UNIT VISIT (OUTPATIENT)
Dept: GERIATRICS | Facility: CLINIC | Age: 86
End: 2021-10-28
Payer: COMMERCIAL

## 2021-10-28 DIAGNOSIS — D62 ACUTE BLOOD LOSS ANEMIA: ICD-10-CM

## 2021-10-28 DIAGNOSIS — S72.001S CLOSED RIGHT HIP FRACTURE, SEQUELA: Primary | ICD-10-CM

## 2021-10-28 DIAGNOSIS — R53.81 PHYSICAL DECONDITIONING: ICD-10-CM

## 2021-10-28 DIAGNOSIS — M80.00XD AGE-RELATED OSTEOPOROSIS WITH CURRENT PATHOLOGICAL FRACTURE WITH ROUTINE HEALING, SUBSEQUENT ENCOUNTER: ICD-10-CM

## 2021-10-28 DIAGNOSIS — F41.9 ANXIETY: ICD-10-CM

## 2021-10-28 DIAGNOSIS — K21.9 GASTROESOPHAGEAL REFLUX DISEASE, UNSPECIFIED WHETHER ESOPHAGITIS PRESENT: ICD-10-CM

## 2021-10-28 DIAGNOSIS — I10 ESSENTIAL HYPERTENSION: ICD-10-CM

## 2021-10-28 DIAGNOSIS — G47.33 OSA (OBSTRUCTIVE SLEEP APNEA): ICD-10-CM

## 2021-10-28 DIAGNOSIS — E03.9 HYPOTHYROIDISM, UNSPECIFIED TYPE: ICD-10-CM

## 2021-10-28 DIAGNOSIS — N18.31 STAGE 3A CHRONIC KIDNEY DISEASE (H): ICD-10-CM

## 2021-10-28 DIAGNOSIS — E78.5 DYSLIPIDEMIA: ICD-10-CM

## 2021-10-28 PROCEDURE — 99309 SBSQ NF CARE MODERATE MDM 30: CPT | Performed by: NURSE PRACTITIONER

## 2021-10-29 ENCOUNTER — LAB REQUISITION (OUTPATIENT)
Dept: LAB | Facility: CLINIC | Age: 86
End: 2021-10-29

## 2021-10-29 DIAGNOSIS — Z00.01 ENCOUNTER FOR GENERAL ADULT MEDICAL EXAMINATION WITH ABNORMAL FINDINGS: ICD-10-CM

## 2021-10-29 PROCEDURE — U0003 INFECTIOUS AGENT DETECTION BY NUCLEIC ACID (DNA OR RNA); SEVERE ACUTE RESPIRATORY SYNDROME CORONAVIRUS 2 (SARS-COV-2) (CORONAVIRUS DISEASE [COVID-19]), AMPLIFIED PROBE TECHNIQUE, MAKING USE OF HIGH THROUGHPUT TECHNOLOGIES AS DESCRIBED BY CMS-2020-01-R: HCPCS | Performed by: NURSE PRACTITIONER

## 2021-11-01 VITALS
DIASTOLIC BLOOD PRESSURE: 64 MMHG | OXYGEN SATURATION: 99 % | TEMPERATURE: 97.5 F | BODY MASS INDEX: 24.52 KG/M2 | WEIGHT: 140.6 LBS | SYSTOLIC BLOOD PRESSURE: 107 MMHG | HEART RATE: 87 BPM | RESPIRATION RATE: 18 BRPM

## 2021-11-01 LAB — SARS-COV-2 RNA RESP QL NAA+PROBE: NOT DETECTED

## 2021-11-02 ENCOUNTER — LAB REQUISITION (OUTPATIENT)
Dept: LAB | Facility: CLINIC | Age: 86
End: 2021-11-02

## 2021-11-02 ENCOUNTER — TRANSITIONAL CARE UNIT VISIT (OUTPATIENT)
Dept: GERIATRICS | Facility: CLINIC | Age: 86
End: 2021-11-02
Payer: COMMERCIAL

## 2021-11-02 DIAGNOSIS — N18.31 STAGE 3A CHRONIC KIDNEY DISEASE (H): ICD-10-CM

## 2021-11-02 DIAGNOSIS — K21.9 GASTROESOPHAGEAL REFLUX DISEASE, UNSPECIFIED WHETHER ESOPHAGITIS PRESENT: ICD-10-CM

## 2021-11-02 DIAGNOSIS — D64.9 ANEMIA, UNSPECIFIED: ICD-10-CM

## 2021-11-02 DIAGNOSIS — D62 ACUTE BLOOD LOSS ANEMIA: ICD-10-CM

## 2021-11-02 DIAGNOSIS — G47.33 OSA (OBSTRUCTIVE SLEEP APNEA): ICD-10-CM

## 2021-11-02 DIAGNOSIS — S72.001S CLOSED RIGHT HIP FRACTURE, SEQUELA: Primary | ICD-10-CM

## 2021-11-02 DIAGNOSIS — Z00.01 ENCOUNTER FOR GENERAL ADULT MEDICAL EXAMINATION WITH ABNORMAL FINDINGS: ICD-10-CM

## 2021-11-02 DIAGNOSIS — M80.00XD AGE-RELATED OSTEOPOROSIS WITH CURRENT PATHOLOGICAL FRACTURE WITH ROUTINE HEALING, SUBSEQUENT ENCOUNTER: ICD-10-CM

## 2021-11-02 DIAGNOSIS — I10 ESSENTIAL HYPERTENSION: ICD-10-CM

## 2021-11-02 DIAGNOSIS — R53.81 PHYSICAL DECONDITIONING: ICD-10-CM

## 2021-11-02 DIAGNOSIS — E03.9 HYPOTHYROIDISM, UNSPECIFIED TYPE: ICD-10-CM

## 2021-11-02 DIAGNOSIS — F41.9 ANXIETY: ICD-10-CM

## 2021-11-02 DIAGNOSIS — E78.5 DYSLIPIDEMIA: ICD-10-CM

## 2021-11-02 PROCEDURE — U0003 INFECTIOUS AGENT DETECTION BY NUCLEIC ACID (DNA OR RNA); SEVERE ACUTE RESPIRATORY SYNDROME CORONAVIRUS 2 (SARS-COV-2) (CORONAVIRUS DISEASE [COVID-19]), AMPLIFIED PROBE TECHNIQUE, MAKING USE OF HIGH THROUGHPUT TECHNOLOGIES AS DESCRIBED BY CMS-2020-01-R: HCPCS | Performed by: NURSE PRACTITIONER

## 2021-11-02 PROCEDURE — 99309 SBSQ NF CARE MODERATE MDM 30: CPT | Performed by: NURSE PRACTITIONER

## 2021-11-02 NOTE — PROGRESS NOTES
[facility]    Chief Complaint   Patient presents with     RECHECK     HPI:  Katrina Portillo is a 89 year old  (6/12/1929), who is being seen today for an episodic care visit at: [facility] ([facility]) [[id number]].     Per recent TCU provider progress notes:  89 year old female PMH hypertension, dyslipidemia, CKD stage III, hypothyroidism, GERD, anxiety, osteoarthritis, osteoporosis, and MONCHO hospitalized with right hip fracture after fall.  S/P right hip cephalomedullary nail fixation. Postop hemoglobin dropped to 8.  She was started on aspirin for DVT prophylaxis.  TCU was recommended per therapies.    Today's concern is:   Patient seen for episodic TCU visit. Reports doing well. No headaches, chest pain, dyspnea, bowel or bladder issues. Occasionally dizzy when up - BPs in the 90s systolic at times, will decrease BP meds. Weight down 2 lbs since admission. BP range /64-82 and sats 99% room air. Reports does not use pain meds - will stop PRN ultram. Walks 90 ft with 2WW and SBA.     Allergies, and PMH/PSH reviewed in [employer] today.    REVIEW OF SYSTEMS:  10 point ROS of systems including Constitutional, Eyes, Respiratory, Cardiovascular, Gastroenterology, Genitourinary, Integumentary, Musculoskeletal, Psychiatric were all negative except for pertinent positives noted in my HPI.    Objective:   /64   Pulse 87   Temp 97.5  F (36.4  C)   Resp 18   Wt 63.8 kg (140 lb 9.6 oz)   SpO2 99%   BMI 24.52 kg/m    Exam is limited due to COVID-19 precautions  GENERAL APPEARANCE:  Alert, in no distress, cooperative  ENT:  Mouth normal, moist mucous membranes, normal hearing acuity  EYES:  Conjunctiva and lids normal  RESP:  no respiratory distress, on room air and LSC  CV: HRR, trace right pedal and ankle edema  GI: soft, nontender abdomen with positive bowel sounds  NEURO:   No facial asymmetry, speech clear  PSYCH:  oriented X 3, affect and mood normal     Most Recent 3 CBC's:  Recent Labs   Lab  Test 10/26/21  0600 10/20/21  0754 10/19/21  0735 10/18/21  0823 10/17/21  0747   WBC 10.1 11.1*  --   --  9.8   HGB 8.3* 8.1* 8.0*   < > 10.3*   * 103*  --   --  102*   * 259  --   --  223    < > = values in this interval not displayed.     Most Recent 3 BMP's:  Recent Labs   Lab Test 10/26/21  0600 10/20/21  0754 10/19/21  0735    139 137   POTASSIUM 3.9 4.1 4.0   CHLORIDE 104 107 106   CO2 29 28 26   BUN 24 25 25   CR 0.93 0.99 1.21*   ANIONGAP 3 4 5   BOGDAN 8.5 8.2* 7.9*   GLC 91 96 93       Assessment/Plan:  Right hip intertrochanteric fracture, s/p cephalomedullary nail  Age-related osteoporosis with current pathologic fracture  Physical deconditioning  Acute injury, surgery. Continue PRN acetaminophen and stop tramadol. DVT prophylaxis with aspirin 81 mg p.o. twice daily as well as calcium and vitamin D supplements and Fosamax 70 mg p.o. every Monday. Therapies as ordered and ortho f/u as planned.     Acute blood loss anemia  Acute with surgery, started iron daily. Hgb stable - check this week.     Essential hypertension  Blood pressure is low at times. Decrease PTA losartan to 50 mg p.o. daily and continue metoprolol ER 25 mg p.o. daily. Monitor vs and review next visit.     Dyslipidemia  Continue PTA simvastatin 40 mg p.o. at bedtime    CKD stage IIIa  Baseline creatinine 1-1.2. Stable last check. Avoid nephrotoxins.     Hypothyroidism  Continue PTA levothyroxine 88 mcg p.o. daily    GERD  No symptoms. Continue PRN Tums    Anxiety  Stable. Continue PTA Zoloft 12.5 mg p.o. daily    MONCHO, intolerant of CPAP  Monitor    Orders:  1. Decrease losartan to 50 mg PO daily and hold for SBP <110  2. Stop ultram    Electronically signed by: JANET Koo CNP

## 2021-11-02 NOTE — NURSING NOTE
"[facility] GERIATRIC SERVICES    Chief Complaint   Patient presents with     RECHECK     HPI:  Katrina Portillo is a 89 year old  (6/12/1929), who is being seen today for an episodic care visit at: [facility] ([facility]) [[id number]]. PMH hypertension, dyslipidemia, CKD stage III, hypothyroidism, GERD, anxiety, osteoarthritis, osteoporosis, and MONCHO. Patient was hospitalized at [facility] from  10/16-20, October 16 through October 20, 2021 for right hip fracture after fall.  Right hip/pelvis x-ray was remarkable for comminuted intertrochanteric fracture of the right femur with impaction and moderate varus angulation as well as old healed fracture of the right inferior pubic ramus. She was evaluated by orthopedic service and underwent right hip cephalomedullary nail fixation on October 17, 2021.  EBL was 40 mL.  Postop hemoglobin dropped to 8.  She was started on aspirin for DVT prophylaxis.  TCU was recommended per therapies.    Today's concerns  Mrs. Portillo was awake during visit with no acute distress.She stated that she has been ambulating over 114 feet and would like to be able to meet a goal of 150 feet. To relieve feeling fatigued, she is requesting that her therapy be spaced out-Encouraged her to discuss this with PT. Her blood pressures continue to trend down (144/86<110/69<107/64). She did endorse experiencing intermittent dizziness  last week and attributes to be from her BP meds. We discussed about decreasing her Losartan does to 50mg daily, hold for SBP <110. Overall, she reports feeling well. Reports improved pain.No headaches, dizziness, chest pain, dyspnea.     Allergies, and PMH/PSH reviewed in [facility] today.  REVIEW OF SYSTEMS:  Currently rates her pain at \"5/10\". Denies chest pain, shortness of breath, fevers, chills, headache, nausea, vomiting, dysuria or bowel abnormalities.  Appetite is  normal.     Objective:   /64   Pulse 87   Temp 97.5  F (36.4  C)   Resp 18   Wt 63.8 kg " (140 lb 9.6 oz)   SpO2 99%   BMI 24.52 kg/m    GENERAL APPEARANCE:  Alert, in no distress  ENT:  Mouth and posterior oropharynx normal, moist mucous membranes  EYES:  EOM, conjunctivae, lids, pupils and irises normal  NECK:  No adenopathy,masses or thyromegaly  RESP:  respiratory effort and palpation of chest normal, lungs clear to auscultation , no respiratory distress  CV:  Palpation and auscultation of heart done , regular rate and rhythm, no murmur, rub, or gallop  CHEST (BREASTS):  Inspection of breasts are symmetrical and no discharge  ABDOMEN:  normal bowel sounds, soft, nontender, no hepatosplenomegaly or other masses  LYMPHATICS:  No adenopathy in neck   SKIN:  Inspection of skin and subcutaneous tissue baseline  PSYCH:  oriented X 3    Recent Results (from the past 240 hour(s))   COVID-19 Virus (Coronavirus) by PCR Nose    Collection Time: 10/25/21  3:30 PM    Specimen: Nose; Swab   Result Value Ref Range    SARS CoV2 PCR Negative Negative   CBC with platelets    Collection Time: 10/26/21  6:00 AM   Result Value Ref Range    WBC Count 10.1 4.0 - 11.0 10e3/uL    RBC Count 2.53 (L) 3.80 - 5.20 10e6/uL    Hemoglobin 8.3 (L) 11.7 - 15.7 g/dL    Hematocrit 26.7 (L) 35.0 - 47.0 %     (H) 78 - 100 fL    MCH 32.8 26.5 - 33.0 pg    MCHC 31.1 (L) 31.5 - 36.5 g/dL    RDW 12.3 10.0 - 15.0 %    Platelet Count 530 (H) 150 - 450 10e3/uL   Basic metabolic panel    Collection Time: 10/26/21  6:00 AM   Result Value Ref Range    Sodium 136 133 - 144 mmol/L    Potassium 3.9 3.4 - 5.3 mmol/L    Chloride 104 94 - 109 mmol/L    Carbon Dioxide (CO2) 29 20 - 32 mmol/L    Anion Gap 3 3 - 14 mmol/L    Urea Nitrogen 24 7 - 30 mg/dL    Creatinine 0.93 0.52 - 1.04 mg/dL    Calcium 8.5 8.5 - 10.1 mg/dL    Glucose 91 70 - 99 mg/dL    GFR Estimate 54 (L) >60 mL/min/1.73m2   COVID-19 Virus (Coronavirus) by PCR Nose    Collection Time: 10/29/21  9:59 AM    Specimen: Nose; Swab   Result Value Ref Range    COVID-19 Virus PCR - Result  Not Detected      Assessment/Plan:  Right hip intertrochanteric fracture, s/p cephalomedullary nail  Age-related osteoporosis with current pathologic fracture  Physical deconditioning  Acute injury, surgery. Continue PRN acetaminophen and tramadol and DVT prophylaxis with aspirin 81 mg p.o. twice daily as well as calcium and vitamin D supplements and Fosamax 70 mg p.o. every Monday.   Right LE WBAT with walker  Continue PT/OT evaluation and therapy  Follow-up with Dr. Tirado of [facility] on November 3, 2021 as  scheduled     Acute blood loss anemia.  Continue ferrous sulfate 325 mg p.o. every other day  Hgb stable - Recheck 11/4     Essential hypertension  Blood pressure is controlled. Continue PTA losartan 100 mg p.o. daily and metoprolol ER 25 mg p.o. daily. Monitor vs and review next visit.      Dyslipidemia  Continue PTA simvastatin 40 mg p.o. at bedtime     CKD stage IIIa  Baseline creatinine 1-1.2. Stable last check. Avoid nephrotoxins.      Hypothyroidism  Continue PTA levothyroxine 88 mcg p.o. daily     GERD  No symptoms. Continue PRN Tums     Anxiety  Stable. Continue PTA Zoloft 12.5 mg p.o. daily     MONCHO, intolerant of CPAP  Monitor    Orders:  1.Decrease Losartan does to 50mg daily, hold for SBP <110. Dx: Hypertension  2. D/C Ultram  Electronically signed by: Michele Velarde NP Student

## 2021-11-03 LAB
HGB BLD-MCNC: 8.7 G/DL (ref 11.7–15.7)
SARS-COV-2 RNA RESP QL NAA+PROBE: NEGATIVE

## 2021-11-03 PROCEDURE — P9603 ONE-WAY ALLOW PRORATED MILES: HCPCS | Performed by: NURSE PRACTITIONER

## 2021-11-03 PROCEDURE — 36415 COLL VENOUS BLD VENIPUNCTURE: CPT | Performed by: NURSE PRACTITIONER

## 2021-11-03 PROCEDURE — 85018 HEMOGLOBIN: CPT | Performed by: NURSE PRACTITIONER

## 2021-11-05 ENCOUNTER — LAB REQUISITION (OUTPATIENT)
Dept: LAB | Facility: CLINIC | Age: 86
End: 2021-11-05

## 2021-11-05 DIAGNOSIS — U07.1 COVID-19: ICD-10-CM

## 2021-11-05 PROCEDURE — U0003 INFECTIOUS AGENT DETECTION BY NUCLEIC ACID (DNA OR RNA); SEVERE ACUTE RESPIRATORY SYNDROME CORONAVIRUS 2 (SARS-COV-2) (CORONAVIRUS DISEASE [COVID-19]), AMPLIFIED PROBE TECHNIQUE, MAKING USE OF HIGH THROUGHPUT TECHNOLOGIES AS DESCRIBED BY CMS-2020-01-R: HCPCS | Performed by: NURSE PRACTITIONER

## 2021-11-07 LAB — SARS-COV-2 RNA RESP QL NAA+PROBE: NOT DETECTED

## 2021-11-08 VITALS
TEMPERATURE: 97.9 F | RESPIRATION RATE: 18 BRPM | WEIGHT: 138.4 LBS | BODY MASS INDEX: 24.13 KG/M2 | DIASTOLIC BLOOD PRESSURE: 81 MMHG | SYSTOLIC BLOOD PRESSURE: 99 MMHG | OXYGEN SATURATION: 98 % | HEART RATE: 80 BPM

## 2021-11-09 ENCOUNTER — LAB REQUISITION (OUTPATIENT)
Dept: LAB | Facility: CLINIC | Age: 86
End: 2021-11-09

## 2021-11-09 ENCOUNTER — DISCHARGE SUMMARY NURSING HOME (OUTPATIENT)
Dept: GERIATRICS | Facility: CLINIC | Age: 86
End: 2021-11-09
Payer: COMMERCIAL

## 2021-11-09 DIAGNOSIS — U07.1 COVID-19: ICD-10-CM

## 2021-11-09 DIAGNOSIS — I10 BENIGN ESSENTIAL HYPERTENSION: ICD-10-CM

## 2021-11-09 DIAGNOSIS — S72.001S CLOSED RIGHT HIP FRACTURE, SEQUELA: Primary | ICD-10-CM

## 2021-11-09 DIAGNOSIS — N18.31 STAGE 3A CHRONIC KIDNEY DISEASE (H): ICD-10-CM

## 2021-11-09 DIAGNOSIS — I10 ESSENTIAL HYPERTENSION: ICD-10-CM

## 2021-11-09 DIAGNOSIS — M80.00XD AGE-RELATED OSTEOPOROSIS WITH CURRENT PATHOLOGICAL FRACTURE WITH ROUTINE HEALING, SUBSEQUENT ENCOUNTER: ICD-10-CM

## 2021-11-09 DIAGNOSIS — D62 ACUTE BLOOD LOSS ANEMIA: ICD-10-CM

## 2021-11-09 DIAGNOSIS — F41.9 ANXIETY: ICD-10-CM

## 2021-11-09 DIAGNOSIS — R53.81 PHYSICAL DECONDITIONING: ICD-10-CM

## 2021-11-09 DIAGNOSIS — G47.33 OSA (OBSTRUCTIVE SLEEP APNEA): ICD-10-CM

## 2021-11-09 DIAGNOSIS — K21.9 GASTROESOPHAGEAL REFLUX DISEASE, UNSPECIFIED WHETHER ESOPHAGITIS PRESENT: ICD-10-CM

## 2021-11-09 DIAGNOSIS — E03.9 HYPOTHYROIDISM, UNSPECIFIED TYPE: ICD-10-CM

## 2021-11-09 DIAGNOSIS — E78.5 DYSLIPIDEMIA: ICD-10-CM

## 2021-11-09 PROCEDURE — U0005 INFEC AGEN DETEC AMPLI PROBE: HCPCS | Performed by: NURSE PRACTITIONER

## 2021-11-09 PROCEDURE — 99316 NF DSCHRG MGMT 30 MIN+: CPT | Performed by: NURSE PRACTITIONER

## 2021-11-09 RX ORDER — LOSARTAN POTASSIUM 50 MG/1
50 TABLET ORAL DAILY
Qty: 30 TABLET | Refills: 0 | Status: SHIPPED | OUTPATIENT
Start: 2021-11-09 | End: 2024-08-19

## 2021-11-09 NOTE — PROGRESS NOTES
[facility] GERIATRIC SERVICES DISCHARGE SUMMARY  PATIENT'S NAME: Katrina Portillo  YOB: 1929  MEDICAL RECORD NUMBER:  [id number]  Place of Service where encounter took place:  [facility] (TCU) [[id number]]    PRIMARY CARE PROVIDER AND CLINIC RESPONSIBLE AFTER TRANSFER:   Khadra Hernandez MD, HUMBERTO ELOISE TARASKYE [address]  / [place] [address]   Non-FMG Provider     Transferring providers: JANET Koo CNP, Dr. Malorie MD  Recent Hospitalization/ED:  [facility] stay 10/16/21 to 10/20/21.  Date of SNF Admission: 10/20/21  Date of SNF (anticipated) Discharge: 11/11/21  Discharged to: previous independent home  Cognitive Scores: SLUMS: 25/30  Physical Function: Ambulating 110 ft with 2WW  DME: No new DME needed    CODE STATUS/ADVANCE DIRECTIVES DISCUSSION:  Prior Full Code  ALLERGIES: Procaine, Tetanus toxoids, Latex, and Tetanus antitoxin    NURSING FACILITY COURSE   Medication Changes/Rationale:     Decreased losartan to 50 mg daily d/t HTN    Stopped PRN ultram due to no pain    Per recent TCU provider progress notes:  89 year old female PMH hypertension, dyslipidemia, CKD stage III, hypothyroidism, GERD, anxiety, osteoarthritis, osteoporosis, and MONCHO hospitalized with right hip fracture after fall.  S/P right hip cephalomedullary nail fixation. Postop hemoglobin dropped to 8.  She was started on aspirin for DVT prophylaxis.  TCU was recommended per therapies.    Patient seen for discharge visit. Reports doing well, ready to discharge home as planned on 11/11. No headaches, dizziness, chest pain, dyspnea, bowel or bladder concerns. Denies pain. Notes right leg distal incision still has staples in place - will remove today. Weight down 4 lbs since admission. BP range /74-84 and sats 98% room air. HR . Home care recommended and ordered below.     Summary of nursing facility stay:   Right hip intertrochanteric fracture, s/p cephalomedullary  nail  Age-related osteoporosis with current pathologic fracture  Physical deconditioning  Acute injury, surgery. Continue PRN acetaminophen and stopped tramadol. DVT prophylaxis with aspirin 81 mg p.o. twice daily as well as calcium and vitamin D supplements and Fosamax 70 mg p.o. every Monday. Ortho f/u as planned. Home with home care as ordered below.     Acute blood loss anemia  Acute with surgery, started iron daily. Hgb improving, recheck PRN.     Essential hypertension  Blood pressure was low at times. Decreased PTA losartan to 50 mg p.o. daily and continue metoprolol ER 25 mg p.o. daily. Monitor vs and review next PCP visit.     Dyslipidemia  Continue PTA simvastatin 40 mg p.o. at bedtime    CKD stage IIIa  Baseline creatinine 1-1.2. Stable last check. Avoid nephrotoxins.     Hypothyroidism  Continue PTA levothyroxine 88 mcg p.o. daily    GERD  No symptoms. Continue PRN Tums    Anxiety  Stable. Continue PTA Zoloft 12.5 mg p.o. daily    MONCHO, intolerant of CPAP  Monitor respiratory status.     Discharge Medications:  Current Outpatient Medications   Medication Sig Dispense Refill     acetaminophen (TYLENOL) 325 MG tablet Take 2 tablets (650 mg) by mouth every 4 hours as needed for other (For optimal non-opioid multimodal pain management to improve pain control.) 20 tablet 0     alendronate (FOSAMAX) 70 MG tablet Take 70 mg by mouth every 7 days On Mondays       aspirin (ASA) 81 MG EC tablet Take 1 tablet (81 mg) by mouth 2 times daily 60 tablet 0     calcium carbonate (TUMS) 500 MG chewable tablet Take 1 tablet (500 mg) by mouth 3 times daily as needed for heartburn       CALCIUM PO Take 1 tablet by mouth daily       ferrous sulfate (FEROSUL) 325 (65 Fe) MG tablet Take 1 tablet (325 mg) by mouth every other day       levothyroxine (SYNTHROID/LEVOTHROID) 88 MCG tablet Take 88 mcg by mouth daily       losartan (COZAAR) 50 MG tablet Take 1 tablet (50 mg) by mouth daily 30 tablet 0     metoprolol succinate ER  (TOPROL-XL) 25 MG 24 hr tablet Take 25 mg by mouth daily       nitroGLYcerin (NITROSTAT) 0.4 MG sublingual tablet Place 0.4 mg under the tongue every 5 minutes as needed for chest pain For chest pain place 1 tablet under the tongue every 5 minutes for 3 doses. If symptoms persist 5 minutes after 1st dose call 911.       senna-docusate (SENOKOT-S/PERICOLACE) 8.6-50 MG tablet Take 1 tablet by mouth 2 times daily as needed for constipation 20 tablet      sertraline (ZOLOFT) 25 MG tablet Take 12.5 mg by mouth daily       simvastatin (ZOCOR) 40 MG tablet Take 40 mg by mouth At Bedtime       Vitamin D, Cholecalciferol, 25 MCG (1000 UT) TABS Take 1 tablet by mouth daily          Controlled medications:   not applicable/none     Past Medical History: No past medical history on file.  Physical Exam:   Vitals: BP 99/81   Pulse 80   Temp 97.9  F (36.6  C)   Resp 18   Wt 62.8 kg (138 lb 6.4 oz)   SpO2 98%   BMI 24.13 kg/m    BMI= Body mass index is 24.13 kg/m .  Exam is limited due to COVID-19 precautions  GENERAL APPEARANCE:  Alert, in no distress, cooperative  ENT:  Mouth normal, moist mucous membranes, normal hearing acuity  EYES:  Conjunctiva and lids normal  RESP:  no respiratory distress, on room air  CV: no LE edema  SKIN: right leg distal incision with staples, no redness or drainage.   NEURO:   No facial asymmetry, speech clear  PSYCH:  oriented X 3, affect and mood normal     SNF labs:   Most Recent 3 CBC's:Recent Labs   Lab Test 11/03/21  1015 10/26/21  0600 10/20/21  0754 10/18/21  0823 10/17/21  0747   WBC  --  10.1 11.1*  --  9.8   HGB 8.7* 8.3* 8.1*   < > 10.3*   MCV  --  106* 103*  --  102*   PLT  --  530* 259  --  223    < > = values in this interval not displayed.     Most Recent 3 BMP's:Recent Labs   Lab Test 10/26/21  0600 10/20/21  0754 10/19/21  0735    139 137   POTASSIUM 3.9 4.1 4.0   CHLORIDE 104 107 106   CO2 29 28 26   BUN 24 25 25   CR 0.93 0.99 1.21*   ANIONGAP 3 4 5   BOGDAN 8.5 8.2* 7.9*    GLC 91 96 93       DISCHARGE PLAN:    Follow up labs: No labs orders/due    Medical Follow Up:      Follow up with primary care provider in 2-3 weeks  Follow up with specialist ortho as planned     Current [facility] scheduled appointments:   No future appointments.     Discharge Services: Home Care:  Occupational Therapy, Physical Therapy, Registered Nurse, Home Health Aide and [facility]    Discharge Instructions Verbalized to Patient at Discharge:     None    TOTAL DISCHARGE TIME:   Greater than 30 minutes  Electronically signed by:  JANET Koo CNP     Documentation of Face to Face and Certification for Home Health Services    I certify that services are/were furnished while this patient was under the care of a physician and that a physician or an allowed non-physician practitioner (NPP), had a face-to-face encounter that meets the physician face-to-face encounter requirements. The encounter was in whole, or in part, related to the primary reason for home health. The patient is confined to his/her home and needs intermittent skilled nursing, physical therapy, speech-language pathology, or the continued need for occupational therapy. A plan of care has been established by a physician and is periodically reviewed by a physician.  Date of Face-to-Face Encounter: 11/9/2021.    I certify that, based on my findings, the following services are medically necessary home health services: Nursing, Occupational Therapy, Physical Therapy, Social Work and HHA.    My clinical findings support the need for the above skilled services because: Requires assistance of another person or specialized equipment to access medical services because patient: Is unable to exit home safely on own due to: weakness, deconditioning.    Patient to re-establish plan of care with their PCP within 7-10 days after leaving the facility to reestablish care.  Medicare certified PECOS provider: JANET Koo CNP  Date:  November 9, 2021

## 2021-11-09 NOTE — NURSING NOTE
[facility] GERIATRIC SERVICES DISCHARGE SUMMARY  PATIENT'S NAME: Katrina Portillo  YOB: 1929  MEDICAL RECORD NUMBER:  [id number]  Place of Service where encounter took place:  [facility] (TCU) [[id number]]    PRIMARY CARE PROVIDER AND CLINIC RESPONSIBLE AFTER TRANSFER:   Khadra Hernandez MD, HUMBERTO MILLER [address]  / [place]   Non-FMG Provider   Transferring providers: Michele Velarde NP student  Recent Hospitalization/ED:  Hospital  [facility] stay from October 16 through October 20, 2021   Date of SNF Admission: November 20, 2021  Date of SNF (anticipated) Discharge: November 11, 2021  Discharged to: [facility]  Cognitive Scores: SLUMS: 25/30  Physical Function: Ambulating 2WW 110 ft with 2WW   DME: [facility]    CODE STATUS/ADVANCE DIRECTIVES DISCUSSION: CPR/Full code    ALLERGIES: Procaine, Tetanus toxoids, Latex, and Tetanus antitoxin    NURSING FACILITY COURSE   Lindsey Moura is a 89 year old female  (6/12/1929) who was admitted to [facility] TCU on October 22, 2021 for therapies. PMH includes hypertension, dyslipidemia, CKD stage III, hypothyroidism, GERD, anxiety, osteoarthritis, osteoporosis, and MONCHO hospitalized with right hip fracture after fall.Patient was hospitalized at [facility] from October 16 through October 20, 2021 for right hip fracture after fall.  EKG showed normal sinus rhythm and possible anterior infarct, age undetermined.  Chest x-ray demonstrated mild bibasilar coarse interstitial opacities suggestive of scarring/fibrosis.  Right hip/pelvis x-ray was remarkable for comminuted intertrochanteric fracture of the right femur with impaction and moderate varus angulation as well as old healed fracture of the right inferior pubic ramus.  Patient was evaluated by orthopedic service and underwent right hip cephalomedullary nail fixation on October 17, 2021.  EBL was 40 mL.  Postop hemoglobin dropped to 8.  She was  started on aspirin for DVT prophylaxis.  Current level as of 11/3 is 8.7.  During [facility] stay [facility], the patient  Complained of occasional dizziness especially when getting up. Her blood pressures ranged between /64-82 her Losartan dose was decreased to 50 mg PO daily and  Was to be held for SBP <110. her weight equally decreased to 2 lbs since admission. The patient occasionally utilized pain medications; therefore Ultram was discontinued. Able to ambulate roughly 90 ft with 2WW and SBA.  11/9/21: Patient was with  PT at the time of visit. She states that overall, she is feeling fairly well.SKIN: Right hip surgical wound is dressed and appears clean. Discussed having staples discharge today by nursing. She acknowledges having urine incontinence issues for many years and wears pads. Denies dysuria.Reports moving bowels regularly. Denies fever, chills, chest pain, palpitation, dyspnea, nausea, vomiting, abdominal pain.    Assessment/Plan  Right hip intertrochanteric fracture, s/p cephalomedullary nail  Age-related osteoporosis with current pathologic fracture  Physical deconditioning  Acute injury, surgery. Continue PRN acetaminophen and discontinue tramadol. DVT prophylaxis with aspirin 81 mg p.o. twice daily as well as calcium and vitamin D supplements and Fosamax 70 mg p.o. every Monday. Therapies as ordered. Followed-up with Dr. Tirado of [facility] on November 3, 2021    Acute blood loss anemia  Acute with surgery, started iron daily. Current Hgb as of 11/3 is 8.7.Recheck this week and F/u with primary care provider in 2- weeks     Essential hypertension  Blood pressure is low at times. Decrease PTA losartan to 50 mg p.o. daily and continue metoprolol ER 25 mg p.o. daily. Monitor vs       Dyslipidemia  Continue PTA simvastatin 40 mg p.o. at bedtime     CKD stage IIIa  Baseline creatinine 1-1.2. Stable last check. Avoid nephrotoxins.      Hypothyroidism  Continue PTA levothyroxine 88 mcg p.o. daily     GERD  No  symptoms. Continue PRN Tums     Anxiety  Stable. Continue PTA Zoloft 12.5 mg p.o. daily     MONCHO, intolerant of CPAP  Monitor     Orders:  1. Remove staples today from Right leg lateral incision   2. Discharge to home on on 11/11 with all current medications and orders   3.Home care: [facility] for PT/OT and RN  4.Follow up with PCP in 2-3 weeks   5.Follow up with ortho as scheduled    Discharge Medications:  Current Outpatient Medications   Medication Sig Dispense Refill     acetaminophen (TYLENOL) 325 MG tablet Take 2 tablets (650 mg) by mouth every 4 hours as needed for other (For optimal non-opioid multimodal pain management to improve pain control.) 20 tablet 0     alendronate (FOSAMAX) 70 MG tablet Take 70 mg by mouth every 7 days On Mondays       aspirin (ASA) 81 MG EC tablet Take 1 tablet (81 mg) by mouth 2 times daily 60 tablet 0     calcium carbonate (TUMS) 500 MG chewable tablet Take 1 tablet (500 mg) by mouth 3 times daily as needed for heartburn       CALCIUM PO Take 1 tablet by mouth daily       ferrous sulfate (FEROSUL) 325 (65 Fe) MG tablet Take 1 tablet (325 mg) by mouth every other day       levothyroxine (SYNTHROID/LEVOTHROID) 88 MCG tablet Take 88 mcg by mouth daily       losartan (COZAAR) 50 MG tablet Take 1 tablet (50 mg) by mouth daily 30 tablet 0     metoprolol succinate ER (TOPROL-XL) 25 MG 24 hr tablet Take 25 mg by mouth daily       nitroGLYcerin (NITROSTAT) 0.4 MG sublingual tablet Place 0.4 mg under the tongue every 5 minutes as needed for chest pain For chest pain place 1 tablet under the tongue every 5 minutes for 3 doses. If symptoms persist 5 minutes after 1st dose call 911.       senna-docusate (SENOKOT-S/PERICOLACE) 8.6-50 MG tablet Take 1 tablet by mouth 2 times daily as needed for constipation 20 tablet      sertraline (ZOLOFT) 25 MG tablet Take 12.5 mg by mouth daily       simvastatin (ZOCOR) 40 MG tablet Take 40 mg by mouth At Bedtime       Vitamin D, Cholecalciferol, 25 MCG (1000 UT)  TABS Take 1 tablet by mouth daily          Controlled medications:   not applicable/none     Past Medical History: No past medical history on file.  Physical Exam:   Vitals: BP 99/81   Pulse 80   Temp 97.9  F (36.6  C)   Resp 18   Wt 62.8 kg (138 lb 6.4 oz)   SpO2 98%   BMI 24.13 kg/m    BMI= Body mass index is 24.13 kg/m .  GENERAL APPEARANCE:  Alert, in no distress  ENT:  Mouth and posterior oropharynx normal, moist mucous membranes  EYES:  EOM, conjunctivae, lids, pupils and irises normal  NECK:  No adenopathy,masses or thyromegaly  RESP:  respiratory effort and palpation of chest normal, lungs clear to auscultation   CV:  Palpation and auscultation of heart done , regular rate and rhythm, no murmur, rub, or gallop  ABDOMEN:  normal bowel sounds, soft, nontender, no hepatosplenomegaly or other masses  LYMPHATICS:  No adenopathy in neck   SKIN: Right hip surgical wound is dressed and appears clean. Nursing order to d/c staples today. NEURO:CX II-XII grossly intact; ROM in all four extremities grossly intact  PSYCH:  oriented X 3     SNF labs:   Results for PADMAJA POST (MRN [id number]) as of 11/9/2021 13:56   Ref. Range 10/26/2021 06:00 10/29/2021 09:59 11/2/2021 08:29 11/3/2021 10:15 11/5/2021 11:30   Sodium Latest Ref Range: 133 - 144 mmol/L 136       Potassium Latest Ref Range: 3.4 - 5.3 mmol/L 3.9       Chloride Latest Ref Range: 94 - 109 mmol/L 104       Carbon Dioxide Latest Ref Range: 20 - 32 mmol/L 29       Urea Nitrogen Latest Ref Range: 7 - 30 mg/dL 24       Creatinine Latest Ref Range: 0.52 - 1.04 mg/dL 0.93       GFR Estimate Latest Ref Range: >60 mL/min/1.73m2 54 (L)       Calcium Latest Ref Range: 8.5 - 10.1 mg/dL 8.5       Anion Gap Latest Ref Range: 3 - 14 mmol/L 3       Glucose Latest Ref Range: 70 - 99 mg/dL 91       WBC Latest Ref Range: 4.0 - 11.0 10e3/uL 10.1       Hemoglobin Latest Ref Range: 11.7 - 15.7 g/dL 8.3 (L)   8.7 (L)    Hematocrit Latest Ref Range: 35.0 - 47.0 % 26.7  (L)       Platelet Count Latest Ref Range: 150 - 450 10e3/uL 530 (H)       RBC Count Latest Ref Range: 3.80 - 5.20 10e6/uL 2.53 (L)       MCV Latest Ref Range: 78 - 100 fL 106 (H)       MCH Latest Ref Range: 26.5 - 33.0 pg 32.8       MCHC Latest Ref Range: 31.5 - 36.5 g/dL 31.1 (L)       RDW Latest Ref Range: 10.0 - 15.0 % 12.3       SARS CoV2 PCR Latest Ref Range: Negative    Negative     COVID-19 Virus PCR - Result Unknown  Not Detected   Not Detected       DISCHARGE PLAN:    Follow up labs: No labs orders/due    Medical Follow Up:      Follow up with primary care provider in 2-3  weeks    Follow up with specialist ortho as planned     Current [place] scheduled appointments:   No future appointments.     Discharge Services: Home Care:  Occupational Therapy, Physical Therapy, Registered Nurse, and Home Health Aide    Discharge Instructions Verbalized to Patient at Discharge:   ? None        Documentation of Face to Face and Certification for Home Health Services     I certify that services are/were furnished while this patient was under the care of a physician and that a physician or an allowed non-physician practitioner (NPP), had a face-to-face encounter that meets the physician face-to-face encounter requirements. The encounter was in whole, or in part, related to the primary reason for home health. The patient is confined to his/her home and needs intermittent skilled nursing, physical therapy, speech-language pathology, or the continued need for occupational therapy. A plan of care has been established by a physician and is periodically reviewed by a physician.  Date of Face-to-Face Encounter: November 9, 2021     I certify that, based on my findings, the following services are medically necessary home health services: Nursing, Occupational Therapy and Physical Therapy.    My clinical findings support the need for the above skilled services because: Requires assistance of another person or specialized  equipment to access medical services because patient: Requires supervision of another for safe transfer.     Patient to re-establish plan of care with their PCP within 7-10 days after leaving the facility to reestablish care.  Medicare certified PECOS provider:   Date: November 9, 2021    TOTAL DISCHARGE TIME:   Less than or equal to 30 minutes  Electronically signed by:  Michele Velarde NP student    Home care Face to Face documentation done in [facility] attached to Home care orders for [facility].

## 2021-11-10 LAB — SARS-COV-2 RNA RESP QL NAA+PROBE: NEGATIVE

## 2022-12-16 ENCOUNTER — LAB REQUISITION (OUTPATIENT)
Dept: LAB | Facility: CLINIC | Age: 87
End: 2022-12-16
Payer: COMMERCIAL

## 2022-12-16 DIAGNOSIS — E78.5 HYPERLIPIDEMIA, UNSPECIFIED: ICD-10-CM

## 2022-12-16 DIAGNOSIS — D64.9 ANEMIA, UNSPECIFIED: ICD-10-CM

## 2022-12-16 DIAGNOSIS — F03.90 UNSPECIFIED DEMENTIA, UNSPECIFIED SEVERITY, WITHOUT BEHAVIORAL DISTURBANCE, PSYCHOTIC DISTURBANCE, MOOD DISTURBANCE, AND ANXIETY (H): ICD-10-CM

## 2022-12-16 DIAGNOSIS — I10 ESSENTIAL (PRIMARY) HYPERTENSION: ICD-10-CM

## 2022-12-19 LAB
ALBUMIN SERPL BCG-MCNC: 3.9 G/DL (ref 3.5–5.2)
ALP SERPL-CCNC: 122 U/L (ref 35–104)
ALT SERPL W P-5'-P-CCNC: 10 U/L (ref 10–35)
ANION GAP SERPL CALCULATED.3IONS-SCNC: 16 MMOL/L (ref 7–15)
AST SERPL W P-5'-P-CCNC: 26 U/L (ref 10–35)
BILIRUB SERPL-MCNC: 0.4 MG/DL
BUN SERPL-MCNC: 30.7 MG/DL (ref 8–23)
CALCIUM SERPL-MCNC: 9.8 MG/DL (ref 8.2–9.6)
CHLORIDE SERPL-SCNC: 102 MMOL/L (ref 98–107)
CHOLEST SERPL-MCNC: 208 MG/DL
CREAT SERPL-MCNC: 1.29 MG/DL (ref 0.51–0.95)
DEPRECATED HCO3 PLAS-SCNC: 23 MMOL/L (ref 22–29)
ERYTHROCYTE [DISTWIDTH] IN BLOOD BY AUTOMATED COUNT: 12.3 % (ref 10–15)
GFR SERPL CREATININE-BSD FRML MDRD: 39 ML/MIN/1.73M2
GLUCOSE SERPL-MCNC: 92 MG/DL (ref 70–99)
HCT VFR BLD AUTO: 40.6 % (ref 35–47)
HDLC SERPL-MCNC: 53 MG/DL
HGB BLD-MCNC: 12.7 G/DL (ref 11.7–15.7)
LDLC SERPL CALC-MCNC: 104 MG/DL
MCH RBC QN AUTO: 32.6 PG (ref 26.5–33)
MCHC RBC AUTO-ENTMCNC: 31.3 G/DL (ref 31.5–36.5)
MCV RBC AUTO: 104 FL (ref 78–100)
NONHDLC SERPL-MCNC: 155 MG/DL
PLATELET # BLD AUTO: 304 10E3/UL (ref 150–450)
POTASSIUM SERPL-SCNC: 4.9 MMOL/L (ref 3.4–5.3)
PROT SERPL-MCNC: 7.7 G/DL (ref 6.4–8.3)
RBC # BLD AUTO: 3.9 10E6/UL (ref 3.8–5.2)
SODIUM SERPL-SCNC: 141 MMOL/L (ref 136–145)
TRIGL SERPL-MCNC: 254 MG/DL
TSH SERPL DL<=0.005 MIU/L-ACNC: 2.12 UIU/ML (ref 0.3–4.2)
VIT B12 SERPL-MCNC: 2560 PG/ML (ref 232–1245)
WBC # BLD AUTO: 9.1 10E3/UL (ref 4–11)

## 2022-12-19 PROCEDURE — 80061 LIPID PANEL: CPT | Mod: ORL | Performed by: FAMILY MEDICINE

## 2022-12-19 PROCEDURE — 84443 ASSAY THYROID STIM HORMONE: CPT | Mod: ORL | Performed by: FAMILY MEDICINE

## 2022-12-19 PROCEDURE — 36415 COLL VENOUS BLD VENIPUNCTURE: CPT | Mod: ORL | Performed by: FAMILY MEDICINE

## 2022-12-19 PROCEDURE — 80053 COMPREHEN METABOLIC PANEL: CPT | Mod: ORL | Performed by: FAMILY MEDICINE

## 2022-12-19 PROCEDURE — 82607 VITAMIN B-12: CPT | Mod: ORL | Performed by: FAMILY MEDICINE

## 2022-12-19 PROCEDURE — 85027 COMPLETE CBC AUTOMATED: CPT | Mod: ORL | Performed by: FAMILY MEDICINE

## 2022-12-19 PROCEDURE — P9603 ONE-WAY ALLOW PRORATED MILES: HCPCS | Mod: ORL | Performed by: FAMILY MEDICINE

## 2023-03-09 ENCOUNTER — LAB REQUISITION (OUTPATIENT)
Dept: LAB | Facility: CLINIC | Age: 88
End: 2023-03-09
Payer: COMMERCIAL

## 2023-03-09 DIAGNOSIS — E03.9 HYPOTHYROIDISM, UNSPECIFIED: ICD-10-CM

## 2023-03-13 LAB
T4 FREE SERPL-MCNC: 1.58 NG/DL (ref 0.9–1.7)
TSH SERPL DL<=0.005 MIU/L-ACNC: 0.54 UIU/ML (ref 0.3–4.2)

## 2023-03-13 PROCEDURE — 84439 ASSAY OF FREE THYROXINE: CPT | Mod: ORL

## 2023-03-13 PROCEDURE — 36415 COLL VENOUS BLD VENIPUNCTURE: CPT | Mod: ORL

## 2023-03-13 PROCEDURE — P9603 ONE-WAY ALLOW PRORATED MILES: HCPCS | Mod: ORL

## 2023-03-13 PROCEDURE — 84443 ASSAY THYROID STIM HORMONE: CPT | Mod: ORL

## 2023-04-05 ENCOUNTER — LAB REQUISITION (OUTPATIENT)
Dept: LAB | Facility: CLINIC | Age: 88
End: 2023-04-05
Payer: COMMERCIAL

## 2023-04-05 DIAGNOSIS — E53.9 VITAMIN B DEFICIENCY, UNSPECIFIED: ICD-10-CM

## 2023-04-10 LAB — VIT B12 SERPL-MCNC: 859 PG/ML (ref 232–1245)

## 2023-04-10 PROCEDURE — 36415 COLL VENOUS BLD VENIPUNCTURE: CPT | Mod: ORL | Performed by: FAMILY MEDICINE

## 2023-04-10 PROCEDURE — P9603 ONE-WAY ALLOW PRORATED MILES: HCPCS | Mod: ORL | Performed by: FAMILY MEDICINE

## 2023-04-10 PROCEDURE — 82607 VITAMIN B-12: CPT | Mod: ORL | Performed by: FAMILY MEDICINE

## 2023-07-18 ENCOUNTER — LAB REQUISITION (OUTPATIENT)
Dept: LAB | Facility: CLINIC | Age: 88
End: 2023-07-18
Payer: COMMERCIAL

## 2023-07-18 DIAGNOSIS — N18.30 CHRONIC KIDNEY DISEASE, STAGE 3 UNSPECIFIED (H): ICD-10-CM

## 2023-07-18 DIAGNOSIS — I10 ESSENTIAL (PRIMARY) HYPERTENSION: ICD-10-CM

## 2023-07-18 DIAGNOSIS — E53.8 DEFICIENCY OF OTHER SPECIFIED B GROUP VITAMINS: ICD-10-CM

## 2023-07-18 DIAGNOSIS — E03.9 HYPOTHYROIDISM, UNSPECIFIED: ICD-10-CM

## 2023-07-18 DIAGNOSIS — D64.9 ANEMIA, UNSPECIFIED: ICD-10-CM

## 2023-07-24 LAB
ALBUMIN SERPL BCG-MCNC: 3.7 G/DL (ref 3.5–5.2)
ALP SERPL-CCNC: 89 U/L (ref 35–104)
ALT SERPL W P-5'-P-CCNC: 10 U/L (ref 0–50)
ANION GAP SERPL CALCULATED.3IONS-SCNC: 9 MMOL/L (ref 7–15)
AST SERPL W P-5'-P-CCNC: 26 U/L (ref 0–45)
BILIRUB SERPL-MCNC: 0.3 MG/DL
BUN SERPL-MCNC: 29.6 MG/DL (ref 8–23)
CALCIUM SERPL-MCNC: 9.4 MG/DL (ref 8.2–9.6)
CHLORIDE SERPL-SCNC: 103 MMOL/L (ref 98–107)
CREAT SERPL-MCNC: 1.2 MG/DL (ref 0.51–0.95)
DEPRECATED HCO3 PLAS-SCNC: 27 MMOL/L (ref 22–29)
ERYTHROCYTE [DISTWIDTH] IN BLOOD BY AUTOMATED COUNT: 13.2 % (ref 10–15)
GFR SERPL CREATININE-BSD FRML MDRD: 42 ML/MIN/1.73M2
GLUCOSE SERPL-MCNC: 97 MG/DL (ref 70–99)
HCT VFR BLD AUTO: 35.7 % (ref 35–47)
HGB BLD-MCNC: 11.3 G/DL (ref 11.7–15.7)
MCH RBC QN AUTO: 32.8 PG (ref 26.5–33)
MCHC RBC AUTO-ENTMCNC: 31.7 G/DL (ref 31.5–36.5)
MCV RBC AUTO: 104 FL (ref 78–100)
PLATELET # BLD AUTO: 285 10E3/UL (ref 150–450)
POTASSIUM SERPL-SCNC: 4.6 MMOL/L (ref 3.4–5.3)
PROT SERPL-MCNC: 6.8 G/DL (ref 6.4–8.3)
RBC # BLD AUTO: 3.44 10E6/UL (ref 3.8–5.2)
SODIUM SERPL-SCNC: 139 MMOL/L (ref 136–145)
T4 FREE SERPL-MCNC: 1.32 NG/DL (ref 0.9–1.7)
TSH SERPL DL<=0.005 MIU/L-ACNC: 2.85 UIU/ML (ref 0.3–4.2)
VIT B12 SERPL-MCNC: 722 PG/ML (ref 232–1245)
WBC # BLD AUTO: 9.3 10E3/UL (ref 4–11)

## 2023-07-24 PROCEDURE — P9603 ONE-WAY ALLOW PRORATED MILES: HCPCS | Mod: ORL

## 2023-07-24 PROCEDURE — 84439 ASSAY OF FREE THYROXINE: CPT | Mod: ORL

## 2023-07-24 PROCEDURE — 82607 VITAMIN B-12: CPT | Mod: ORL

## 2023-07-24 PROCEDURE — 85027 COMPLETE CBC AUTOMATED: CPT | Mod: ORL

## 2023-07-24 PROCEDURE — 84443 ASSAY THYROID STIM HORMONE: CPT | Mod: ORL

## 2023-07-24 PROCEDURE — 36415 COLL VENOUS BLD VENIPUNCTURE: CPT | Mod: ORL

## 2023-07-24 PROCEDURE — 82306 VITAMIN D 25 HYDROXY: CPT | Mod: ORL

## 2023-07-24 PROCEDURE — 80053 COMPREHEN METABOLIC PANEL: CPT | Mod: ORL

## 2023-07-25 LAB — DEPRECATED CALCIDIOL+CALCIFEROL SERPL-MC: 64 UG/L (ref 20–75)

## 2023-12-03 ENCOUNTER — LAB REQUISITION (OUTPATIENT)
Dept: LAB | Facility: CLINIC | Age: 88
End: 2023-12-03
Payer: COMMERCIAL

## 2023-12-03 DIAGNOSIS — I10 ESSENTIAL (PRIMARY) HYPERTENSION: ICD-10-CM

## 2023-12-10 ENCOUNTER — LAB REQUISITION (OUTPATIENT)
Dept: LAB | Facility: CLINIC | Age: 88
End: 2023-12-10
Payer: COMMERCIAL

## 2023-12-10 DIAGNOSIS — I10 ESSENTIAL (PRIMARY) HYPERTENSION: ICD-10-CM

## 2023-12-11 PROCEDURE — 80053 COMPREHEN METABOLIC PANEL: CPT | Mod: ORL

## 2023-12-11 PROCEDURE — P9603 ONE-WAY ALLOW PRORATED MILES: HCPCS | Mod: ORL

## 2023-12-11 PROCEDURE — 36415 COLL VENOUS BLD VENIPUNCTURE: CPT | Mod: ORL

## 2023-12-12 LAB
ALBUMIN SERPL BCG-MCNC: 3.9 G/DL (ref 3.5–5.2)
ALP SERPL-CCNC: 112 U/L (ref 40–150)
ALT SERPL W P-5'-P-CCNC: 11 U/L (ref 0–50)
ANION GAP SERPL CALCULATED.3IONS-SCNC: 7 MMOL/L (ref 7–15)
ANION GAP SERPL CALCULATED.3IONS-SCNC: 8 MMOL/L (ref 7–15)
AST SERPL W P-5'-P-CCNC: 21 U/L (ref 0–45)
BILIRUB SERPL-MCNC: 0.3 MG/DL
BUN SERPL-MCNC: 26.4 MG/DL (ref 8–23)
BUN SERPL-MCNC: 26.7 MG/DL (ref 8–23)
CALCIUM SERPL-MCNC: 9.2 MG/DL (ref 8.2–9.6)
CALCIUM SERPL-MCNC: 9.3 MG/DL (ref 8.2–9.6)
CHLORIDE SERPL-SCNC: 100 MMOL/L (ref 98–107)
CHLORIDE SERPL-SCNC: 99 MMOL/L (ref 98–107)
CREAT SERPL-MCNC: 1.13 MG/DL (ref 0.51–0.95)
CREAT SERPL-MCNC: 1.13 MG/DL (ref 0.51–0.95)
DEPRECATED HCO3 PLAS-SCNC: 30 MMOL/L (ref 22–29)
DEPRECATED HCO3 PLAS-SCNC: 30 MMOL/L (ref 22–29)
EGFRCR SERPLBLD CKD-EPI 2021: 45 ML/MIN/1.73M2
EGFRCR SERPLBLD CKD-EPI 2021: 45 ML/MIN/1.73M2
GLUCOSE SERPL-MCNC: 70 MG/DL (ref 70–99)
GLUCOSE SERPL-MCNC: 72 MG/DL (ref 70–99)
POTASSIUM SERPL-SCNC: 5 MMOL/L (ref 3.4–5.3)
POTASSIUM SERPL-SCNC: 5.3 MMOL/L (ref 3.4–5.3)
PROT SERPL-MCNC: 7.2 G/DL (ref 6.4–8.3)
SODIUM SERPL-SCNC: 137 MMOL/L (ref 135–145)
SODIUM SERPL-SCNC: 137 MMOL/L (ref 135–145)

## 2024-03-22 ENCOUNTER — LAB REQUISITION (OUTPATIENT)
Dept: LAB | Facility: CLINIC | Age: 89
End: 2024-03-22
Payer: COMMERCIAL

## 2024-03-22 DIAGNOSIS — R53.1 WEAKNESS: ICD-10-CM

## 2024-03-25 LAB
ERYTHROCYTE [DISTWIDTH] IN BLOOD BY AUTOMATED COUNT: 13.5 % (ref 10–15)
HCT VFR BLD AUTO: 37.4 % (ref 35–47)
HGB BLD-MCNC: 11.9 G/DL (ref 11.7–15.7)
MCH RBC QN AUTO: 32.7 PG (ref 26.5–33)
MCHC RBC AUTO-ENTMCNC: 31.8 G/DL (ref 31.5–36.5)
MCV RBC AUTO: 103 FL (ref 78–100)
PLATELET # BLD AUTO: 319 10E3/UL (ref 150–450)
RBC # BLD AUTO: 3.64 10E6/UL (ref 3.8–5.2)
WBC # BLD AUTO: 11.2 10E3/UL (ref 4–11)

## 2024-03-25 PROCEDURE — P9603 ONE-WAY ALLOW PRORATED MILES: HCPCS | Mod: ORL | Performed by: NURSE PRACTITIONER

## 2024-03-25 PROCEDURE — 85027 COMPLETE CBC AUTOMATED: CPT | Mod: ORL | Performed by: NURSE PRACTITIONER

## 2024-03-25 PROCEDURE — 36415 COLL VENOUS BLD VENIPUNCTURE: CPT | Mod: ORL | Performed by: NURSE PRACTITIONER

## 2024-03-25 PROCEDURE — 80048 BASIC METABOLIC PNL TOTAL CA: CPT | Mod: ORL | Performed by: NURSE PRACTITIONER

## 2024-03-25 PROCEDURE — 84443 ASSAY THYROID STIM HORMONE: CPT | Mod: ORL | Performed by: NURSE PRACTITIONER

## 2024-03-26 LAB
ANION GAP SERPL CALCULATED.3IONS-SCNC: 11 MMOL/L (ref 7–15)
BUN SERPL-MCNC: 30 MG/DL (ref 8–23)
CALCIUM SERPL-MCNC: 9.4 MG/DL (ref 8.2–9.6)
CHLORIDE SERPL-SCNC: 99 MMOL/L (ref 98–107)
CREAT SERPL-MCNC: 1.28 MG/DL (ref 0.51–0.95)
DEPRECATED HCO3 PLAS-SCNC: 27 MMOL/L (ref 22–29)
EGFRCR SERPLBLD CKD-EPI 2021: 39 ML/MIN/1.73M2
GLUCOSE SERPL-MCNC: 72 MG/DL (ref 70–99)
POTASSIUM SERPL-SCNC: 4.6 MMOL/L (ref 3.4–5.3)
SODIUM SERPL-SCNC: 137 MMOL/L (ref 135–145)
TSH SERPL DL<=0.005 MIU/L-ACNC: 17.1 UIU/ML (ref 0.3–4.2)

## 2024-04-06 ENCOUNTER — LAB REQUISITION (OUTPATIENT)
Dept: LAB | Facility: CLINIC | Age: 89
End: 2024-04-06
Payer: COMMERCIAL

## 2024-04-06 DIAGNOSIS — E03.9 HYPOTHYROIDISM, UNSPECIFIED: ICD-10-CM

## 2024-04-08 LAB
T3 SERPL-MCNC: 55 NG/DL (ref 85–202)
TSH SERPL DL<=0.005 MIU/L-ACNC: 5.5 UIU/ML (ref 0.3–4.2)

## 2024-04-08 PROCEDURE — 36415 COLL VENOUS BLD VENIPUNCTURE: CPT | Mod: ORL | Performed by: NURSE PRACTITIONER

## 2024-04-08 PROCEDURE — 84480 ASSAY TRIIODOTHYRONINE (T3): CPT | Mod: ORL | Performed by: NURSE PRACTITIONER

## 2024-04-08 PROCEDURE — 84439 ASSAY OF FREE THYROXINE: CPT | Mod: ORL | Performed by: NURSE PRACTITIONER

## 2024-04-08 PROCEDURE — 84443 ASSAY THYROID STIM HORMONE: CPT | Mod: ORL | Performed by: NURSE PRACTITIONER

## 2024-04-08 PROCEDURE — P9603 ONE-WAY ALLOW PRORATED MILES: HCPCS | Mod: ORL | Performed by: NURSE PRACTITIONER

## 2024-04-09 LAB — T4 FREE SERPL-MCNC: 1.51 NG/DL (ref 0.9–1.7)

## 2024-05-17 ENCOUNTER — LAB REQUISITION (OUTPATIENT)
Dept: LAB | Facility: CLINIC | Age: 89
End: 2024-05-17
Payer: COMMERCIAL

## 2024-05-17 DIAGNOSIS — E03.9 HYPOTHYROIDISM, UNSPECIFIED: ICD-10-CM

## 2024-05-20 PROCEDURE — 84443 ASSAY THYROID STIM HORMONE: CPT | Mod: ORL | Performed by: NURSE PRACTITIONER

## 2024-05-20 PROCEDURE — P9603 ONE-WAY ALLOW PRORATED MILES: HCPCS | Mod: ORL | Performed by: NURSE PRACTITIONER

## 2024-05-20 PROCEDURE — 36415 COLL VENOUS BLD VENIPUNCTURE: CPT | Mod: ORL | Performed by: NURSE PRACTITIONER

## 2024-05-21 LAB — TSH SERPL DL<=0.005 MIU/L-ACNC: 2.69 UIU/ML (ref 0.3–4.2)

## 2024-08-19 ENCOUNTER — APPOINTMENT (OUTPATIENT)
Dept: GENERAL RADIOLOGY | Facility: CLINIC | Age: 89
DRG: 871 | End: 2024-08-19
Attending: EMERGENCY MEDICINE
Payer: COMMERCIAL

## 2024-08-19 ENCOUNTER — APPOINTMENT (OUTPATIENT)
Dept: CT IMAGING | Facility: CLINIC | Age: 89
DRG: 871 | End: 2024-08-19
Attending: EMERGENCY MEDICINE
Payer: COMMERCIAL

## 2024-08-19 ENCOUNTER — HOSPITAL ENCOUNTER (INPATIENT)
Facility: CLINIC | Age: 89
LOS: 3 days | Discharge: NURSING, REHAB OR LONG-TERM CARE | DRG: 871 | End: 2024-08-22
Attending: EMERGENCY MEDICINE | Admitting: HOSPITALIST
Payer: COMMERCIAL

## 2024-08-19 DIAGNOSIS — I35.0 NONRHEUMATIC AORTIC VALVE STENOSIS: ICD-10-CM
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
## 2024-08-19 DIAGNOSIS — R82.90 ABNORMAL URINALYSIS: ICD-10-CM

## 2024-08-19 DIAGNOSIS — B96.20 E COLI BACTEREMIA: Primary | ICD-10-CM

## 2024-08-19 DIAGNOSIS — R78.81 E COLI BACTEREMIA: Primary | ICD-10-CM

## 2024-08-19 DIAGNOSIS — R53.1 WEAKNESS GENERALIZED: ICD-10-CM

## 2024-08-19 DIAGNOSIS — R79.89 ELEVATED TROPONIN: ICD-10-CM

## 2024-08-19 LAB
ALBUMIN SERPL BCG-MCNC: 3.9 G/DL (ref 3.5–5.2)
ALBUMIN UR-MCNC: 30 MG/DL
ALP SERPL-CCNC: 145 U/L (ref 40–150)
ALT SERPL W P-5'-P-CCNC: 25 U/L (ref 0–50)
ANION GAP SERPL CALCULATED.3IONS-SCNC: 11 MMOL/L (ref 7–15)
APPEARANCE UR: CLEAR
AST SERPL W P-5'-P-CCNC: 24 U/L (ref 0–45)
ATRIAL RATE - MUSE: 122 BPM
BACTERIA #/AREA URNS HPF: ABNORMAL /HPF
BASE EXCESS BLDV CALC-SCNC: -1 MMOL/L (ref -3–3)
BASOPHILS # BLD MANUAL: 0 10E3/UL (ref 0–0.2)
BASOPHILS NFR BLD MANUAL: 0 %
BILIRUB SERPL-MCNC: 0.5 MG/DL
BILIRUB UR QL STRIP: NEGATIVE
BUN SERPL-MCNC: 44.7 MG/DL (ref 8–23)
CALCIUM SERPL-MCNC: 9.2 MG/DL (ref 8.8–10.4)
CHLORIDE SERPL-SCNC: 100 MMOL/L (ref 98–107)
COLOR UR AUTO: ABNORMAL
CREAT SERPL-MCNC: 1.12 MG/DL (ref 0.51–0.95)
DIASTOLIC BLOOD PRESSURE - MUSE: NORMAL MMHG
EGFRCR SERPLBLD CKD-EPI 2021: 45 ML/MIN/1.73M2
EOSINOPHIL # BLD MANUAL: 0 10E3/UL (ref 0–0.7)
EOSINOPHIL NFR BLD MANUAL: 0 %
ERYTHROCYTE [DISTWIDTH] IN BLOOD BY AUTOMATED COUNT: 13.7 % (ref 10–15)
FLUAV RNA SPEC QL NAA+PROBE: NEGATIVE
FLUBV RNA RESP QL NAA+PROBE: NEGATIVE
GLUCOSE SERPL-MCNC: 108 MG/DL (ref 70–99)
GLUCOSE UR STRIP-MCNC: NEGATIVE MG/DL
HCO3 BLDV-SCNC: 24 MMOL/L (ref 21–28)
HCO3 SERPL-SCNC: 25 MMOL/L (ref 22–29)
HCT VFR BLD AUTO: 37.2 % (ref 35–47)
HGB BLD-MCNC: 12 G/DL (ref 11.7–15.7)
HGB UR QL STRIP: ABNORMAL
HOLD SPECIMEN: NORMAL
HOLD SPECIMEN: NORMAL
INTERPRETATION ECG - MUSE: NORMAL
KETONES UR STRIP-MCNC: NEGATIVE MG/DL
LACTATE BLD-SCNC: 1.2 MMOL/L
LEUKOCYTE ESTERASE UR QL STRIP: NEGATIVE
LYMPHOCYTES # BLD MANUAL: 1.7 10E3/UL (ref 0.8–5.3)
LYMPHOCYTES NFR BLD MANUAL: 7 %
MAGNESIUM SERPL-MCNC: 1.6 MG/DL (ref 1.7–2.3)
MCH RBC QN AUTO: 31.1 PG (ref 26.5–33)
MCHC RBC AUTO-ENTMCNC: 32.3 G/DL (ref 31.5–36.5)
MCV RBC AUTO: 96 FL (ref 78–100)
MONOCYTES # BLD MANUAL: 2.4 10E3/UL (ref 0–1.3)
MONOCYTES NFR BLD MANUAL: 10 %
MUCOUS THREADS #/AREA URNS LPF: PRESENT /LPF
NEUTROPHILS # BLD MANUAL: 20 10E3/UL (ref 1.6–8.3)
NEUTROPHILS NFR BLD MANUAL: 83 %
NITRATE UR QL: POSITIVE
NRBC # BLD AUTO: 0 10E3/UL
NRBC BLD AUTO-RTO: 0 /100
P AXIS - MUSE: -5 DEGREES
PCO2 BLDV: 42 MM HG (ref 40–50)
PH BLDV: 7.37 [PH] (ref 7.32–7.43)
PH UR STRIP: 6.5 [PH] (ref 5–7)
PLAT MORPH BLD: ABNORMAL
PLATELET # BLD AUTO: 193 10E3/UL (ref 150–450)
PO2 BLDV: 11 MM HG (ref 25–47)
POTASSIUM SERPL-SCNC: 5.1 MMOL/L (ref 3.4–5.3)
PR INTERVAL - MUSE: 138 MS
PROT SERPL-MCNC: 7.8 G/DL (ref 6.4–8.3)
QRS DURATION - MUSE: 66 MS
QT - MUSE: 316 MS
QTC - MUSE: 450 MS
R AXIS - MUSE: 8 DEGREES
RBC # BLD AUTO: 3.86 10E6/UL (ref 3.8–5.2)
RBC MORPH BLD: ABNORMAL
RBC URINE: 3 /HPF
RSV RNA SPEC NAA+PROBE: NEGATIVE
SAO2 % BLDV: 11 % (ref 70–75)
SARS-COV-2 RNA RESP QL NAA+PROBE: NEGATIVE
SODIUM SERPL-SCNC: 136 MMOL/L (ref 135–145)
SP GR UR STRIP: 1.02 (ref 1–1.03)
SQUAMOUS EPITHELIAL: 1 /HPF
SYSTOLIC BLOOD PRESSURE - MUSE: NORMAL MMHG
T AXIS - MUSE: 36 DEGREES
TOXIC GRANULES BLD QL SMEAR: PRESENT
TROPONIN T SERPL HS-MCNC: 46 NG/L
TROPONIN T SERPL HS-MCNC: 59 NG/L
UROBILINOGEN UR STRIP-MCNC: NORMAL MG/DL
VENTRICULAR RATE- MUSE: 122 BPM
WBC # BLD AUTO: 24.1 10E3/UL (ref 4–11)
WBC URINE: 3 /HPF

## 2024-08-19 PROCEDURE — 85007 BL SMEAR W/DIFF WBC COUNT: CPT | Performed by: EMERGENCY MEDICINE

## 2024-08-19 PROCEDURE — 87149 DNA/RNA DIRECT PROBE: CPT | Performed by: EMERGENCY MEDICINE

## 2024-08-19 PROCEDURE — 84484 ASSAY OF TROPONIN QUANT: CPT | Performed by: EMERGENCY MEDICINE

## 2024-08-19 PROCEDURE — 250N000013 HC RX MED GY IP 250 OP 250 PS 637: Performed by: HOSPITALIST

## 2024-08-19 PROCEDURE — 36415 COLL VENOUS BLD VENIPUNCTURE: CPT | Performed by: HOSPITALIST

## 2024-08-19 PROCEDURE — 36415 COLL VENOUS BLD VENIPUNCTURE: CPT | Performed by: EMERGENCY MEDICINE

## 2024-08-19 PROCEDURE — 71046 X-RAY EXAM CHEST 2 VIEWS: CPT

## 2024-08-19 PROCEDURE — 81001 URINALYSIS AUTO W/SCOPE: CPT | Performed by: EMERGENCY MEDICINE

## 2024-08-19 PROCEDURE — 96374 THER/PROPH/DIAG INJ IV PUSH: CPT

## 2024-08-19 PROCEDURE — 83735 ASSAY OF MAGNESIUM: CPT | Performed by: HOSPITALIST

## 2024-08-19 PROCEDURE — 93005 ELECTROCARDIOGRAM TRACING: CPT

## 2024-08-19 PROCEDURE — 82803 BLOOD GASES ANY COMBINATION: CPT

## 2024-08-19 PROCEDURE — 258N000003 HC RX IP 258 OP 636: Performed by: EMERGENCY MEDICINE

## 2024-08-19 PROCEDURE — 99285 EMERGENCY DEPT VISIT HI MDM: CPT | Mod: 25

## 2024-08-19 PROCEDURE — 87086 URINE CULTURE/COLONY COUNT: CPT | Performed by: EMERGENCY MEDICINE

## 2024-08-19 PROCEDURE — 250N000011 HC RX IP 250 OP 636: Performed by: EMERGENCY MEDICINE

## 2024-08-19 PROCEDURE — 87186 SC STD MICRODIL/AGAR DIL: CPT | Performed by: EMERGENCY MEDICINE

## 2024-08-19 PROCEDURE — 250N000011 HC RX IP 250 OP 636: Performed by: HOSPITALIST

## 2024-08-19 PROCEDURE — 120N000001 HC R&B MED SURG/OB

## 2024-08-19 PROCEDURE — 99223 1ST HOSP IP/OBS HIGH 75: CPT | Performed by: HOSPITALIST

## 2024-08-19 PROCEDURE — 80053 COMPREHEN METABOLIC PANEL: CPT | Performed by: EMERGENCY MEDICINE

## 2024-08-19 PROCEDURE — 258N000003 HC RX IP 258 OP 636: Performed by: HOSPITALIST

## 2024-08-19 PROCEDURE — 87637 SARSCOV2&INF A&B&RSV AMP PRB: CPT | Performed by: EMERGENCY MEDICINE

## 2024-08-19 PROCEDURE — 85041 AUTOMATED RBC COUNT: CPT | Performed by: EMERGENCY MEDICINE

## 2024-08-19 PROCEDURE — 84484 ASSAY OF TROPONIN QUANT: CPT | Performed by: HOSPITALIST

## 2024-08-19 PROCEDURE — 70450 CT HEAD/BRAIN W/O DYE: CPT

## 2024-08-19 RX ORDER — AMOXICILLIN 250 MG
1 CAPSULE ORAL 2 TIMES DAILY PRN
Status: DISCONTINUED | OUTPATIENT
Start: 2024-08-19 | End: 2024-08-22 | Stop reason: HOSPADM

## 2024-08-19 RX ORDER — LOSARTAN POTASSIUM 25 MG/1
12.5 TABLET ORAL DAILY
COMMUNITY
Start: 2024-07-22

## 2024-08-19 RX ORDER — CALCIUM CARBONATE 500 MG/1
1000 TABLET, CHEWABLE ORAL 4 TIMES DAILY PRN
Status: DISCONTINUED | OUTPATIENT
Start: 2024-08-19 | End: 2024-08-22 | Stop reason: HOSPADM

## 2024-08-19 RX ORDER — HYDRALAZINE HYDROCHLORIDE 20 MG/ML
5 INJECTION INTRAMUSCULAR; INTRAVENOUS
Status: DISCONTINUED | OUTPATIENT
Start: 2024-08-19 | End: 2024-08-22 | Stop reason: HOSPADM

## 2024-08-19 RX ORDER — ACETAMINOPHEN 650 MG/1
650 SUPPOSITORY RECTAL EVERY 4 HOURS PRN
Status: DISCONTINUED | OUTPATIENT
Start: 2024-08-19 | End: 2024-08-22 | Stop reason: HOSPADM

## 2024-08-19 RX ORDER — VITAMIN B COMPLEX
25 TABLET ORAL DAILY
Status: DISCONTINUED | OUTPATIENT
Start: 2024-08-20 | End: 2024-08-22 | Stop reason: HOSPADM

## 2024-08-19 RX ORDER — ONDANSETRON 4 MG/1
4 TABLET, ORALLY DISINTEGRATING ORAL EVERY 6 HOURS PRN
Status: DISCONTINUED | OUTPATIENT
Start: 2024-08-19 | End: 2024-08-22 | Stop reason: HOSPADM

## 2024-08-19 RX ORDER — LABETALOL HYDROCHLORIDE 5 MG/ML
10 INJECTION, SOLUTION INTRAVENOUS
Status: DISCONTINUED | OUTPATIENT
Start: 2024-08-19 | End: 2024-08-22 | Stop reason: HOSPADM

## 2024-08-19 RX ORDER — LIDOCAINE 40 MG/G
CREAM TOPICAL
Status: DISCONTINUED | OUTPATIENT
Start: 2024-08-19 | End: 2024-08-22 | Stop reason: HOSPADM

## 2024-08-19 RX ORDER — ONDANSETRON 2 MG/ML
4 INJECTION INTRAMUSCULAR; INTRAVENOUS EVERY 6 HOURS PRN
Status: DISCONTINUED | OUTPATIENT
Start: 2024-08-19 | End: 2024-08-22 | Stop reason: HOSPADM

## 2024-08-19 RX ORDER — NITROGLYCERIN 0.4 MG/1
0.4 TABLET SUBLINGUAL EVERY 5 MIN PRN
Status: DISCONTINUED | OUTPATIENT
Start: 2024-08-19 | End: 2024-08-22 | Stop reason: HOSPADM

## 2024-08-19 RX ORDER — CEFTRIAXONE 2 G/1
2 INJECTION, POWDER, FOR SOLUTION INTRAMUSCULAR; INTRAVENOUS EVERY 24 HOURS
Status: DISCONTINUED | OUTPATIENT
Start: 2024-08-19 | End: 2024-08-22

## 2024-08-19 RX ORDER — ACETAMINOPHEN 500 MG
1000 TABLET ORAL EVERY MORNING
COMMUNITY

## 2024-08-19 RX ORDER — FAMOTIDINE 20 MG/1
20 TABLET, FILM COATED ORAL 2 TIMES DAILY
Status: DISCONTINUED | OUTPATIENT
Start: 2024-08-19 | End: 2024-08-20

## 2024-08-19 RX ORDER — PIPERACILLIN SODIUM, TAZOBACTAM SODIUM 4; .5 G/20ML; G/20ML
4.5 INJECTION, POWDER, LYOPHILIZED, FOR SOLUTION INTRAVENOUS ONCE
Status: COMPLETED | OUTPATIENT
Start: 2024-08-19 | End: 2024-08-19

## 2024-08-19 RX ORDER — AMOXICILLIN 250 MG
2 CAPSULE ORAL 2 TIMES DAILY PRN
Status: DISCONTINUED | OUTPATIENT
Start: 2024-08-19 | End: 2024-08-22 | Stop reason: HOSPADM

## 2024-08-19 RX ORDER — SODIUM CHLORIDE 9 MG/ML
INJECTION, SOLUTION INTRAVENOUS CONTINUOUS
Status: DISCONTINUED | OUTPATIENT
Start: 2024-08-19 | End: 2024-08-20

## 2024-08-19 RX ORDER — ACETAMINOPHEN 325 MG/1
650 TABLET ORAL EVERY 4 HOURS PRN
Status: DISCONTINUED | OUTPATIENT
Start: 2024-08-19 | End: 2024-08-22 | Stop reason: HOSPADM

## 2024-08-19 RX ORDER — LEVOTHYROXINE SODIUM 88 UG/1
88 TABLET ORAL DAILY
Status: DISCONTINUED | OUTPATIENT
Start: 2024-08-20 | End: 2024-08-22 | Stop reason: HOSPADM

## 2024-08-19 RX ADMIN — CEFTRIAXONE SODIUM 2 G: 2 INJECTION, POWDER, FOR SOLUTION INTRAMUSCULAR; INTRAVENOUS at 17:31

## 2024-08-19 RX ADMIN — HYDRALAZINE HYDROCHLORIDE 5 MG: 20 INJECTION INTRAMUSCULAR; INTRAVENOUS at 17:31

## 2024-08-19 RX ADMIN — SODIUM CHLORIDE 1000 ML: 9 INJECTION, SOLUTION INTRAVENOUS at 14:03

## 2024-08-19 RX ADMIN — PIPERACILLIN AND TAZOBACTAM 4.5 G: 4; .5 INJECTION, POWDER, FOR SOLUTION INTRAVENOUS at 14:03

## 2024-08-19 RX ADMIN — ACETAMINOPHEN 325MG 650 MG: 325 TABLET ORAL at 17:31

## 2024-08-19 RX ADMIN — FAMOTIDINE 20 MG: 20 TABLET, FILM COATED ORAL at 20:22

## 2024-08-19 RX ADMIN — SODIUM CHLORIDE: 9 INJECTION, SOLUTION INTRAVENOUS at 17:20

## 2024-08-19 ASSESSMENT — COLUMBIA-SUICIDE SEVERITY RATING SCALE - C-SSRS
6. HAVE YOU EVER DONE ANYTHING, STARTED TO DO ANYTHING, OR PREPARED TO DO ANYTHING TO END YOUR LIFE?: NO
2. HAVE YOU ACTUALLY HAD ANY THOUGHTS OF KILLING YOURSELF IN THE PAST MONTH?: NO
1. IN THE PAST MONTH, HAVE YOU WISHED YOU WERE DEAD OR WISHED YOU COULD GO TO SLEEP AND NOT WAKE UP?: NO

## 2024-08-19 ASSESSMENT — ACTIVITIES OF DAILY LIVING (ADL)
ADLS_ACUITY_SCORE: 39
ADLS_ACUITY_SCORE: 35
ADLS_ACUITY_SCORE: 39
ADLS_ACUITY_SCORE: 35
ADLS_ACUITY_SCORE: 39
ADLS_ACUITY_SCORE: 39

## 2024-08-19 NOTE — ED PROVIDER NOTES
Emergency Department Note      History of Present Illness     Chief Complaint:  Weakness    HPI   Katrina Portillo is a 89 year old female who presents by EMS for weakness.  Patient thinks that she fell recently, possibly this morning, but is not exactly sure, and is unable to provide any other reliable details about her circumstances.  She denies having any new pain.    Independent Historian: EMS, who reported that her blood sugar was 144, heart rate in the 100s, oxygen level good on room air.  She came from the [facility] assisted living facility for weakness and increased confusion over the past week or so.    Review of External Notes: I personally reviewed prior records including printed records which reference that she is DNR, DNI.  An orthopedic note from August 12 references knee injection in her right knee.    Past Medical History     Medical History and Problem List   anemia    Medications   acetaminophen (TYLENOL) 325 MG tablet  alendronate (FOSAMAX) 70 MG tablet  aspirin (ASA) 81 MG EC tablet  calcium carbonate (TUMS) 500 MG chewable tablet  CALCIUM PO  ferrous sulfate (FEROSUL) 325 (65 Fe) MG tablet  levothyroxine (SYNTHROID/LEVOTHROID) 88 MCG tablet  losartan (COZAAR) 50 MG tablet  metoprolol succinate ER (TOPROL-XL) 25 MG 24 hr tablet  nitroGLYcerin (NITROSTAT) 0.4 MG sublingual tablet  senna-docusate (SENOKOT-S/PERICOLACE) 8.6-50 MG tablet  sertraline (ZOLOFT) 25 MG tablet  simvastatin (ZOCOR) 40 MG tablet  Vitamin D, Cholecalciferol, 25 MCG (1000 UT) TABS      Surgical History   Past Surgical History:   Procedure Laterality Date    OPEN REDUCTION INTERNAL FIXATION HIP NAILING Right 10/17/2021    Procedure: INTERNAL FIXATION, FRACTURE, TROCHANTERIC, HIP, NAILING;  Surgeon: Benjy Tirado MD;  Location:  OR     Physical Exam     Patient Vitals for the past 24 hrs:   BP Temp Temp src Pulse Resp SpO2 Weight   08/19/24 1648 (!) 160/108 (!) 101  F (38.3  C) -- 115 18 -- --   08/19/24 1514 -- -- -- 112 27  -- --   08/19/24 1509 (!) 156/99 -- -- 116 (!) 31 -- --   08/19/24 1430 (!) 155/93 -- -- (!) 126 24 -- --   08/19/24 1405 -- -- -- -- 13 99 % --   08/19/24 1403 (!) 154/98 -- -- 112 -- -- --   08/19/24 1258 (!) 146/124 -- -- (!) 123 24 90 % --   08/19/24 1241 (!) 156/109 -- -- (!) 126 11 96 % --   08/19/24 1234 (!) 156/109 99.9  F (37.7  C) Oral (!) 126 20 97 % 63.5 kg (140 lb)     Physical Exam  General: Chronically ill-appearing woman sitting upright in room 8  HENT: face nontender with full painless ROM mandible, no bony deformity, OP clear, no difficulty controlling secretions, skull nontender  Eyes: PERRL without proptosis  CV:  regular rhythm, cap refill normal in all extremities, no pitting edema  Resp: normal effort, speaks in full phrases, no stridor  GI: abdomen soft, nontender, no guarding  MSK:  Cervical spine:  no midline tenderness, FROM  Thoracic spine: no midline tenderness, no CVAT  Lumbar spine: no midline tenderness  Chest wall: nontender without crepitus  Pelvis stable  Extremities: no focal tenderness  Skin:   No abrasion  No ecchymosis  No laceration  Neuro: awake, alert, patient does not know the year, follows basic commands without difficulty, no nuchal rigidity,  equal, can lift both legs off bed, responds appropriately to commands  Psych: cooperative, no apparent hallucinations    Diagnostics   Electrocardiogram  ECG taken at 1251, ECG interpreted at 1318 by DAVE Barrera MD  Sinus tachycardia, slight artifact present  Rate 122 bpm. NJ interval 138. QRS duration 66. QTc 450    Lab Results   Labs Ordered and Resulted from Time of ED Arrival to Time of ED Departure   COMPREHENSIVE METABOLIC PANEL - Abnormal       Result Value    Sodium 136      Potassium 5.1      Carbon Dioxide (CO2) 25      Anion Gap 11      Urea Nitrogen 44.7 (*)     Creatinine 1.12 (*)     GFR Estimate 45 (*)     Calcium 9.2      Chloride 100      Glucose 108 (*)     Alkaline Phosphatase 145      AST 24      ALT 25       Protein Total 7.8      Albumin 3.9      Bilirubin Total 0.5     ROUTINE UA WITH MICROSCOPIC REFLEX TO CULTURE - Abnormal    Color Urine Light Yellow      Appearance Urine Clear      Glucose Urine Negative      Bilirubin Urine Negative      Ketones Urine Negative      Specific Gravity Urine 1.017      Blood Urine Trace (*)     pH Urine 6.5      Protein Albumin Urine 30 (*)     Urobilinogen Urine Normal      Nitrite Urine Positive (*)     Leukocyte Esterase Urine Negative      Bacteria Urine Moderate (*)     Mucus Urine Present (*)     RBC Urine 3 (*)     WBC Urine 3      Squamous Epithelials Urine 1     CBC WITH PLATELETS AND DIFFERENTIAL - Abnormal    WBC Count 24.1 (*)     RBC Count 3.86      Hemoglobin 12.0      Hematocrit 37.2      MCV 96      MCH 31.1      MCHC 32.3      RDW 13.7      Platelet Count 193      NRBCs per 100 WBC 0      Absolute NRBCs 0.0     MANUAL DIFFERENTIAL - Abnormal    % Neutrophils 83      % Lymphocytes 7      % Monocytes 10      % Eosinophils 0      % Basophils 0      Absolute Neutrophils 20.0 (*)     Absolute Lymphocytes 1.7      Absolute Monocytes 2.4 (*)     Absolute Eosinophils 0.0      Absolute Basophils 0.0      RBC Morphology Confirmed RBC Indices      Platelet Assessment        Value: Automated Count Confirmed. Platelet morphology is normal.    Toxic Neutrophils Present (*)    ISTAT GASES LACTATE VENOUS POCT - Abnormal    Lactic Acid POCT 1.2      Bicarbonate Venous POCT 24      O2 Sat, Venous POCT 11 (*)     pCO2 Venous POCT 42      pH Venous POCT 7.37      pO2 Venous POCT 11 (*)     Base Excess/Deficit (+/-) POCT -1.0     TROPONIN T, HIGH SENSITIVITY - Abnormal    Troponin T, High Sensitivity 46 (*)    INFLUENZA A/B, RSV, & SARS-COV2 PCR - Normal    Influenza A PCR Negative      Influenza B PCR Negative      RSV PCR Negative      SARS CoV2 PCR Negative     BLOOD CULTURE   URINE CULTURE     Imaging   XR Chest 2 Views   Final Result   IMPRESSION: No definite consolidation. No  effusion or pneumothorax   demonstrated. The cardiac silhouette is not enlarged. Pulmonary   vasculature is unremarkable.       KATHRINE STOKES MD            SYSTEM ID:  [id number]      CT Head w/o Contrast   Final Result   IMPRESSION:      1. No evidence of acute intracranial hemorrhage, mass, or herniation.   2. Diffuse parenchymal volume loss and white matter changes likely due   to chronic microvascular ischemic change.         DONG ARAUJO MD            SYSTEM ID:  [id number]        Independent Interpretation:   I personally reviewed CT Head images, I see no large ICH.    ED Course      Medications Administered   Medications   lidocaine 1 % 0.1-1 mL (has no administration in time range)   lidocaine (LMX4) cream (has no administration in time range)   sodium chloride (PF) 0.9% PF flush 3 mL (has no administration in time range)   sodium chloride (PF) 0.9% PF flush 3 mL (3 mLs Intracatheter $Given 8/19/24 6757)   senna-docusate (SENOKOT-S/PERICOLACE) 8.6-50 MG per tablet 1 tablet (has no administration in time range)     Or   senna-docusate (SENOKOT-S/PERICOLACE) 8.6-50 MG per tablet 2 tablet (has no administration in time range)   calcium carbonate (TUMS) chewable tablet 1,000 mg (has no administration in time range)   cefTRIAXone (ROCEPHIN) 2 g vial to attach to  ml bag for ADULTS or NS 50 ml bag for PEDS (has no administration in time range)   sodium chloride 0.9 % infusion (has no administration in time range)   acetaminophen (TYLENOL) tablet 650 mg (has no administration in time range)     Or   acetaminophen (TYLENOL) Suppository 650 mg (has no administration in time range)   ondansetron (ZOFRAN ODT) ODT tab 4 mg (has no administration in time range)     Or   ondansetron (ZOFRAN) injection 4 mg (has no administration in time range)   famotidine (PEPCID) tablet 20 mg (has no administration in time range)   levothyroxine (SYNTHROID/LEVOTHROID) tablet 88 mcg (has no administration in time range)   losartan  (COZAAR) half-tab 12.5 mg (has no administration in time range)   metoprolol succinate ER (TOPROL-XL) 24 hr half-tab 12.5 mg (has no administration in time range)   nitroGLYcerin (NITROSTAT) sublingual tablet 0.4 mg (has no administration in time range)   sertraline (ZOLOFT) tablet 50 mg (has no administration in time range)   Vitamin D3 (CHOLECALCIFEROL) tablet 25 mcg (has no administration in time range)   hydrALAZINE (APRESOLINE) injection 5 mg (has no administration in time range)   labetalol (NORMODYNE/TRANDATE) injection 10 mg (has no administration in time range)   sodium chloride 0.9% BOLUS 1,000 mL (1,000 mLs Intravenous $New Bag 8/19/24 1403)   piperacillin-tazobactam (ZOSYN) 4.5 g vial to attach to  mL bag (0 g Intravenous Stopped 8/19/24 1433)     Procedures:  None    ED Course and Discussion of Management   ED Course as of 08/19/24 1719   Mon Aug 19, 2024   1410 I spoke with Dr. Price, Hospitalist, who accepts care.       Additional Documentation  None    Medical Decision Making / Diagnosis   Time zero, but most recentfor sepsis was 1310, when sepsis first suspected. BCx drawn before IV Zosyn.  Not hypotensive, lactate normal so no indication for pressors or 30/kg IVF or ICU.      Medical Decision Making:  Patient is a poor historian which made her evaluation more challenging, we spoke with her daughter, I also performed chart review and reviewed printed records brought from her care facility.  In the setting of reported recent trauma, I considered a Friday of possibly serious injuries including intracranial hemorrhage though none is seen on head CT, no signs or symptoms of spinal fracture or major thoracic trauma.  Chest x-ray shows no pneumothorax.  However, I am concerned by her new significant leukocytosis and elevated temperature, for this reason blood culture was sent and empiric Zosyn was administered.  Urinalysis ultimately resulted abnormal, no major pyuria but she is positive for  nitrite, this could be the source.  No skin findings to confirm a cellulitis.  Meningitis considered but low suspicion based on exam so lumbar puncture was deferred.  I have arranged for her to be admitted to the hospitalist service for further multidisciplinary care.    Disposition   Admit    Diagnosis     ICD-10-CM    1. Weakness generalized  R53.1       2. Fall, initial encounter  W19.XXXA       3. Abnormal urinalysis  R82.90       4. Elevated troponin  R79.89            8/19/2024   MD Holly Coto, Tremaine Harris MD  08/19/24 1724

## 2024-08-19 NOTE — ED TRIAGE NOTES
Pt BIBA from assisted living for generalized weakness and increased confusion since today. -110 for EMS all other vitals stable on RA. Blood sugar 144. Pt says she has had some falls recently but unable to tell EMS when. No injuries per pt

## 2024-08-19 NOTE — ED NOTES
[facility]  ED Nurse Handoff Report    ED Chief complaint: Generalized Weakness and Fever      ED Diagnosis:   Final diagnoses:   Weakness generalized   Fall, initial encounter   Abnormal urinalysis   Elevated troponin       Code Status: DNR / DNI    Allergies:   Allergies   Allergen Reactions    Procaine      Other reaction(s): Tachycardia    Tetanus Toxoids     Latex      Possible allergy historically per patient    Tetanus Antitoxin      Other reaction(s): Edema  Arm doubled in size       Patient Story: Katrina Portillo is a 89 year old female with medical history significant for hypertension, hypothyroidism, depression, CKD stage III, MONCHO who resides at assisted living facility was brought to the ER by EMS for evaluation of generalized weakness, confusion and fall, found to have a possible UTI with sepsis and admitted on 8/19/2024.      Focused Assessment:  Ongoing confusion compared to baseline. Not attempted to get out of bedduring duration of stay in ED.    Treatments and/or interventions provided: IV abx, IVF  Patient's response to treatments and/or interventions: No adverse reactions    To be done/followed up on inpatient unit:  Inpatient orders    Does this patient have any cognitive concerns?: Disoriented to place and Disoriented to situation    Activity level - Baseline/Home:  Independent and Stand with Assist  Activity Level - Current:   Stand with Assist    Patient's Preferred language: English   Needed?: No    Isolation: None  Infection: Not Applicable  Patient tested for COVID 19 prior to admission: NO  Bariatric?: No    Vital Signs:   Vitals:    08/19/24 1405 08/19/24 1430 08/19/24 1509 08/19/24 1514   BP:  (!) 155/93 (!) 156/99    Pulse:  (!) 126 116 112   Resp: 13 24 (!) 31 27   Temp:       TempSrc:       SpO2: 99%      Weight:           Cardiac Rhythm:Cardiac Rhythm: Sinus tachycardia      For the majority of the shift this patient's behavior was Green.   Behavioral  interventions performed were N/A.    ED NURSE PHONE NUMBER: [phone]

## 2024-08-19 NOTE — PHARMACY-ADMISSION MEDICATION HISTORY
Pharmacist Admission Medication History    Admission medication history is complete. The information provided in this note is only as accurate as the sources available at the time of the update.    Information Source(s): Family member and CareEverywhere/SureScripts via phone    Pertinent Information: Reviewed medication list with patient's daughterDanika     Changes made to PTA medication list:  Added: None  Deleted: alendronate, aspirin, ferrous sulfate, simvastatin   Changed: losartan (50 mg daily to 12.5 mg daily), metoprolol succinate (25 mg daily to 12.5 mg daily, sertraline (12.5 mg to 50 mg)     Allergies reviewed with patient and updates made in EHR: no    Medication History Completed By: EDNA JENKINS [facility] 8/19/2024 3:58 PM    PTA Med List   Medication Sig Last Dose    acetaminophen (TYLENOL) 500 MG tablet Take 1,000 mg by mouth every morning 8/19/2024    calcium carbonate (TUMS) 500 MG chewable tablet Take 1 tablet (500 mg) by mouth 3 times daily as needed for heartburn Unknown    levothyroxine (SYNTHROID/LEVOTHROID) 88 MCG tablet Take 88 mcg by mouth daily 8/19/2024    losartan (COZAAR) 25 MG tablet Take 12.5 mg by mouth daily 8/19/2024    metoprolol succinate ER (TOPROL-XL) 25 MG 24 hr tablet Take 12.5 mg by mouth daily 8/19/2024    nitroGLYcerin (NITROSTAT) 0.4 MG sublingual tablet Place 0.4 mg under the tongue every 5 minutes as needed for chest pain For chest pain place 1 tablet under the tongue every 5 minutes for 3 doses. If symptoms persist 5 minutes after 1st dose call 911. Unknown    sertraline (ZOLOFT) 50 MG tablet Take 50 mg by mouth daily 8/19/2024    Vitamin D, Cholecalciferol, 25 MCG (1000 UT) TABS Take 1 tablet by mouth daily 8/19/2024

## 2024-08-19 NOTE — PROGRESS NOTES
RECEIVING UNIT ED HANDOFF REVIEW    ED Nurse Handoff Report was reviewed by: Yamilet Iglesias RN on August 19, 2024 at 3:53 PM

## 2024-08-19 NOTE — H&P
[facility]    History and Physical - Hospitalist Service       Date of Admission:  8/19/2024    Assessment & Plan      Katrina Portillo is a 89 year old female with medical history significant for hypertension, hypothyroidism, depression, CKD stage III, MONCHO who resides at assisted living facility was brought to the ER by EMS for evaluation of generalized weakness, confusion and fall, found to have a possible UTI with sepsis and admitted on 8/19/2024.      Suspected UTI  Sepsis likely due to above  Denies urinary symptoms.  No respiratory symptoms.  Noted marked leukocytosis.  Lactic acid not elevated.  No nausea vomiting, abdominal pain or diarrhea.  Noted UA with 3 pus cells and positive for nitrite.  COVID-19, influenza A and B and RSV pending.  Chest x-ray without pneumonia.  -Admit to inpatient  -IV Zosyn in ER, I will continue IV Rocephin 2 g IV daily  -IV hydration  -Follow urine and blood cultures obtained from the ER    Generalized weakness and fall  Possible mild acute encephalopathy  Likely related to above.  Patient also appears somewhat dehydrated.  Reports being dizzy.  CT head negative for acute intracranial process.  Patient denies any bone or joint pain.  -IV hydration  -Check orthostatic if able  -neurochecks but minimize interruption at night.   - If agitated, will order low dose seroquel.   - PT, OT eval.  Social work consult for discharge planning.  Patient lives at assisted living facility.    Hypertension.   Sinus tachycardia  Elevated troponin, likely type II MI due to sepsis  Heart rate in 110-120s, could be due to sepsis and or dehydration.  Reports being dizzy.  No chest pain.  On beta-blocker.  -Controlled, Continue PTA medications including losartan and metoprolol  -Check TSH  -IV hydration  -Repeat troponin x 1, if uptrending, will plan TTE and continue PTA ASA and monitor on telemetry      Hypothyroidism  -Resume PTA Synthroid    Depression  -Resume PTA  sertraline    CKD stage III  -Creatinine 1.12 which is her baseline, monitor    MONCHO    Heart murmur   -noted        Diet:  regular   DVT Prophylaxis: Pneumatic Compression Devices  Mendoza Catheter: Not present  Lines: None     Cardiac Monitoring: None  Code Status:  DNR/DNI    Clinically Significant Risk Factors Present on Admission                # Drug Induced Platelet Defect: home medication list includes an antiplatelet medication   # Hypertension: Home medication list includes antihypertensive(s)                          Disposition Plan     Medically Ready for Discharge: Anticipated in 2-4 Days           Zan Price MD  Hospitalist Service  [facility]  Securely message with [employer] (more info)  Text page via [facility] Paging/Directory     ______________________________________________________________________    Chief Complaint   Generalized weakness  Confusion    History is obtained mostly from ER MD and patient's daughter and over the phone.  I also interviewed the patient.    History of Present Illness   Katrina Portillo is a 89 year old female with medical history significant for hypertension, hypothyroidism, depression, CKD stage III, MONCHO who resides at assisted living facility was brought to the ER by EMS for evaluation of above. Patient reports that she has felt dizzy and fallen but could not provide much detail.  I called her daughter, who stated patient fell last Tuesday.  Patient denies any pain.  No nausea, vomiting, diarrhea, abdominal pain, cough or shortness of breath although daughter reported staff at care facility felt she was wheezy today. Patient also was weak in general and confused and so she was sent to ER for evaluation,    In ER, patient was evaluated by Dr Barrera.  Temp of 99.9, patient tachycardic to 120s.  Labs shows leukocytosis of 24.1, lactic acid normal at 1.2, troponin mildly elevated at 46.  Creatinine 1.12 which is her baseline.  CT head was  negative for acute intracranial process.  Chest x-ray without consolidation, effusion or pneumothorax.  UA was positive for nitrite but only 3+ cells and 3 RBCs with moderate bacteria.  Urine culture was sent.  Blood culture sent.  Patient was restarted on Zosyn and IV fluid and hospitalist contacted for admission.    Past Medical History    No past medical history on file.    Past Surgical History   Past Surgical History:   Procedure Laterality Date    OPEN REDUCTION INTERNAL FIXATION HIP NAILING Right 10/17/2021    Procedure: INTERNAL FIXATION, FRACTURE, TROCHANTERIC, HIP, NAILING;  Surgeon: Benjy Tirado MD;  Location:  OR       Prior to Admission Medications   Prior to Admission Medications   Prescriptions Last Dose Informant Patient Reported? Taking?   CALCIUM PO  Daughter Yes No   Sig: Take 1 tablet by mouth daily   Vitamin D, Cholecalciferol, 25 MCG (1000 UT) TABS  Daughter Yes No   Sig: Take 1 tablet by mouth daily   acetaminophen (TYLENOL) 325 MG tablet   No No   Sig: Take 2 tablets (650 mg) by mouth every 4 hours as needed for other (For optimal non-opioid multimodal pain management to improve pain control.)   alendronate (FOSAMAX) 70 MG tablet  Daughter Yes No   Sig: Take 70 mg by mouth every 7 days On Mondays   aspirin (ASA) 81 MG EC tablet   No No   Sig: Take 1 tablet (81 mg) by mouth 2 times daily   calcium carbonate (TUMS) 500 MG chewable tablet   No No   Sig: Take 1 tablet (500 mg) by mouth 3 times daily as needed for heartburn   ferrous sulfate (FEROSUL) 325 (65 Fe) MG tablet   No No   Sig: Take 1 tablet (325 mg) by mouth every other day   levothyroxine (SYNTHROID/LEVOTHROID) 88 MCG tablet  Daughter Yes No   Sig: Take 88 mcg by mouth daily   losartan (COZAAR) 50 MG tablet   No No   Sig: Take 1 tablet (50 mg) by mouth daily   metoprolol succinate ER (TOPROL-XL) 25 MG 24 hr tablet  Daughter Yes No   Sig: Take 25 mg by mouth daily   nitroGLYcerin (NITROSTAT) 0.4 MG sublingual tablet  Daughter Yes No    Sig: Place 0.4 mg under the tongue every 5 minutes as needed for chest pain For chest pain place 1 tablet under the tongue every 5 minutes for 3 doses. If symptoms persist 5 minutes after 1st dose call 911.   senna-docusate (SENOKOT-S/PERICOLACE) 8.6-50 MG tablet   No No   Sig: Take 1 tablet by mouth 2 times daily as needed for constipation   sertraline (ZOLOFT) 25 MG tablet  Daughter Yes No   Sig: Take 12.5 mg by mouth daily   simvastatin (ZOCOR) 40 MG tablet  Daughter Yes No   Sig: Take 40 mg by mouth At Bedtime      Facility-Administered Medications: None        Review of Systems    The 10 point Review of Systems is negative other than noted in the HPI or here.      Social History   I have reviewed this patient's social history and updated it with pertinent information if needed.  Social History     Tobacco Use    Smoking status: Never    Smokeless tobacco: Never   Substance Use Topics    Alcohol use: Never    Drug use: Never         Family History     Reviewed, not pertinent at this time to her presentation      Allergies   Allergies   Allergen Reactions    Procaine      Other reaction(s): Tachycardia    Tetanus Toxoids     Latex      Possible allergy historically per patient    Tetanus Antitoxin      Other reaction(s): Edema  Arm doubled in size        Physical Exam   Vital Signs: Temp: 99.9  F (37.7  C) Temp src: Oral BP: (!) 154/98 Pulse: 112   Resp: 13 SpO2: 99 % O2 Device: None (Room air)    Weight: 140 lbs 0 oz    General: AAOx3, appears comfortable.  HEENT: PERRLA EOMI. Mucosa dry  Lungs: Bilateral equal air entry. Clear to auscultation, normal work of breathing.   CVS: S1S2 regular, tachycardic, systolic murmur present.   Abdomen: Soft, NT, ND. BS heard.  MSK: No edema or deformities.  Neuro: Alert awake, knows she is in hospital, was able to correctly tell me the month and day of her birth date. CN 2-12 normal. Strength symmetrical.  Skin: No rash.       Medical Decision Making       75 MINUTES SPENT  BY ME on the date of service doing chart review, history, exam, documentation & further activities per the note.      Data     I have personally reviewed the following data over the past 24 hrs:    24.1 (H)  \   12.0   / 193     136 100 44.7 (H) /  108 (H)   5.1 25 1.12 (H) \     ALT: 25 AST: 24 AP: 145 TBILI: 0.5   ALB: 3.9 TOT PROTEIN: 7.8 LIPASE: N/A     Trop: 46 (H) BNP: N/A     Procal: N/A CRP: N/A Lactic Acid: 1.2         Imaging results reviewed over the past 24 hrs:   Recent Results (from the past 24 hour(s))   CT Head w/o Contrast    Narrative    CT SCAN OF THE HEAD WITHOUT CONTRAST   8/19/2024 1:39 PM     HISTORY: falls, confusion    TECHNIQUE:  Axial images of the head and coronal reformations without  IV contrast material. Radiation dose for this scan was reduced using  automated exposure control, adjustment of the mA and/or kV according  to patient size, or iterative reconstruction technique.    COMPARISON: None.    FINDINGS: There is no evidence of intracranial hemorrhage, mass, acute  infarct or anomaly. Moderate generalized parenchymal volume loss with  associated ex vacuo dilatation of the ventricles. Confluent  periventricular white matter hypodensities which are nonspecific, but  likely related to advanced chronic microvascular ischemic disease.     The visualized portions of the sinuses and mastoids appear normal. The  bony calvarium and bones of the skull base appear intact.       Impression    IMPRESSION:     1. No evidence of acute intracranial hemorrhage, mass, or herniation.  2. Diffuse parenchymal volume loss and white matter changes likely due  to chronic microvascular ischemic change.      DONG ARAUJO MD         SYSTEM ID:  [id number]   XR Chest 2 Views    Narrative    CHEST TWO VIEWS  8/19/2024 1:46 PM     HISTORY: Weakness, falls, WBC 24k.    COMPARISON: October 16, 2021       Impression    IMPRESSION: No definite consolidation. No effusion or pneumothorax  demonstrated. The cardiac  silhouette is not enlarged. Pulmonary  vasculature is unremarkable.     KATHRINE STOKES MD         SYSTEM ID:  [id number]

## 2024-08-19 NOTE — ED NOTES
Bed: ED08  Expected date:   Expected time:   Means of arrival:   Comments:  HEMS 419 89F weak and confused ETA 1210P

## 2024-08-19 NOTE — ED NOTES
Spoke with patient's daughter Danika on the phone- she expresses concern that she (the daughter) has COVID, and does not want to come in if she is contagious. Patient had a fall last Tuesday.  Daughter is concerned that she is a little bit more confused than normal.

## 2024-08-20 ENCOUNTER — APPOINTMENT (OUTPATIENT)
Dept: PHYSICAL THERAPY | Facility: CLINIC | Age: 89
DRG: 871 | End: 2024-08-20
Attending: HOSPITALIST
Payer: COMMERCIAL

## 2024-08-20 ENCOUNTER — APPOINTMENT (OUTPATIENT)
Dept: CARDIOLOGY | Facility: CLINIC | Age: 89
DRG: 871 | End: 2024-08-20
Attending: HOSPITALIST
Payer: COMMERCIAL

## 2024-08-20 ENCOUNTER — APPOINTMENT (OUTPATIENT)
Dept: OCCUPATIONAL THERAPY | Facility: CLINIC | Age: 89
DRG: 871 | End: 2024-08-20
Attending: HOSPITALIST
Payer: COMMERCIAL

## 2024-08-20 LAB
ACINETOBACTER SPECIES: NOT DETECTED
ANION GAP SERPL CALCULATED.3IONS-SCNC: 11 MMOL/L (ref 7–15)
BASOPHILS # BLD MANUAL: 0 10E3/UL (ref 0–0.2)
BASOPHILS NFR BLD MANUAL: 0 %
BUN SERPL-MCNC: 33.4 MG/DL (ref 8–23)
CALCIUM SERPL-MCNC: 8.2 MG/DL (ref 8.8–10.4)
CHLORIDE SERPL-SCNC: 105 MMOL/L (ref 98–107)
CITROBACTER SPECIES: NOT DETECTED
CREAT SERPL-MCNC: 1.01 MG/DL (ref 0.51–0.95)
CTX-M: NOT DETECTED
EGFRCR SERPLBLD CKD-EPI 2021: 51 ML/MIN/1.73M2
ENTEROBACTER SPECIES: NOT DETECTED
EOSINOPHIL # BLD MANUAL: 0 10E3/UL (ref 0–0.7)
EOSINOPHIL NFR BLD MANUAL: 0 %
ERYTHROCYTE [DISTWIDTH] IN BLOOD BY AUTOMATED COUNT: 13.5 % (ref 10–15)
ESCHERICHIA COLI: DETECTED
GLUCOSE SERPL-MCNC: 91 MG/DL (ref 70–99)
HCO3 SERPL-SCNC: 20 MMOL/L (ref 22–29)
HCT VFR BLD AUTO: 32.6 % (ref 35–47)
HGB BLD-MCNC: 10.6 G/DL (ref 11.7–15.7)
IMP: NOT DETECTED
KLEBSIELLA OXYTOCA: NOT DETECTED
KLEBSIELLA PNEUMONIAE: NOT DETECTED
KPC: NOT DETECTED
LACTATE SERPL-SCNC: 0.7 MMOL/L (ref 0.7–2)
LVEF ECHO: NORMAL
LYMPHOCYTES # BLD MANUAL: 0.8 10E3/UL (ref 0.8–5.3)
LYMPHOCYTES NFR BLD MANUAL: 5 %
MAGNESIUM SERPL-MCNC: 1.7 MG/DL (ref 1.7–2.3)
MCH RBC QN AUTO: 31.2 PG (ref 26.5–33)
MCHC RBC AUTO-ENTMCNC: 32.5 G/DL (ref 31.5–36.5)
MCV RBC AUTO: 96 FL (ref 78–100)
MONOCYTES # BLD MANUAL: 0.7 10E3/UL (ref 0–1.3)
MONOCYTES NFR BLD MANUAL: 4 %
NDM: NOT DETECTED
NEUTROPHILS # BLD MANUAL: 14.8 10E3/UL (ref 1.6–8.3)
NEUTROPHILS NFR BLD MANUAL: 91 %
NRBC # BLD AUTO: 0 10E3/UL
NRBC BLD AUTO-RTO: 0 /100
OXA (DETECTED/NOT DETECTED): NOT DETECTED
PLAT MORPH BLD: ABNORMAL
PLATELET # BLD AUTO: 160 10E3/UL (ref 150–450)
POTASSIUM SERPL-SCNC: 4.5 MMOL/L (ref 3.4–5.3)
PROTEUS SPECIES: NOT DETECTED
PSEUDOMONAS AERUGINOSA: NOT DETECTED
RBC # BLD AUTO: 3.4 10E6/UL (ref 3.8–5.2)
RBC MORPH BLD: ABNORMAL
SODIUM SERPL-SCNC: 136 MMOL/L (ref 135–145)
VIM: NOT DETECTED
WBC # BLD AUTO: 16.3 10E3/UL (ref 4–11)

## 2024-08-20 PROCEDURE — 83605 ASSAY OF LACTIC ACID: CPT | Performed by: HOSPITALIST

## 2024-08-20 PROCEDURE — 93306 TTE W/DOPPLER COMPLETE: CPT | Mod: 26 | Performed by: INTERNAL MEDICINE

## 2024-08-20 PROCEDURE — 85007 BL SMEAR W/DIFF WBC COUNT: CPT | Performed by: HOSPITALIST

## 2024-08-20 PROCEDURE — 250N000013 HC RX MED GY IP 250 OP 250 PS 637: Performed by: HOSPITALIST

## 2024-08-20 PROCEDURE — 99222 1ST HOSP IP/OBS MODERATE 55: CPT | Performed by: SPECIALIST

## 2024-08-20 PROCEDURE — 80048 BASIC METABOLIC PNL TOTAL CA: CPT | Performed by: HOSPITALIST

## 2024-08-20 PROCEDURE — 97535 SELF CARE MNGMENT TRAINING: CPT | Mod: GO

## 2024-08-20 PROCEDURE — 85027 COMPLETE CBC AUTOMATED: CPT | Performed by: HOSPITALIST

## 2024-08-20 PROCEDURE — 36415 COLL VENOUS BLD VENIPUNCTURE: CPT | Performed by: HOSPITALIST

## 2024-08-20 PROCEDURE — 97161 PT EVAL LOW COMPLEX 20 MIN: CPT | Mod: GP | Performed by: PHYSICAL THERAPIST

## 2024-08-20 PROCEDURE — 97530 THERAPEUTIC ACTIVITIES: CPT | Mod: GP | Performed by: PHYSICAL THERAPIST

## 2024-08-20 PROCEDURE — 99232 SBSQ HOSP IP/OBS MODERATE 35: CPT | Performed by: HOSPITALIST

## 2024-08-20 PROCEDURE — C8929 TTE W OR WO FOL WCON,DOPPLER: HCPCS

## 2024-08-20 PROCEDURE — 97530 THERAPEUTIC ACTIVITIES: CPT | Mod: GO

## 2024-08-20 PROCEDURE — 97165 OT EVAL LOW COMPLEX 30 MIN: CPT | Mod: GO

## 2024-08-20 PROCEDURE — 120N000001 HC R&B MED SURG/OB

## 2024-08-20 PROCEDURE — 255N000002 HC RX 255 OP 636: Performed by: HOSPITALIST

## 2024-08-20 PROCEDURE — 258N000003 HC RX IP 258 OP 636: Performed by: HOSPITALIST

## 2024-08-20 PROCEDURE — 999N000208 ECHOCARDIOGRAM COMPLETE

## 2024-08-20 PROCEDURE — 83735 ASSAY OF MAGNESIUM: CPT | Performed by: HOSPITALIST

## 2024-08-20 PROCEDURE — 250N000011 HC RX IP 250 OP 636: Performed by: HOSPITALIST

## 2024-08-20 RX ORDER — SODIUM CHLORIDE 9 MG/ML
INJECTION, SOLUTION INTRAVENOUS CONTINUOUS
Status: ACTIVE | OUTPATIENT
Start: 2024-08-20 | End: 2024-08-20

## 2024-08-20 RX ORDER — FAMOTIDINE 20 MG/1
20 TABLET, FILM COATED ORAL DAILY
Status: DISCONTINUED | OUTPATIENT
Start: 2024-08-21 | End: 2024-08-22 | Stop reason: HOSPADM

## 2024-08-20 RX ADMIN — FAMOTIDINE 20 MG: 20 TABLET, FILM COATED ORAL at 08:07

## 2024-08-20 RX ADMIN — HUMAN ALBUMIN MICROSPHERES AND PERFLUTREN 3 ML: 10; .22 INJECTION, SOLUTION INTRAVENOUS at 15:27

## 2024-08-20 RX ADMIN — METOPROLOL SUCCINATE 12.5 MG: 25 TABLET, EXTENDED RELEASE ORAL at 08:07

## 2024-08-20 RX ADMIN — LEVOTHYROXINE SODIUM 88 MCG: 88 TABLET ORAL at 06:53

## 2024-08-20 RX ADMIN — SODIUM CHLORIDE: 9 INJECTION, SOLUTION INTRAVENOUS at 14:41

## 2024-08-20 RX ADMIN — LOSARTAN POTASSIUM 12.5 MG: 25 TABLET, FILM COATED ORAL at 08:30

## 2024-08-20 RX ADMIN — Medication 25 MCG: at 08:07

## 2024-08-20 RX ADMIN — CEFTRIAXONE SODIUM 2 G: 2 INJECTION, POWDER, FOR SOLUTION INTRAMUSCULAR; INTRAVENOUS at 18:02

## 2024-08-20 RX ADMIN — SERTRALINE HYDROCHLORIDE 50 MG: 50 TABLET ORAL at 08:07

## 2024-08-20 ASSESSMENT — ACTIVITIES OF DAILY LIVING (ADL)
ADLS_ACUITY_SCORE: 36
ADLS_ACUITY_SCORE: 40
ADLS_ACUITY_SCORE: 41
ADLS_ACUITY_SCORE: 35
ADLS_ACUITY_SCORE: 41
ADLS_ACUITY_SCORE: 45
ADLS_ACUITY_SCORE: 36
ADLS_ACUITY_SCORE: 35
ADLS_ACUITY_SCORE: 36
ADLS_ACUITY_SCORE: 45
ADLS_ACUITY_SCORE: 35
ADLS_ACUITY_SCORE: 40
ADLS_ACUITY_SCORE: 45
ADLS_ACUITY_SCORE: 40
ADLS_ACUITY_SCORE: 45
DEPENDENT_IADLS:: CLEANING;COOKING;LAUNDRY;SHOPPING;MEAL PREPARATION;TRANSPORTATION
ADLS_ACUITY_SCORE: 40
ADLS_ACUITY_SCORE: 36
ADLS_ACUITY_SCORE: 35
ADLS_ACUITY_SCORE: 36
ADLS_ACUITY_SCORE: 36

## 2024-08-20 NOTE — CONSULTS
Care Management Initial Consult    General Information  Assessment completed with: Patient, Care Team Member, Katrina Escamilla RN, Director of Health & Wellness [phone]  Type of CM/SW Visit: Initial Assessment    Primary Care Provider verified and updated as needed: Yes   Readmission within the last 30 days: no previous admission in last 30 days      Reason for Consult: discharge planning  Advance Care Planning:            Communication Assessment  Patient's communication style: spoken language (English or Bilingual)    Hearing Difficulty or Deaf: no   Wear Glasses or Blind: yes    Cognitive  Cognitive/Neuro/Behavioral: .WDL except  Level of Consciousness: alert  Arousal Level: opens eyes spontaneously  Orientation: disoriented to, time  Mood/Behavior: calm, cooperative  Best Language: 0 - No aphasia  Speech: clear, spontaneous, logical    Living Environment:   People in home: alone     Current living Arrangements: assisted living  [facility] of Facility: [facility]   Able to return to prior arrangements: yes       Family/Social Support:  Care provided by: self, other (see comments) (Personal Care attendant)  Provides care for: no one  Marital Status:   Children, Facility resident(s)/Staff, PCA          Description of Support System: Supportive, Involved         Current Resources:   Patient receiving home care services: No     Community Resources: PCA (paying privately for a Personal Care Attendent)  Equipment currently used at home: wheelchair, manual  Supplies currently used at home: Incontinence Supplies    Employment/Financial:  Employment Status:          Financial Concerns: none   Referral to Financial Worker: No       Does the patient's insurance plan have a 3 day qualifying hospital stay waiver?  No    Lifestyle & Psychosocial Needs:  Social Determinants of Health     Food Insecurity: No Food Insecurity (4/20/2022)    Received from [employer] [facility]    [employer]  Insecurity     Worried About Running Out of Food in the Last Year: 1   Depression: Not at risk (4/20/2022)    Received from [place] [employer] [facility]    PHQ-2     PHQ-2 TOTAL SCORE: 0   Housing Stability: Low Risk  (4/20/2022)    Received from [place] [employer] [facility]    Housing Stability     Unable to Pay for Housing in the Last Year: 1   Tobacco Use: Medium Risk (7/21/2022)    Received from [place] [employer] [facility]    Patient History     Smoking Tobacco Use: Former     Smokeless Tobacco Use: Never     Passive Exposure: Not on file   Financial Resource Strain: Low Risk  (4/20/2022)    Received from [place] [employer] [facility]    Financial Resource Strain     Difficulty of Paying Living Expenses: 3     Difficulty of Paying Living Expenses: Not on file   Alcohol Use: Not on file   Transportation Needs: No Transportation Needs (4/20/2022)    Received from [place] [employer] [facility]    Transportation Needs     Lack of Transportation (Medical): 1   Physical Activity: Not on file   Interpersonal Safety: Not on file   Stress: Not on file   Social Connections: Unknown (4/23/2023)    [facility] [employer] [facility]    Social Connections     Frequency of Communication with Friends and Family: Not on file   Health Literacy: Not on file       Functional Status:  Prior to admission patient needed assistance:   Dependent ADLs:: Bathing, Wheelchair-independent  Dependent IADLs:: Cleaning, Cooking, Laundry, Shopping, Meal Preparation, Transportation       Mental Health Status:          Chemical Dependency Status:                Values/Beliefs:  Spiritual, Cultural Beliefs, Mormon Practices, Values that affect care: no               Additional Information:  Per consult for discharge planning and per chart review pt resides at [facility] ([phone]), called and spoke to Francine HOWARD, Director  "of Health & Wellness [phone].  Per Francine HOWARD, pt receives no services and receives assistance of her neighbors to get to the dining room for her meals.  Patient is in her wheel chair when out of bed and is able to transfer independently.  Francine shared that she pays privately for a Personal Care Attendant.  Francine confirmed that patient takes her own medications and has her medications filled at [facility].  Informed Francine that PT & OT are recommending home care,as pt is requiring assistance of 1, and that pt would be ready for discharge today or Thursday.  Per Francine, an onsite assessment prior to returning to [facility] and this will be done either today or tomorrow.   Francine RN requested discharge orders be faxed to [phone].  Francine shared that Trini HOWARD,  [phone] will be completing the assessment and should be the contact tomorrow 8/21.  Met with patient to discuss discharge plan. Patient confirmed that she is independent with her cares and has a Personal Care Attendant that she pay privately that \"does anything I ask\".  Patient did confirm that she assists her with shower, cleaning and laundry.   Discussed that PT/OT are recommending home care.  Explained home care and pt agreed to RN, PT/OT and had no home care preference.  Patient was informed that Shwetha HOWARD would be doing an onsite assessment prior to returning to the [facility] as she is now requiring assistance of 1 with transfers.  Discussed PCP follow-up and pt requested to have follow-up appt scheduled.  Scheduled pt to see Dr. Hernandez on August 28th at 1:30PM-added to AVS.  Per pt, her son or daughter will be able to transport her home at discharge.  Referral sent to [facility] via hub.    Addendum:  [facility] able to accept for Skilled RN, PT/OT.  Inpatient Care Coordinator will continue to follow for discharge planning.   Clementina Stark RN  Clementina Stark RN, BSN, OCN   Inpatient Care Coordination [address]  M " [facility]  Office: [phone]

## 2024-08-20 NOTE — PROGRESS NOTES
08/20/24 1327   Appointment Info   Signing Clinician's Name / Credentials (PT) Deonna Caballero PT   Living Environment   People in Home facility resident   Current Living Arrangements assisted living   Home Accessibility no concerns   Transportation Anticipated family or friend will provide   Living Environment Comments pt lives at the [facility] in [place], her kids provide transport, she has own apartment, walk in shower with short ledge, grab bars, high toilet stools with grab bars.   Self-Care   Usual Activity Tolerance good   Current Activity Tolerance moderate   Regular Exercise No   Equipment Currently Used at Home commode chair;dressing device;grab bar, toilet;grab bar, tub/shower;raised toilet seat;shower chair;walker, sydni;wheelchair, power;other (see comments)   Fall history within last six months yes   Number of times patient has fallen within last six months 1   Activity/Exercise/Self-Care Comment pt has assist with ADLs and get up to w/c with assist 1 at baseline. Pt reports mainly SBA for transfer. Pt does not walk at baseline more than a few steps at times   General Information   Onset of Illness/Injury or Date of Surgery 08/19/24   Referring Physician Dr Price   Patient/Family Therapy Goals Statement (PT) hopes to go to [facility] soon   Pertinent History of Current Problem (include personal factors and/or comorbidities that impact the POC) Pt admitted with weakness and fall, found to have UTI with sepsis.   Cognition   Affect/Mental Status (Cognition) WFL   Orientation Status (Cognition) oriented x 3   Cognitive Status Comments appears to be at baseline, mild cognitive impairment   Pain Assessment   Patient Currently in Pain No   Integumentary/Edema   Integumentary/Edema no deficits were identifed   Posture    Posture Forward head position   Range of Motion (ROM)   Range of Motion ROM is WFL   Strength (Manual Muscle Testing)   Strength (Manual Muscle Testing) Deficits observed during functional mobility    Bed Mobility   Comment, (Bed Mobility) supine to sit with rail and min assist 1, slow and labored, needed mod assist 1 to scoot to EOB   Transfers   Comment, (Transfers) sit to stand with min assist 1   Gait/Stairs (Locomotion)   Comment, (Gait/Stairs) unable - pt does not ambulate at baseline   Balance   Balance Comments good static sitting balance   Sensory Examination   Sensory Perception patient reports no sensory changes   Clinical Impression   Criteria for Skilled Therapeutic Intervention Yes, treatment indicated   PT Diagnosis (PT) impaired functional mobility   Influenced by the following impairments decreased strength and endurance   Functional limitations due to impairments decreased strength, fall risk   Clinical Presentation (PT Evaluation Complexity) stable   Clinical Presentation Rationale per medical record   Clinical Decision Making (Complexity) low complexity   Planned Therapy Interventions (PT) balance training;strengthening;transfer training   Risk & Benefits of therapy have been explained evaluation/treatment results reviewed;care plan/treatment goals reviewed;risks/benefits reviewed;current/potential barriers reviewed;participants voiced agreement with care plan;participants included;patient;son   PT Total Evaluation Time   PT Eval, Low Complexity Minutes (08719) 15   Physical Therapy Goals   PT Frequency 5x/week   PT Predicted Duration/Target Date for Goal Attainment 08/27/24   PT Goals Bed Mobility;Transfers   PT: Bed Mobility Modified independent;Supine to/from sit   PT: Transfers Supervision/stand-by assist;Sit to/from stand;Bed to/from chair   Interventions   Interventions Quick Adds Therapeutic Activity   Therapeutic Activity   Therapeutic Activities: dynamic activities to improve functional performance Minutes (29800) 15   Symptoms Noted During/After Treatment None   Treatment Detail/Skilled Intervention Pt is anxious to get up and OOB, son is present and supportive. Worked on bed  mobility and sitting on EOB, supervision required occasionally leans post in sitting but able to correct with cues. Sit to stand with min assist 1, post lean but able to correct with cues, stood x 2 minutes with CGA to min assist 1. Slow transfer to w/c with small steps reaching for chair with L UE. Chair is wider than her chair at home but transfer went well. Pt up in chair with son present and chair alarm pad in place, RN gave ok for son to take pt for a walk around the unit, will put the light on when they get back for staff to plug in chair alarm.   PT Discharge Planning   PT Plan transfers, strengthening   PT Discharge Recommendation (DC Rec) home with home care physical therapy   PT Rationale for DC Rec Anticipate pt will be able to return to [facility] at discharge with assist as before (assist 1 for ADLs and transfers), rec course of home PT at [facility]. Pt transferred bed to w/c with min to mod assist 1 today.   PT Brief overview of current status Assist 1 transfer bed to w/. Goals of therapy will be to address safe mobility and make recs for d/c to next level of care. Pt and RN will continue to follow all falls risk precautions as documented by RN staff while hospitalized.   Total Session Time   Timed Code Treatment Minutes 15   Total Session Time (sum of timed and untimed services) 30

## 2024-08-20 NOTE — CONSULTS
[facility]    Infectious Disease Consultation     Date of Admission:  8/19/2024  Date of Consult : 08/20/24    Assessment:  89 YF with hypertension, hypothyroidism, depression, CKD stage III, MONCHO who has been admitted from the assisted care facility with weakness, confusion and fall, found to have E.coli bacteremia likely of urinary source    -E.coli bacteremia  of unclear source -likely from GI translocation with diarrheal episode. Unlikely urinary source since UA only shows 3WBCs not suggestive of infection  -Fever and leukocytosis related to blood stream infection  -Encephalopathy  -BENJAMIN superimposed on CKD, creatinine is better today  -Hypomagnesemia  -Chronic medical conditions -hypertension, hypothyroidism, depression, CKD stage III, MONCHO       Recommendations:  Follow urine cx  Maintain on Ceftriaxone  Once clinically improved and susceptibility data available , will plan transition to oral antibiotic therapy (by tomorrow) to complete treatment course      Tori Julien MD    Reason for Consult   Reason for consult: I was asked to evaluate this patient for E.coli bacteremia.    Primary Care Physician   Khadra Hernandez    Chief Complaint   Fever and leukocytosis    History is obtained from the patient and medical records    History of Present Illness   Katrina Portillo is a 89 year old female with hypertension, hypothyroidism, depression, CKD stage III, MONCHO who has been admitted from the assisted care facility with weakness, confusion and fall, found to have E.coli bacteremia likely of urinary source.    Patient was febrile to 101 degrees on admission with WBC of 24.1k. She had some BENJAMIN superimposed on CKD. Blood cxs turned positive for E.coli. UA shows only 3 WBCs not suggestive of infection and culture is pending. Patient received a dose of Zosyn, then was started on Ceftriaxone and ID has been asked to assist with further management.    Patient has no abdominal pain or urinary  symptoms, had some diarrhea, feels 100% better today  Past Medical History   I have reviewed this patient's medical history and updated it with pertinent information if needed.   No past medical history on file.    Past Surgical History   I have reviewed this patient's surgical history and updated it with pertinent information if needed.  Past Surgical History:   Procedure Laterality Date    OPEN REDUCTION INTERNAL FIXATION HIP NAILING Right 10/17/2021    Procedure: INTERNAL FIXATION, FRACTURE, TROCHANTERIC, HIP, NAILING;  Surgeon: Benjy Tirado MD;  Location:  OR       Prior to Admission Medications   Prior to Admission Medications   Prescriptions Last Dose Informant Patient Reported? Taking?   Vitamin D, Cholecalciferol, 25 MCG (1000 UT) TABS 8/19/2024 Daughter Yes Yes   Sig: Take 1 tablet by mouth daily   acetaminophen (TYLENOL) 500 MG tablet 8/19/2024  Yes Yes   Sig: Take 1,000 mg by mouth every morning   calcium carbonate (TUMS) 500 MG chewable tablet Unknown  No Yes   Sig: Take 1 tablet (500 mg) by mouth 3 times daily as needed for heartburn   levothyroxine (SYNTHROID/LEVOTHROID) 88 MCG tablet 8/19/2024 Daughter Yes Yes   Sig: Take 88 mcg by mouth daily   losartan (COZAAR) 25 MG tablet 8/19/2024  Yes Yes   Sig: Take 12.5 mg by mouth daily   metoprolol succinate ER (TOPROL-XL) 25 MG 24 hr tablet 8/19/2024 Daughter Yes Yes   Sig: Take 12.5 mg by mouth daily   nitroGLYcerin (NITROSTAT) 0.4 MG sublingual tablet Unknown Daughter Yes Yes   Sig: Place 0.4 mg under the tongue every 5 minutes as needed for chest pain For chest pain place 1 tablet under the tongue every 5 minutes for 3 doses. If symptoms persist 5 minutes after 1st dose call 911.   sertraline (ZOLOFT) 50 MG tablet 8/19/2024  Yes Yes   Sig: Take 50 mg by mouth daily      Facility-Administered Medications: None     Allergies   Allergies   Allergen Reactions    Procaine      Other reaction(s): Tachycardia    Tetanus Toxoids     Latex      Possible  allergy historically per patient    Tetanus Antitoxin      Other reaction(s): Edema  Arm doubled in size       Immunization History   Immunization History   Administered Date(s) Administered    COVID-19 12+ (2023-24) (Pfizer) 10/30/2023, 05/02/2024    COVID-19 Bivalent 12+ (Pfizer) 10/14/2022    COVID-19 MONOVALENT 12+ (Pfizer) 11/24/2021    COVID-19 Monovalent 18+ (Moderna) 01/26/2021, 02/22/2021, 05/25/2022       Social History   I have reviewed this patient's social history and updated it with pertinent information if needed. Katrina Portillo  reports that she has never smoked. She has never used smokeless tobacco. She reports that she does not drink alcohol and does not use drugs.    Family History   I have reviewed this patient's family history and updated it with pertinent information if needed.   No family history on file.    Review of Systems   The 10 point Review of Systems is as per HPI    Physical Exam   Temp: 98.5  F (36.9  C) Temp src: Oral BP: (!) 153/83 Pulse: 84   Resp: 16 SpO2: 97 % O2 Device: None (Room air)    Vital Signs with Ranges  Temp:  [98.5  F (36.9  C)-101  F (38.3  C)] 98.5  F (36.9  C)  Pulse:  [] 84  Resp:  [11-31] 16  BP: (128-160)/() 153/83  SpO2:  [90 %-99 %] 97 %  139 lbs 1.76 oz  Body mass index is 24.64 kg/m .    GENERAL APPEARANCE:  awake  EYES: Eyes grossly normal to inspection  NECK: no adenopathy  RESP: lungs clear   CV: regular rates and rhythm  ABDOMEN: soft, nontender  MS: extremities normal  SKIN: no suspicious lesions or rashes        Data   All laboratory data reviewed       Component      Latest Ref Rng 8/20/2024  5:19 AM   WBC      4.0 - 11.0 10e3/uL 16.3 (H)    RBC Count      3.80 - 5.20 10e6/uL 3.40 (L)    Hemoglobin      11.7 - 15.7 g/dL 10.6 (L)    Hematocrit      35.0 - 47.0 % 32.6 (L)    MCV      78 - 100 fL 96    MCH      26.5 - 33.0 pg 31.2    MCHC      31.5 - 36.5 g/dL 32.5    RDW      10.0 - 15.0 % 13.5    Platelet Count      150 - 450 10e3/uL  160    NRBCs per 100 WBC      <1 /100 0    Absolute NRBCs      10e3/uL 0.0    Sodium      135 - 145 mmol/L 136    Potassium      3.4 - 5.3 mmol/L 4.5    Chloride      98 - 107 mmol/L 105    Carbon Dioxide (CO2)      22 - 29 mmol/L 20 (L)    Anion Gap      7 - 15 mmol/L 11    Urea Nitrogen      8.0 - 23.0 mg/dL 33.4 (H)    Creatinine      0.51 - 0.95 mg/dL 1.01 (H)    GFR Estimate      >60 mL/min/1.73m2 51 (L)    Calcium      8.8 - 10.4 mg/dL 8.2 (L)    Glucose      70 - 99 mg/dL 91       Component      Latest Ref Rng 8/19/2024  12:41 PM   Influenza A      Negative  Negative    Influenza B      Negative  Negative    Resp Syncytial Virus      Negative  Negative    SARS CoV2 PCR      Negative  Negative        Microbiology  08/19/2024 1405 08/20/2024 0436 Blood Culture Peripheral Blood [[id number]]   (Abnormal)   Peripheral Blood    Preliminary result Component Value   Culture Positive on the 1st day of incubation Abnormal  P    Gram negative bacilli Panic  P    2 of 2 bottles             08/19/2024 1405 08/20/2024 0454 Verigene GN Panel [[id number]]    (Abnormal)   Peripheral Blood    Final result Component Value   Acinetobacter species Not Detected   Citrobacter species Not Detected   Enterobacter species Not Detected   Proteus species Not Detected   Escherichia coli Detected Abnormal    Positive for Escherichia coli by Verigene multiplex nucleic acid test. Final identification and antimicrobial susceptibility testing will be verified by standard methods. Verigene test will not distinguish E. coli from Shigella species including Shigella dysenteriae, Shigella flexneri, Shigella boydii, and Shigella sonnei. Specimens containing Shigella species or E. coli will be reported as positive for E. coli.   Klebsiella pneumoniae Not Detected   Klebsiella oxytoca Not Detected   Pseudomonas aeruginosa Not Detected   CTX-M Not Detected   KPC Not Detected   NDM Not Detected   VIM Not Detected   IMP Not Detected   OXA Not  Detected

## 2024-08-20 NOTE — PROGRESS NOTES
[facility]    Medicine Progress Note - Hospitalist Service    Date of Admission:  8/19/2024    Assessment & Plan     Katrina Portillo is a 89 year old female with medical history significant for hypertension, hypothyroidism, depression, CKD stage III, MONCHO who resides at assisted living facility was brought to the ER by EMS for evaluation of generalized weakness, confusion and fall, found to have a possible UTI with sepsis and admitted on 8/19/2024.       Gram negative UTI  Gram negative bacteremia and Sepsis due to above  Denies urinary symptoms.  No respiratory symptoms.  Noted marked leukocytosis.  Lactic acid not elevated.  No nausea vomiting, abdominal pain or diarrhea.  Noted UA with 3 pus cells and positive for nitrite.  COVID-19, influenza A and B and RSV pending.  Chest x-ray without pneumonia.    -IV Zosyn in ER, continued on IV Rocephin 2 g IV daily on admission  -IV hydration through today.  -Urine and blood culture grew gram-negative rods, pending identification and sensitivity  -ID consulted, appreciate assistance.  Plan is p.o. antibiotic eventually at discharge if sensitive.     Generalized weakness and fall  Possible mild acute encephalopathy due to infection  Likely related to above.  Patient also appears somewhat dehydrated.  Reported being dizzy on admission.  CT head negative for acute intracranial process.  Patient denies any bone or joint pain.  -IV hydration, treat infection as above.  -Patient is well oriented today.  - If agitated, will order low dose seroquel.   - PT, OT eval.  Social work consult for discharge planning.  Patient lives at assisted living facility.     Hypertension.   Sinus tachycardia  Elevated troponin, likely type II MI due to sepsis  Heart rate in 110-120s, could be due to sepsis and or dehydration.  Reports being dizzy.  No chest pain.  On beta-blocker.  - Continue PTA medications including losartan and metoprolol  -Normal TSH  -IV hydration  as above.  Tachycardia has resolved  -Noted troponin 46-59, TTE ordered and pending.  Patient denies chest pain.     Hypothyroidism  -Resumed PTA Synthroid.  TSH normal     Depression  -Resumed PTA sertraline     CKD stage III  -Creatinine at presentation 1.12 which is her baseline      MONCHO     Heart murmur   -noted          Diet: Combination Diet Regular Diet Adult  Snacks/Supplements Adult: Ensure Enlive; With Meals    DVT Prophylaxis: Pneumatic Compression Devices  Mendoza Catheter: Not present  Lines: None     Cardiac Monitoring: ACTIVE order. Indication: sepsis  Code Status: No CPR- Do NOT Intubate      Clinically Significant Risk Factors Present on Admission          # Hypocalcemia: Lowest Ca = 8.2 mg/dL in last 2 days, will monitor and replace as appropriate   # Hypomagnesemia: Lowest Mg = 1.6 mg/dL in last 2 days, will replace as needed       # Hypertension: Home medication list includes antihypertensive(s)    # Anemia: based on hgb <11                    Disposition Plan     Medically Ready for Discharge: Anticipated Tomorrow likely 1-2 more days pending.  Discussed with patient's daughter and over the phone.       Zan Price MD  Hospitalist Service  [facility]  Securely message with Mobile Factorymore info)  Text page via [employer] Paging/Directory   ______________________________________________________________________    Interval History     Chart reviewed, discussed with nursing staff and patient was seen this morning.  She feels much better today.  Fever of  >101 yesterday late afternoon, afebrile since then.  Denies flank pain or dysuria.  Noted blood culture grew gram-negative bacilli.  Urine culture also grew gram-negative bacilli.  Identification is pending.    Physical Exam   Vital Signs: Temp: 98.5  F (36.9  C) Temp src: Oral BP: (!) 153/83 Pulse: 84   Resp: 16 SpO2: 97 % O2 Device: None (Room air)    Weight: 139 lbs 1.76 oz    General: AAOx3, appears comfortable.  HEENT:  PERRLA EOMI. Mucosa moist  Lungs: Bilateral equal air entry. Clear to auscultation, normal work of breathing.   CVS: S1S2 regular, tachycardic, systolic murmur present.   Abdomen: Soft, NT, ND. BS heard.  MSK: No edema or deformities.  Neuro: Alert awake, knows she is in hospital, was able to correctly tell me the month and day of her birth date. CN 2-12 normal. Strength symmetrical.  Skin: No rash    Medical Decision Making       40 MINUTES SPENT BY ME on the date of service doing chart review, history, exam, documentation & further activities per the note.      Data     I have personally reviewed the following data over the past 24 hrs:    16.3 (H)  \   10.6 (L)   / 160     136 105 33.4 (H) /  91   4.5 20 (L) 1.01 (H) \     ALT: 25 AST: 24 AP: 145 TBILI: 0.5   ALB: 3.9 TOT PROTEIN: 7.8 LIPASE: N/A     Trop: 59 (H) BNP: N/A     Procal: N/A CRP: N/A Lactic Acid: 0.7         Imaging results reviewed over the past 24 hrs:   Recent Results (from the past 24 hour(s))   CT Head w/o Contrast    Narrative    CT SCAN OF THE HEAD WITHOUT CONTRAST   8/19/2024 1:39 PM     HISTORY: falls, confusion    TECHNIQUE:  Axial images of the head and coronal reformations without  IV contrast material. Radiation dose for this scan was reduced using  automated exposure control, adjustment of the mA and/or kV according  to patient size, or iterative reconstruction technique.    COMPARISON: None.    FINDINGS: There is no evidence of intracranial hemorrhage, mass, acute  infarct or anomaly. Moderate generalized parenchymal volume loss with  associated ex vacuo dilatation of the ventricles. Confluent  periventricular white matter hypodensities which are nonspecific, but  likely related to advanced chronic microvascular ischemic disease.     The visualized portions of the sinuses and mastoids appear normal. The  bony calvarium and bones of the skull base appear intact.       Impression    IMPRESSION:     1. No evidence of acute intracranial  hemorrhage, mass, or herniation.  2. Diffuse parenchymal volume loss and white matter changes likely due  to chronic microvascular ischemic change.      DONG ARAUJO MD         SYSTEM ID:  [id number]   XR Chest 2 Views    Narrative    CHEST TWO VIEWS  8/19/2024 1:46 PM     HISTORY: Weakness, falls, WBC 24k.    COMPARISON: October 16, 2021       Impression    IMPRESSION: No definite consolidation. No effusion or pneumothorax  demonstrated. The cardiac silhouette is not enlarged. Pulmonary  vasculature is unremarkable.     KATRHINE STOKES MD         SYSTEM ID:  [id number]

## 2024-08-20 NOTE — PLAN OF CARE
Summary: [id number]  Primary Diagnosis: Weakness     Orientation: A&O x4, confused at times  Aggression Stop Light: Green  Activity: Turns in bed  Diet/BS Checks: Reqular  Tele:    IV Access/Drains: PIV, NS infusing at 100mls/hr  Pain Management: Tylenol x1 given  Abnormal VS/Results: Elevated BP, Temp and HR, resolved after intervention  Bowel/Bladder: Incontinent, purewick in place  Skin/Wounds: Redness to coccyx area, mepilex applied  Consults: PT/OT, SW  D/C Disposition: Pending

## 2024-08-20 NOTE — PROGRESS NOTES
[id number]    Orientation: A&O x4, confused at times  Aggression Stop Light: Green  Activity: T/R q2h. Not OOB to assess activity   Diet/BS Checks: Regular  IV Access/Drains: PIV R FA NS infusing at 100mls/hr  Pain Management: Denies  Abnormal VS/Results: VSS on RA  Bowel/Bladder: Incontinent, purewick in place  Skin/Wounds: Redness to coccyx area, mepilex applied  Consults: PT/OT, SW  D/C Disposition: Pending  Other Info:

## 2024-08-20 NOTE — PROVIDER NOTIFICATION
MD Notification    Notified Person: MD    Notified Person Name:  Tammy    Notification Date/Time: 0335 on 8/20/24    Notification Interaction: Vcoera    Purpose of Notification:   Critical Lab: Blood Cultures drawn on 8/19 came back with gram - bacilli    Addend: Update to the Verigene panel. Came back with E coli    Orders Received: Wait for Verigene panel    Addend: thanks for letting me know. continue ceftriaxone     Comments:

## 2024-08-20 NOTE — PROGRESS NOTES
08/20/24 1014   Appointment Info   Signing Clinician's Name / Credentials (OT) Haleigh Kruse, OTR/L   Living Environment   Current Living Arrangements assisted living  (Memory care)   Home Accessibility no concerns   Transportation Anticipated family or friend will provide   Living Environment Comments Pt reports she lives at [facility] in [place], her kids provide transport, she has own apartment, walk-in shower with short ledge, grab bars, high toilet stools with grab bars,   Self-Care   Usual Activity Tolerance good   Current Activity Tolerance moderate   Equipment Currently Used at Home commode chair;dressing device;grab bar, toilet;grab bar, tub/shower;raised toilet seat;shower chair;walker, sydni;wheelchair, power;other (see comments)  (Also power scooter w/handlebars, 4ww, reacher)   Fall history within last six months yes   Number of times patient has fallen within last six months   (Per chart, fall at admission (pt doesnt recall but she stated she sometimes slides out of her wc))   Activity/Exercise/Self-Care Comment Per pt report who does exhibit impaired recall (and lives in [facility]): she has supervision of 1 person with basic tasks but can do on own including grooming (while seated), UB dressing, LB dressing then they A with standing to pull up clothing, they A with all transfers, she then can bathe herself seated, toilet herself, propels herself around facility in power wc or scooter, only uses 4ww for transfers sometimes. All meals, meds provided.   General Information   Onset of Illness/Injury or Date of Surgery 08/19/24   Referring Physician Zan Price MD   Patient/Family Therapy Goal Statement (OT) return home w/continued A and on-site therapiesd   Additional Occupational Profile Info/Pertinent History of Current Problem From chart: 89 year old female with medical history significant for hypertension, hypothyroidism, depression, CKD stage III, MONCHO who resides at assisted living facility was  brought to the ER by EMS for evaluation of generalized weakness, confusion and fall, found to have a possible UTI with sepsis and admitted on 8/19/2024.   Existing Precautions/Restrictions fall   Limitations/Impairments safety/cognitive   Cognitive Status Examination   Orientation Status person;place   Affect/Mental Status (Cognitive) WFL   Follows Commands does not follow one-step commands;over 90% accuracy   Memory Deficit recall, biographical information;short-term memory;moderate deficit;other (see comments)  (not formally or fully assessed)   Visual Perception   Visual Impairment/Limitations WFL;corrective lenses full-time   Pain Assessment   Patient Currently in Pain No   Range of Motion Comprehensive   General Range of Motion no range of motion deficits identified   Strength Comprehensive (MMT)   General Manual Muscle Testing (MMT) Assessment no strength deficits identified   Muscle Tone Assessment   Muscle Tone Quick Adds No deficits were identified   Coordination   Upper Extremity Coordination No deficits were identified   Bed Mobility   Comment (Bed Mobility) Sup;ine to sit   Transfers   Transfers sit-stand transfer;bed-chair transfer   Transfer Skill: Bed to Chair/Chair to Bed   Bed-Chair Stoddard (Transfers) moderate assist (50% patient effort);other (see comments)   Transfer Comments pt reports much easier at home as her wc positioned at bedside and she does low pivot/reach transfer with CGA to SBA   Sit-Stand Transfer   Sit-Stand Stoddard (Transfers) moderate assist (50% patient effort)   Balance   Balance Comments Min A static and dynamic sitting as leans R and posteriorly which she states is baseline d/t previous L hip fx; Min-Mod A static standing, Min A small stepping laterally using walker   Activities of Daily Living   BADL Assessment/Intervention lower body dressing;grooming;toileting   Clinical Impression   Criteria for Skilled Therapeutic Interventions Met (OT) Yes, treatment indicated  "  OT Diagnosis decline in ADL performance   OT Problem List-Impairments impacting ADL problems related to;activity tolerance impaired;balance;cognition;strength;other (see comments)  (environmental set-up not as easy as hers in home setting)   Assessment of Occupational Performance 3-5 Performance Deficits   Identified Performance Deficits functional mob, dressing, toileting, bathing   Planned Therapy Interventions (OT) ADL retraining;transfer training   Clinical Decision Making Complexity (OT) problem focused assessment/low complexity   Risk & Benefits of therapy have been explained evaluation/treatment results reviewed;care plan/treatment goals reviewed;risks/benefits reviewed;current/potential barriers reviewed;participants voiced agreement with care plan;participants included;patient   Clinical Impression Comments No caregivers present and pt has impaired recall/memory so will need reinforcement of any new learning and OT POC   OT Total Evaluation Time   OT Eval, Low Complexity Minutes (71684) 15   OT Goals   Therapy Frequency (OT) Daily   OT Predicted Duration/Target Date for Goal Attainment 24   OT Goals Hygiene/Grooming;Upper Body Dressing;Lower Body Dressing;Toilet Transfer/Toileting   Interventions   Interventions Quick Adds Self-Care/Home Management;Therapeutic Activity   Self-Care/Home Management   Self-Care/Home Mgmt/ADL, Compensatory, Meal Prep Minutes (87107) 24   Treatment Detail/Skilled Intervention OT: time includes retrieval of needed supplies including walker, ADL supplies, gait belt and management of equipment/lines.  Pt oriented to self, , year,  one month off  (\"July\"), stated POTUS was \"Trump\" but when asked to correct she stated \"Biden\". She did follow 1-step directives 100% of the time however required repetition of information including explanation of why her own wc and walker not here. Seated EOB with sitting balance support she was able to perform oral-facial grooming/hyg tasks " "with set-up independently including hair combing, oral-care, face wash sequencing and completing thoroughly without cueing. She donned and doffed her socks and shoes with very minimal assist, was able to get clothing over feet then needed Mod A in standing to pull up. She reported she sometimes does that on her own from wc just \"raising my behind up a little to pull them up\" allowing her to toilet indep at times. This did appear to be the case in chart notes.   Therapeutic Activities   Therapeutic Activity Minutes (84076) 10   Treatment Detail/Skilled Intervention Pt given general command to sit up at EOB but then required more specific cues and Mod A for supine to sit (with HOB elevated to approx 45o) and to scoot toEOB.  Pt leaning R and posteriorly when sitting requiring Min A to maintain midline which she reports is her baseline, she did not fully lose balance in either direction but it did effect her ability to perform tasks. She stated she does all tasks from wc level however where sitting balance supported. In this matter (seated EOB w/A for balance) she did perform ADL tasks as noted above. At completion, performed sit-stand using FWW which she did not like and is not used to, requiring repetitive verbal cueing for safe hand placement and Min-Mod A for sit-stand from EOB. Again, pt leaning posteriorally with flexed posture requiring verbal and tactile cueing for upright posture, weight shift forward to improve WES and once improved Min A for static balance using walker but could not sustain. Able to take lateral tasks with Min-Mod A, turns/standing pivot with Mod A of 1. Pt did not want to sit in recliner due to low height perseverating on wanting her own wc. OT had explained prior that not at hospital and was most assuredly safe in her apartment. She explained how much easier her transfers would be with use of her own equipment.   OT Discharge Planning   OT Plan T/F to commode or wc with walker and A of 1, " G/H from wc, UB dressing in wc   OT Discharge Recommendation (DC Rec) home with assist;home with home care occupational therapy   OT Rationale for DC Rec Pt near her baseline level of function and with continued medical mgmt and inpatient therapy should be able to return to her snf with A of 1 caregiver for transfers and self-cares and  OT to advance strength, endurance and maximal indep with self-cares.   OT Brief overview of current status Mod A of 1 with sup-sit, Min A LB dressing, Mod A of 1 bed<>chair.   Total Session Time   Timed Code Treatment Minutes 34   Total Session Time (sum of timed and untimed services) 49

## 2024-08-20 NOTE — PLAN OF CARE
Goal Outcome Evaluation:    Orientation: AO x3, d/o to time and forgetful  Aggression Stop Light: Green  Activity: A1 GB+W, up to W/C during the day  Diet/BS Checks: Reg w/ Ensure  Tele:  NSR, CTM  IV Access/Drains: New L PIV inf NS SL  Pain Management: Denies  Abnormal VS/Results: VSS on RA except HTN  Bowel/Bladder: Incont b/b  Skin/Wounds: Intact, some redness to sacrum and baltazar  Consults: SW/CC, OT, PT, ID  D/C Disposition: Back to [facility] pending eval  Other Info: Neuros intact. Endorses feeling better. No acute events today.

## 2024-08-20 NOTE — SIGNIFICANT EVENT
Significant Event Note    Time of event: 3:37 AM August 20, 2024    Description of event:  Paged and informed blood cultures are positive for GNR    Plan:  Ceftriaxone continued pending Verigene results as we continue to monitor very closely overnight    Discussed with: bedside nurse    Miki Munoz MD

## 2024-08-21 ENCOUNTER — HOME INFUSION (PRE-WILLOW HOME INFUSION) (OUTPATIENT)
Dept: PHARMACY | Facility: CLINIC | Age: 89
End: 2024-08-21
Payer: COMMERCIAL

## 2024-08-21 ENCOUNTER — APPOINTMENT (OUTPATIENT)
Dept: PHYSICAL THERAPY | Facility: CLINIC | Age: 89
DRG: 871 | End: 2024-08-21
Payer: COMMERCIAL

## 2024-08-21 DIAGNOSIS — I35.0 NONRHEUMATIC AORTIC VALVE STENOSIS: Primary | ICD-10-CM

## 2024-08-21 LAB
BASOPHILS # BLD MANUAL: 0 10E3/UL (ref 0–0.2)
BASOPHILS NFR BLD MANUAL: 0 %
EOSINOPHIL # BLD MANUAL: 0 10E3/UL (ref 0–0.7)
EOSINOPHIL NFR BLD MANUAL: 0 %
ERYTHROCYTE [DISTWIDTH] IN BLOOD BY AUTOMATED COUNT: 13.4 % (ref 10–15)
HCT VFR BLD AUTO: 34.5 % (ref 35–47)
HGB BLD-MCNC: 11.5 G/DL (ref 11.7–15.7)
LYMPHOCYTES # BLD MANUAL: 1.6 10E3/UL (ref 0.8–5.3)
LYMPHOCYTES NFR BLD MANUAL: 11 %
MAGNESIUM SERPL-MCNC: 1.8 MG/DL (ref 1.7–2.3)
MCH RBC QN AUTO: 31.6 PG (ref 26.5–33)
MCHC RBC AUTO-ENTMCNC: 33.3 G/DL (ref 31.5–36.5)
MCV RBC AUTO: 95 FL (ref 78–100)
MONOCYTES # BLD MANUAL: 0.1 10E3/UL (ref 0–1.3)
MONOCYTES NFR BLD MANUAL: 1 %
NEUTROPHILS # BLD MANUAL: 13.1 10E3/UL (ref 1.6–8.3)
NEUTROPHILS NFR BLD MANUAL: 88 %
NRBC # BLD AUTO: 0 10E3/UL
NRBC BLD AUTO-RTO: 0 /100
PLAT MORPH BLD: ABNORMAL
PLATELET # BLD AUTO: 175 10E3/UL (ref 150–450)
POTASSIUM SERPL-SCNC: 4.6 MMOL/L (ref 3.4–5.3)
RBC # BLD AUTO: 3.64 10E6/UL (ref 3.8–5.2)
RBC MORPH BLD: ABNORMAL
WBC # BLD AUTO: 14.9 10E3/UL (ref 4–11)

## 2024-08-21 PROCEDURE — 99232 SBSQ HOSP IP/OBS MODERATE 35: CPT | Performed by: SPECIALIST

## 2024-08-21 PROCEDURE — 97530 THERAPEUTIC ACTIVITIES: CPT | Mod: GP

## 2024-08-21 PROCEDURE — 85007 BL SMEAR W/DIFF WBC COUNT: CPT | Performed by: HOSPITALIST

## 2024-08-21 PROCEDURE — 250N000013 HC RX MED GY IP 250 OP 250 PS 637: Performed by: HOSPITALIST

## 2024-08-21 PROCEDURE — 120N000001 HC R&B MED SURG/OB

## 2024-08-21 PROCEDURE — 83735 ASSAY OF MAGNESIUM: CPT | Performed by: HOSPITALIST

## 2024-08-21 PROCEDURE — 99222 1ST HOSP IP/OBS MODERATE 55: CPT | Performed by: INTERNAL MEDICINE

## 2024-08-21 PROCEDURE — 84132 ASSAY OF SERUM POTASSIUM: CPT | Performed by: HOSPITALIST

## 2024-08-21 PROCEDURE — 85027 COMPLETE CBC AUTOMATED: CPT | Performed by: HOSPITALIST

## 2024-08-21 PROCEDURE — 99232 SBSQ HOSP IP/OBS MODERATE 35: CPT | Performed by: HOSPITALIST

## 2024-08-21 PROCEDURE — 250N000011 HC RX IP 250 OP 636: Performed by: HOSPITALIST

## 2024-08-21 PROCEDURE — 36415 COLL VENOUS BLD VENIPUNCTURE: CPT | Performed by: HOSPITALIST

## 2024-08-21 RX ORDER — SULFAMETHOXAZOLE/TRIMETHOPRIM 800-160 MG
1 TABLET ORAL 2 TIMES DAILY
Qty: 14 TABLET | Refills: 0 | Status: SHIPPED | OUTPATIENT
Start: 2024-08-22 | End: 2024-08-21

## 2024-08-21 RX ADMIN — FAMOTIDINE 20 MG: 20 TABLET, FILM COATED ORAL at 08:02

## 2024-08-21 RX ADMIN — METOPROLOL SUCCINATE 12.5 MG: 25 TABLET, EXTENDED RELEASE ORAL at 08:02

## 2024-08-21 RX ADMIN — LEVOTHYROXINE SODIUM 88 MCG: 88 TABLET ORAL at 06:55

## 2024-08-21 RX ADMIN — SERTRALINE HYDROCHLORIDE 50 MG: 50 TABLET ORAL at 08:02

## 2024-08-21 RX ADMIN — LOSARTAN POTASSIUM 12.5 MG: 25 TABLET, FILM COATED ORAL at 08:00

## 2024-08-21 RX ADMIN — Medication 25 MCG: at 08:02

## 2024-08-21 RX ADMIN — CEFTRIAXONE SODIUM 2 G: 2 INJECTION, POWDER, FOR SOLUTION INTRAMUSCULAR; INTRAVENOUS at 17:22

## 2024-08-21 ASSESSMENT — ACTIVITIES OF DAILY LIVING (ADL)
ADLS_ACUITY_SCORE: 40

## 2024-08-21 NOTE — CONSULTS
"Care Management Discharge Note    Discharge Date: 08/21/2024       Discharge Disposition: Assisted Living, Home Care RN/PT/OT (per Daughter Danika she is preferring the in home [facility] homecare PT/OT [facility] for RN.)    Discharge Services: None    Discharge DME: None    Discharge Transportation: family or friend will provide    Private pay costs discussed: Not applicable    Does the patient's insurance plan have a 3 day qualifying hospital stay waiver?  No    PAS Confirmation Code:  n/a  Patient/family educated on Medicare website which has current facility and service quality ratings:  no    Education Provided on the Discharge Plan:  yes  Persons Notified of Discharge Plans: Bedside RN, RN at [facility] phone [phone] fax [phone] and Daughter Danika by phone   Patient/Family in Agreement with the Plan: yes    Handoff Referral Completed: No    Additional Information:  Writer received update from bedside RN that patient is most likely ready for discharge back to [facility] with homecare today.  Patients Daughter Danika was on the phone and wanted an update.  Writer informed Danika that there was a note stating [facility] was to do an on site assessment for return to facility. Danika informs this writer that she will be picking her mother up at 15:00 and that she will work with the [facility] to determine what \"al a cart\" services will need to be added if needed.  Danika noted she was in communication with [facility] 8/20 and that she prefers to use the homecare agency that the [facility] uses.  Writer called and talked to RN at [facility]. Shwetha was under the impression that the patient would NOT be discharging until 8/22 and that they were coming to do a on site assessment.  Writer gave Trini update that the patient for the last few shifts has been up with RN and Staff with A1 GB/Walker up to wheelchair and went over the PT/OT notes from 8/20.  Per Shwetha she is okay with patient returning and will work with patient and Daughter if they want " Statement Selected additional services added.  Writer asked [facility] about homecare she noted they will handle the homecare and will need the orders and face to face.  Writer updated RN with the above plans and  time.  Writer will fax orders when available for discharge.  No further needs identified.  Addendum: 8/21 12:20  Per text from the Charge patient may need IV antx pending final reccs. Writer will await an update will need to place an email for possible home infusion vs outpatient infusion if Q24H. Will provide update to [facility]/Daughter when available.   Addendum: 8/21 13:07  Per rounding provider possibility that the patient may require IV antx Q24H writer left voicemail to Daughter Danika that discharge may be delayed left callback number. Also called [facility] and left a message for Francine [phone] (number for [facility] continues to ring [phone]). Care Transitions will continue to follow for further discharge orders.  8/21 Addendum: 15:25  Writer confirmed with ID that they are awaiting final for either PO or IV. Writer met outside of the room with Saad Garnica she is aware that if IV antx are required at discharge they can privately pay for IV antx if family is willing to learn, or we would need to find an outpatient infusion center through [facility] (héctor/kim) or outside (frazier). Daughter Danika noted that she would be willing to learn and is aware of private pay cost ~34/day and skilled RN for dressing change/lab covered if homebound. Danika is aware that once we have final reccs this writer will call for time to coordinate possible IV teach with [facility] Lia[facility]. Updated [facility]ia[facility] by email.       Norma Corey RN, BSN, ACM   Care Transitions Specialist  [facility]  Care Transitions Specialist  [address] [id number]  [email or web address]  Office: [phone] Fax: [phone]  [facility]

## 2024-08-21 NOTE — PROGRESS NOTES
[facility]    Received request for benefit check should pt require home IV abx. Pt does not have coverage for IV abx with their [employer] plan. Drug would be billed to the part D and supplies would be self-pay.     Based on Ceftriaxone 2gm q24h, total cost is $34.05/day for drug and supplies.     [facility] has no line preference.     For nursing, patient should have coverage if homebound, however [facility] is not contracted with Medicare and an outside nursing agency would be utilized instead. If patient is not homebound, there is no coverage and [facility] can see patient if patient agrees to self-pay for $90 per visit.    Thank you    Izabela Estrada RN  [facility] Liaison  [phone] (Mon thru Fri 8am - 5pm)  [phone] Office

## 2024-08-21 NOTE — CONSULTS
"Cardiology Consultation      Katrina Portillo MRN# [id number]   YOB: 1929 Age: 89 year old   Date of Admission: 2024     Reason for consult:            Assessment and Plan:   Active Problems:     Moderate-Severe Aortic stenosis    Asymptomatic at this point  GERONIMO, DI and gradient all in the moderate-severe range  Recommend follow up in TAVR clinic with repeat echo in 6 months  No symptoms of heart failure, syncope, or chest pain  CXR negative for PVC  Call with further questions - will sign off.       Sepsis  Causing mild tachycardia        Normal LVEF          Fractured Hip  Mostly wheelchair bound        CKD  Depression  MONCHO                 Chief Complaint:   Generalized Weakness and Fever           History of Present Illness:   This patient is a 89 year old female admitted with generalized weakness and fever.  Due to heart murmur echocardiogram obtained showing moderate-severe aortic stenosis and cardiology consulted.  Patient with long standing history of mumur but no history of CHF, MI, or arrythmia.  Patient denies chest pain, SOB, syncope or near syncope.  Patient mostly wheelchair bound as had hip fracture and orthpedics does not want to operate secondary to age.     Lives in assisted living, does some exercises, without cardiac limitations.  Patient is interested in valve replacement if it is recommended.  Admitted with sepsis likely secondary to UTI.  Also has generalized weakness, mild acute encephalopathy secondary to metabolic condition.  Vitals stable with HR 90's on beta blockers.  Echo reviewed and agree moderate-severe aortic stenosis.          Physical Exam:   Vitals were reviewed  Blood pressure (!) 132/90, pulse 92, temperature 98.6  F (37  C), temperature source Oral, resp. rate 16, height 1.6 m (5' 3\"), weight 65.9 kg (145 lb 4.5 oz), SpO2 98%.  Temperatures:  Current - Temp: 98.6  F (37  C); Max - Temp  Av.5  F (36.9  C)  Min: 98  F (36.7  C)  Max: 99  F (37.2 "  C)  Respiration range: Resp  Av  Min: 16  Max: 16  Pulse range: Pulse  Av.5  Min: 79  Max: 95  Blood pressure range: Systolic (24hrs), Avg:160 , Min:132 , Max:185   ; Diastolic (24hrs), Av, Min:90, Max:121    Pulse oximetry range: SpO2  Av.7 %  Min: 97 %  Max: 98 %    Intake/Output Summary (Last 24 hours) at 2024 1500  Last data filed at 2024 1027  Gross per 24 hour   Intake 240 ml   Output --   Net 240 ml     Constitutional:   awake, alert, cooperative, no apparent distress, and appears stated age     Eyes:   Lids and lashes normal, pupils equal, round and reactive to light, extra ocular muscles intact, sclera clear, conjunctiva normal     Neck:   supple, symmetrical, trachea midline, no JVD     Back:   symmetric     Lungs:   No increased work of breathing, good air exchange, clear to auscultation bilaterally, no crackles or wheezing  clear to auscultation     Cardiovascular:   Normal apical impulse, regular rate and rhythm, normal S1 and S2, no S3 or S4, and no murmur noted.   , regular rate and rhythm, murmurs include systolic murmur III/VI located at left upper sternal border and left lower sternal border with radiation to the carotids, pulses 2 plus all extermities bilaterally       Abdomen:   non-tender     Musculoskeletal:   motor strength is 5 out of 5 all extremities bilaterally     Neurologic:   Grossly nonfocal     Skin:   no bruising or bleeding     Additional findings:   Edema   No edema                 Past Medical History:   I have reviewed this patient's past medical history  No past medical history on file.          Past Surgical History:   I have reviewed this patient's past surgical history  Past Surgical History:   Procedure Laterality Date    OPEN REDUCTION INTERNAL FIXATION HIP NAILING Right 10/17/2021    Procedure: INTERNAL FIXATION, FRACTURE, TROCHANTERIC, HIP, NAILING;  Surgeon: Benjy Tirado MD;  Location:  OR               Social History:   I have reviewed  this patient's social history  Social History     Tobacco Use    Smoking status: Never    Smokeless tobacco: Never   Substance Use Topics    Alcohol use: Never             Family History:   I have reviewed this patient's family history  No family history on file.          Allergies:     Allergies   Allergen Reactions    Procaine      Other reaction(s): Tachycardia    Tetanus Toxoids     Latex      Possible allergy historically per patient    Tetanus Antitoxin      Other reaction(s): Edema  Arm doubled in size             Medications:   I have reviewed this patient's current medications  Medications Prior to Admission   Medication Sig Dispense Refill Last Dose    acetaminophen (TYLENOL) 500 MG tablet Take 1,000 mg by mouth every morning   8/19/2024    calcium carbonate (TUMS) 500 MG chewable tablet Take 1 tablet (500 mg) by mouth 3 times daily as needed for heartburn   Unknown    levothyroxine (SYNTHROID/LEVOTHROID) 88 MCG tablet Take 88 mcg by mouth daily   8/19/2024    losartan (COZAAR) 25 MG tablet Take 12.5 mg by mouth daily   8/19/2024    metoprolol succinate ER (TOPROL-XL) 25 MG 24 hr tablet Take 12.5 mg by mouth daily   8/19/2024    nitroGLYcerin (NITROSTAT) 0.4 MG sublingual tablet Place 0.4 mg under the tongue every 5 minutes as needed for chest pain For chest pain place 1 tablet under the tongue every 5 minutes for 3 doses. If symptoms persist 5 minutes after 1st dose call 911.   Unknown    sertraline (ZOLOFT) 50 MG tablet Take 50 mg by mouth daily   8/19/2024    Vitamin D, Cholecalciferol, 25 MCG (1000 UT) TABS Take 1 tablet by mouth daily   8/19/2024     Current Facility-Administered Medications   Medication Dose Route Frequency Provider Last Rate Last Admin    acetaminophen (TYLENOL) tablet 650 mg  650 mg Oral Q4H PRN Zan Price MD   650 mg at 08/19/24 1731    Or    acetaminophen (TYLENOL) Suppository 650 mg  650 mg Rectal Q4H PRN Zan Price MD        calcium carbonate (TUMS) chewable  tablet 1,000 mg  1,000 mg Oral 4x Daily PRN Zan Price MD        cefTRIAXone (ROCEPHIN) 2 g vial to attach to  ml bag for ADULTS or NS 50 ml bag for PEDS  2 g Intravenous Q24H Zan Price MD   2 g at 08/20/24 1802    famotidine (PEPCID) tablet 20 mg  20 mg Oral Daily Zan Price MD   20 mg at 08/21/24 0802    hydrALAZINE (APRESOLINE) injection 5 mg  5 mg Intravenous Q2H PRN Zan Price MD   5 mg at 08/19/24 1731    labetalol (NORMODYNE/TRANDATE) injection 10 mg  10 mg Intravenous Q2H PRN Zan Price MD        levothyroxine (SYNTHROID/LEVOTHROID) tablet 88 mcg  88 mcg Oral Daily Zan Price MD   88 mcg at 08/21/24 0655    lidocaine (LMX4) cream   Topical Q1H PRN Zan Price MD        lidocaine 1 % 0.1-1 mL  0.1-1 mL Other Q1H PRN Zan Price MD        losartan (COZAAR) half-tab 12.5 mg  12.5 mg Oral Daily Zan Price MD   12.5 mg at 08/21/24 0800    metoprolol succinate ER (TOPROL-XL) 24 hr half-tab 12.5 mg  12.5 mg Oral Daily Zan Price MD   12.5 mg at 08/21/24 0802    nitroGLYcerin (NITROSTAT) sublingual tablet 0.4 mg  0.4 mg Sublingual Q5 Min PRN Zan Price MD        ondansetron (ZOFRAN ODT) ODT tab 4 mg  4 mg Oral Q6H PRN Zan Price MD        Or    ondansetron (ZOFRAN) injection 4 mg  4 mg Intravenous Q6H PRN Zan Price MD        senna-docusate (SENOKOT-S/PERICOLACE) 8.6-50 MG per tablet 1 tablet  1 tablet Oral BID PRN Zan Price MD        Or    senna-docusate (SENOKOT-S/PERICOLACE) 8.6-50 MG per tablet 2 tablet  2 tablet Oral BID PRN Zan Price MD        sertraline (ZOLOFT) tablet 50 mg  50 mg Oral Daily Zan Price MD   50 mg at 08/21/24 0802    sodium chloride (PF) 0.9% PF flush 3 mL  3 mL Intracatheter Q8H Zan Price MD   3 mL at 08/21/24 1028    sodium chloride (PF) 0.9% PF flush 3 mL  3 mL Intracatheter q1 min prn Zan Price MD   3 mL at 08/19/24 1720    Vitamin D3 (CHOLECALCIFEROL)  "tablet 25 mcg  25 mcg Oral Daily Jett Price MD   25 mcg at 24 0802             Review of Systems:     The 10 point Review of Systems is negative other than noted in the HPI            Data:   All laboratory data reviewed  Results for orders placed or performed during the hospital encounter of 24 (from the past 24 hour(s))   Echocardiogram Complete   Result Value Ref Range    LVEF  60-65%     Narrative    [id number]  [id number]  [id number]  [id number]^JETT^R     [facility]  Echocardiography Laboratory  [address] [place] [id number]     Name: PADMAJA POST  MRN: [id number]  : 1929  Study Date: 2024 02:49 PM  Age: 89 yrs  Gender: Female  Patient Location: [facility]  Reason For Study: Abnormal Troponin  Ordering Physician: JETT PRICE  Referring Physician: Khadra Hernandez  Performed By: Avni Mclean RDCS     BSA: 1.7 m2  Height: 63 in  Weight: 139 lb  HR: 93  BP: 128/65 mmHg  ______________________________________________________________________________  Procedure  Complete Portable Echo Adult. Optison (NDC #1836-8146) given intravenously.  ______________________________________________________________________________  Interpretation Summary     Moderate to severe valvular aortic stenosis.(GERONIMO 0.95 MSG 25mmHg/ DI 0.28/SVI  31.6 ml/M2 ( see below)  Left ventricular systolic function is normal.  The visual ejection fraction is 60-65%.  The left ventricle is normal in size.  The left atrium is moderately dilated.     The LVOT measurement is technically very difficult and appears larger than  reported ( LVOT diameter more in range of 2.0cm) which would result in GERONIMO  0.95cm2 , in moderate to severe range. There is also reduced systolic  excursion of the aortic valve leafelts. The SVI is less than 35ml/M2, implying  a degree of \"low flow low gradient\" AS. No old studies for " compariosn  ______________________________________________________________________________  Left Ventricle  The left ventricle is normal in size. There is normal left ventricular wall  thickness. Left ventricular systolic function is normal. The visual ejection  fraction is 60-65%. Grade I or early diastolic dysfunction. No regional wall  motion abnormalities noted. There is no thrombus seen in the left ventricle.     Right Ventricle  The right ventricle is normal in structure, function and size. There is no  mass or thrombus in the right ventricle.     Atria  The left atrium is moderately dilated. Right atrial size is normal. There is  no atrial shunt seen. The left atrial appendage is not well visualized.     Mitral Valve  The mitral valve leaflets appear normal. There is no evidence of stenosis,  fluttering, or prolapse. There is trace to mild mitral regurgitation. There is  no mitral valve stenosis.     Tricuspid Valve  Normal tricuspid valve. No tricuspid regurgitation. There is no tricuspid  stenosis.     Aortic Valve  There is severe trileaflet aortic sclerosis. No aortic regurgitation is  present. Moderate to severe valvular aortic stenosis.     Pulmonic Valve  Normal pulmonic valve. There is no pulmonic valvular regurgitation. There is  no pulmonic valvular stenosis.     Vessels  The aortic root is normal size. Normal size ascending aorta. The inferior vena  cava is normal. The pulmonary artery is normal size.     Pericardium  The pericardium appears normal. There is no pleural effusion.     Rhythm  Sinus rhythm was noted.  ______________________________________________________________________________  MMode/2D Measurements & Calculations  IVSd: 0.80 cm     LVIDd: 3.9 cm  LVIDs: 2.5 cm  LVPWd: 1.1 cm  FS: 34.9 %  LV mass(C)d: 113.6 grams  LV mass(C)dI: 68.6 grams/m2  Ao root diam: 3.1 cm  asc Aorta Diam: 3.7 cm  LVOT diam: 2.1 cm  LVOT area: 3.4 cm2  Ao root diam index Ht(cm/m): 1.9  Ao root diam index BSA  (cm/m2): 1.9  Asc Ao diam index BSA (cm/m2): 2.2  Asc Ao diam index Ht(cm/m): 2.3  RWT: 0.56  TAPSE: 1.8 cm     Doppler Measurements & Calculations  MV E max zach: 114.0 cm/sec  MV A max zach: 100.0 cm/sec  MV E/A: 1.1  MV dec time: 0.15 sec     Ao V2 max: 321.2 cm/sec  Ao max P.3 mmHg  Ao V2 mean: 235.6 cm/sec  Ao mean P.2 mmHg  Ao V2 VTI: 68.0 cm  GERONIMO(I,D): 0.77 cm2  GERONIMO(V,D): 0.95 cm2  LV V1 max PG: 3.2 mmHg  LV V1 max: 90.1 cm/sec  LV V1 VTI: 15.5 cm  SV(LVOT): 52.4 ml  SI(LVOT): 31.6 ml/m2  AV Zach Ratio (DI): 0.28  GERONIMO Index (cm2/m2): 0.47  E/E' av.9  Lateral E/e': 12.5  Medial E/e': 21.4  RV S Zach: 11.7 cm/sec     ______________________________________________________________________________  Report approved by: Dr. Himanshu Way 2024 03:59 PM         CBC with Platelets & Differential    Narrative    The following orders were created for panel order CBC with Platelets & Differential.  Procedure                               Abnormality         Status                     ---------                               -----------         ------                     CBC with platelets and d...[[id number]]  Abnormal            Final result               Manual Differential[[id number]]          Abnormal            Final result                 Please view results for these tests on the individual orders.   Potassium   Result Value Ref Range    Potassium 4.6 3.4 - 5.3 mmol/L   Magnesium   Result Value Ref Range    Magnesium 1.8 1.7 - 2.3 mg/dL   CBC with platelets and differential   Result Value Ref Range    WBC Count 14.9 (H) 4.0 - 11.0 10e3/uL    RBC Count 3.64 (L) 3.80 - 5.20 10e6/uL    Hemoglobin 11.5 (L) 11.7 - 15.7 g/dL    Hematocrit 34.5 (L) 35.0 - 47.0 %    MCV 95 78 - 100 fL    MCH 31.6 26.5 - 33.0 pg    MCHC 33.3 31.5 - 36.5 g/dL    RDW 13.4 10.0 - 15.0 %    Platelet Count 175 150 - 450 10e3/uL    NRBCs per 100 WBC 0 <1 /100    Absolute NRBCs 0.0 10e3/uL   Manual Differential   Result Value Ref  "Range    % Neutrophils 88 %    % Lymphocytes 11 %    % Monocytes 1 %    % Eosinophils 0 %    % Basophils 0 %    Absolute Neutrophils 13.1 (H) 1.6 - 8.3 10e3/uL    Absolute Lymphocytes 1.6 0.8 - 5.3 10e3/uL    Absolute Monocytes 0.1 0.0 - 1.3 10e3/uL    Absolute Eosinophils 0.0 0.0 - 0.7 10e3/uL    Absolute Basophils 0.0 0.0 - 0.2 10e3/uL    RBC Morphology Confirmed RBC Indices     Platelet Assessment  Automated Count Confirmed. Platelet morphology is normal.     Automated Count Confirmed. Platelet morphology is normal.     EKG results:   I have reviewed this patient's EKG with the following interpretation:        Normal sinus rhythm, normal axis, no acute ischemic ST segment or T wave changes     Echocardiology:   reviewed        Results:       Clinically Significant Risk Factors            # Hypomagnesemia: Lowest Mg = 1.6 mg/dL in last 2 days, will replace as needed                 # Overweight: Estimated body mass index is 25.74 kg/m  as calculated from the following:    Height as of this encounter: 1.6 m (5' 3\").    Weight as of this encounter: 65.9 kg (145 lb 4.5 oz)., PRESENT ON ADMISSION     # Financial/Environmental Concerns: none                                  CKD POA List: Stage 3a (GFR 45-59)    Encephalopathy: Metabolic encephalopathy            Chronic Fatigue and Other Debilities: Age-related physical debility  Bed confinement status  Limitation of activities due to disability       "

## 2024-08-21 NOTE — PLAN OF CARE
Goal Outcome Evaluation:    DATE & TIME:8/20/24, [id number]  Cognitive Concerns/ Orientation:A/O x4  BEHAVIOR & AGGRESSION TOOL COLOR:Green, calm and cooperative  ABNL VS/O2:VSS on RA  MOBILITY:Ax1 with gb/w  PAIN MANAGMENT:denies  DIET:Regular  BOWEL/BLADDER:Incontinent of B/B  DRAIN/DEVICES:L PIV SL  TESTS/PROCEDURES:none  D/C DATE:  pending

## 2024-08-21 NOTE — PROGRESS NOTES
Orientation: A&O x4, confused at times  Aggression Stop Light: Green  Activity: Ax1 GB/W  Diet/BS Checks: Regular  IV Access/Drains: PIV SL  Pain Management: Denies  Abnormal VS/Results: VSS on RA  Bowel/Bladder: Incontinent B/B  Skin/Wounds: Redness to coccyx area, mepilex applied  Consults: PT/OT, SW  D/C Disposition: Pending  Other Info:

## 2024-08-21 NOTE — PROGRESS NOTES
Therapy: IV abx  Insurance: [employer]     Pt does not have coverage for IV abx with their [employer] plan. Drug would be billed to the part D and supplies would be self-pay. Based on Ceftriaxone 2gm q24h, total cost is $34.05/day for drug and supplies.     For nursing, patient should have coverage if homebound, however [facility] is not contracted with Medicare and an outside nursing agency would be utilized instead. If patient is not homebound, there is no coverage and [facility] can see patient if patient agrees to self-pay for $90 per visit.    In reference to admission on 8/19/24 to check IV abx coverage    Please contact Intake with any questions, [phone] or In Basket pool,  Home Infusion ([phone]).

## 2024-08-21 NOTE — PROGRESS NOTES
[facility]    Medicine Progress Note - Hospitalist Service    Date of Admission:  8/19/2024    Assessment & Plan     Katrina Portillo is a 89 year old female with medical history significant for hypertension, hypothyroidism, depression, CKD stage III, MONCHO who resides at assisted living facility was brought to the ER by EMS for evaluation of generalized weakness, confusion and fall, found to have a possible UTI with sepsis and admitted on 8/19/2024.       E. coli bacteremia and Sepsis  UTI, due to E. coli and Klebsiella  Denies urinary symptoms.  No respiratory symptoms.  Noted marked leukocytosis.  Lactic acid not elevated.  No nausea vomiting, abdominal pain or diarrhea.  Noted UA with 3 pus cells and positive for nitrite.  COVID-19, influenza A and B and RSV pending.  Chest x-ray without pneumonia.    -IV Zosyn in ER, continued on IV Rocephin 2 g IV daily on admission  -Urine culture showed E. coli and Klebsiella.  Blood culture grew E. coli, sensitivity noted, no p.o. option at this time.  Likely needs PICC line and complete IV antibiotic therapy, ID reviewing.  Discussed with  as well for dispo planning given this.  -ID consulted, appreciate assistance.       Generalized weakness and fall  Possible mild acute encephalopathy due to infection  Likely related to above.  Patient also appears somewhat dehydrated.  Reported being dizzy on admission.  CT head negative for acute intracranial process.  Patient denies any bone or joint pain.  -IV hydration, treat infection as above.  -Patient is well oriented   - PT, OT eval.  Social work consult for discharge planning.  Patient lives at assisted living facility.  -See TTE below     Hypertension.   Sinus tachycardia  Elevated troponin, likely type II MI due to sepsis  Moderate to severe aortic stenosis  Heart rate in 110-120s, could be due to sepsis and or dehydration.  Reports being dizzy.  No chest pain.  On beta-blocker.  -  "Continue PTA medications including losartan and metoprolol  -Normal TSH  -IV hydration as above.  Tachycardia has resolved.  Off IV fluid now  -Noted troponin 46-59,   Patient denies chest pain.  TTE showed moderate to severe aortic stenosis.  Cardiology consult quested     Hypothyroidism  -Resumed PTA Synthroid.  TSH normal     Depression  -Resumed PTA sertraline     CKD stage III  -Creatinine at presentation 1.12 which is her baseline      MONCHO     Heart murmur   -noted, TTE and plan as above          Diet: Combination Diet Regular Diet Adult  Snacks/Supplements Adult: Ensure Enlive; With Meals  Room Service  Diet    DVT Prophylaxis: Pneumatic Compression Devices  Mendoza Catheter: Not present  Lines: None     Cardiac Monitoring: None  Code Status: No CPR- Do NOT Intubate      Clinically Significant Risk Factors            # Hypomagnesemia: Lowest Mg = 1.6 mg/dL in last 2 days, will replace as needed                   # Overweight: Estimated body mass index is 25.74 kg/m  as calculated from the following:    Height as of this encounter: 1.6 m (5' 3\").    Weight as of this encounter: 65.9 kg (145 lb 4.5 oz)., PRESENT ON ADMISSION     # Financial/Environmental Concerns: none           Disposition Plan     Medically Ready for Discharge: Anticipated Tomorrow likely tomorrow pending PICC line, plan for IV antibiotic-Home versus outpatient versus TCU, also pending cardiology eval.  Discussed with patient's daughter Danika over the phone.       Zan Price MD  Hospitalist Service  [facility]  Securely message with [email or web address] (more info)  Text page via [facility] Paging/Directory   ______________________________________________________________________    Interval History     Chart reviewed, discussed with nursing staff and patient was seen this morning.  Patient continues to feel better.  Remains afebrile.  Remains well oriented and without agitation or confusion.      Physical Exam   Vital Signs: Temp: " 98.6  F (37  C) Temp src: Oral BP: (!) 132/90 Pulse: 92   Resp: 16 SpO2: 98 % O2 Device: None (Room air)    Weight: 145 lbs 4.53 oz    General: AAOx3, appears comfortable.  HEENT: PERRLA EOMI. Mucosa moist  Lungs: Bilateral equal air entry. Clear to auscultation, normal work of breathing.   CVS: S1S2 regular, tachycardic, systolic murmur present.   Abdomen: Soft, NT, ND. BS heard.  MSK: No edema or deformities.  Neuro: Alert awake, oriented, face symmetrical. Strength symmetrical.  Skin: No rash    Medical Decision Making       45 MINUTES SPENT BY ME on the date of service doing chart review, history, exam, documentation & further activities per the note.      Data     I have personally reviewed the following data over the past 24 hrs:    14.9 (H)  \   11.5 (L)   / 175     N/A N/A N/A /  N/A   4.6 N/A N/A \       Imaging results reviewed over the past 24 hrs:   Recent Results (from the past 24 hour(s))   Echocardiogram Complete   Result Value    LVEF  60-65%    Narrative    [id number]  [id number]  [id number]  [id number]^ARON^JETT^BEHZAD     [facility]  Echocardiography Laboratory  [address] [place] [address]     Name: PADMAJA POST  MRN: [id number]  : 1929  Study Date: 2024 02:49 PM  Age: 89 yrs  Gender: Female  Patient Location: [facility]  Reason For Study: Abnormal Troponin  Ordering Physician: JETT SHARP  Referring Physician: Khadra Hernandez  Performed By: Avni Mclean RDCS     BSA: 1.7 m2  Height: 63 in  Weight: 139 lb  HR: 93  BP: 128/65 mmHg  ______________________________________________________________________________  Procedure  Complete Portable Echo Adult. Optison (NDC #2959-1107) given intravenously.  ______________________________________________________________________________  Interpretation Summary     Moderate to severe valvular aortic stenosis.(GERONIMO 0.95 MSG 25mmHg/ DI 0.28/SVI  31.6 ml/M2 ( see below)  Left ventricular systolic function is normal.  The  "visual ejection fraction is 60-65%.  The left ventricle is normal in size.  The left atrium is moderately dilated.     The LVOT measurement is technically very difficult and appears larger than  reported ( LVOT diameter more in range of 2.0cm) which would result in GERONIMO  0.95cm2 , in moderate to severe range. There is also reduced systolic  excursion of the aortic valve leafelts. The SVI is less than 35ml/M2, implying  a degree of \"low flow low gradient\" AS. No old studies for compariosn  ______________________________________________________________________________  Left Ventricle  The left ventricle is normal in size. There is normal left ventricular wall  thickness. Left ventricular systolic function is normal. The visual ejection  fraction is 60-65%. Grade I or early diastolic dysfunction. No regional wall  motion abnormalities noted. There is no thrombus seen in the left ventricle.     Right Ventricle  The right ventricle is normal in structure, function and size. There is no  mass or thrombus in the right ventricle.     Atria  The left atrium is moderately dilated. Right atrial size is normal. There is  no atrial shunt seen. The left atrial appendage is not well visualized.     Mitral Valve  The mitral valve leaflets appear normal. There is no evidence of stenosis,  fluttering, or prolapse. There is trace to mild mitral regurgitation. There is  no mitral valve stenosis.     Tricuspid Valve  Normal tricuspid valve. No tricuspid regurgitation. There is no tricuspid  stenosis.     Aortic Valve  There is severe trileaflet aortic sclerosis. No aortic regurgitation is  present. Moderate to severe valvular aortic stenosis.     Pulmonic Valve  Normal pulmonic valve. There is no pulmonic valvular regurgitation. There is  no pulmonic valvular stenosis.     Vessels  The aortic root is normal size. Normal size ascending aorta. The inferior vena  cava is normal. The pulmonary artery is normal size.     Pericardium  The " pericardium appears normal. There is no pleural effusion.     Rhythm  Sinus rhythm was noted.  ______________________________________________________________________________  MMode/2D Measurements & Calculations  IVSd: 0.80 cm     LVIDd: 3.9 cm  LVIDs: 2.5 cm  LVPWd: 1.1 cm  FS: 34.9 %  LV mass(C)d: 113.6 grams  LV mass(C)dI: 68.6 grams/m2  Ao root diam: 3.1 cm  asc Aorta Diam: 3.7 cm  LVOT diam: 2.1 cm  LVOT area: 3.4 cm2  Ao root diam index Ht(cm/m): 1.9  Ao root diam index BSA (cm/m2): 1.9  Asc Ao diam index BSA (cm/m2): 2.2  Asc Ao diam index Ht(cm/m): 2.3  RWT: 0.56  TAPSE: 1.8 cm     Doppler Measurements & Calculations  MV E max zach: 114.0 cm/sec  MV A max zach: 100.0 cm/sec  MV E/A: 1.1  MV dec time: 0.15 sec     Ao V2 max: 321.2 cm/sec  Ao max P.3 mmHg  Ao V2 mean: 235.6 cm/sec  Ao mean P.2 mmHg  Ao V2 VTI: 68.0 cm  GERONIMO(I,D): 0.77 cm2  GERONIMO(V,D): 0.95 cm2  LV V1 max PG: 3.2 mmHg  LV V1 max: 90.1 cm/sec  LV V1 VTI: 15.5 cm  SV(LVOT): 52.4 ml  SI(LVOT): 31.6 ml/m2  AV Zach Ratio (DI): 0.28  GERONIMO Index (cm2/m2): 0.47  E/E' av.9  Lateral E/e': 12.5  Medial E/e': 21.4  RV S Zach: 11.7 cm/sec     ______________________________________________________________________________  Report approved by: Dr. Himanshu Way 2024 03:59 PM

## 2024-08-21 NOTE — PLAN OF CARE
Goal Outcome Evaluation:    Orientation: AO x3, d/o to time and forgetful/int confusion  Aggression Stop Light: Green  Activity: A1 GB+W, up to W/C during the day  Diet/BS Checks: Reg w/ RS  Tele:  d/c'd  IV Access/Drains: L PIV SL  Pain Management: Denies  Abnormal VS/Results: VSS on RA except HTN, resolved w/ AM meds  Bowel/Bladder: Incont b/b, purewick in place while in bed  Skin/Wounds: Intact, some redness to sacrum and baltazar  Consults: SW/CC, OT, PT, ID, Card  D/C Disposition: Back to [facility] tomorrow pending PO vs IV abx plan  Other Info: Neuros intact. Enjoyed several W/C trips around unit and interacting w/ staff.

## 2024-08-21 NOTE — PROGRESS NOTES
[facility]    Infectious Disease Progress Note    Date of Service : 08/21/2024    Assessment:  89 YF with hypertension, hypothyroidism, depression, CKD stage III, MONCHO who has been admitted from the assisted care facility with weakness, confusion and fall, found to have E.coli bacteremia . Ua with only 3 WBC not suggestive of infection and cx grew polymicrobial martha suggestive of colonization     -E.coli bacteremia  of unclear source -? GI translocation due to diarrheal episode vs urinary source. Unlikely urinary source since UA only shows 3WBCs not suggestive of infection  -Fever and leukocytosis related to blood stream infection  -Encephalopathy  -BENJAMIN superimposed on CKD, creatinine is better today  -Hypomagnesemia  -Chronic medical conditions -hypertension, hypothyroidism, depression, CKD stage III, MONCHO         Recommendations:  Ceftriaxone while in hospital  Transition to oral antibiotics at discharge. Susceptibility data not available yet. Transition to Bactrim if isolate is sensitive  Could likely discharge after dose of Ceftriaxone today and start oral antibiotics from tomorrow for another week. Susceptibilities will need to be followed however.  Recommendations were discussed with the hospitalist service  ID will sign off, patient would like to discharge today.      ADDENDUM  Susceptibility data reviewed. Isolate is resistant to Bactrim, Amoxicillin and Quinolones and intermediate to Augmentin. Yordan to micro lab to set up susceptibility to Cefazolin as Cephalexin has good bioavailability, and  can be an oral option. Id susceptible, can discharge on keflex 1000 mg TID tomorrow to complete out treatment course, else will need t complete treatment with IV Ceftriaxone    Hospitalist team was updated on plan  Tori Julien MD    Interval History   Feels much better, feels at baseline, afebrile, leukocytosis is improving, tolerating antibiotics without side effects    Physical Exam   Temp:  98.6  F (37  C) Temp src: Oral BP: (!) 185/121 Pulse: 80   Resp: 16 SpO2: 98 % O2 Device: None (Room air)    Vitals:    08/19/24 1234 08/19/24 1720 08/21/24 0622   Weight: 63.5 kg (140 lb) 63.1 kg (139 lb 1.8 oz) 65.9 kg (145 lb 4.5 oz)     Vital Signs with Ranges  Temp:  [98  F (36.7  C)-99  F (37.2  C)] 98.6  F (37  C)  Pulse:  [79-95] 80  Resp:  [16] 16  BP: (158-185)/() 185/121  SpO2:  [97 %-98 %] 98 %    Constitutional: Awake, alert, cooperative, no apparent distress  Lungs: Clear to auscultation bilaterally, no crackles or wheezing  Cardiovascular: Regular rate and rhythm, normal S1 and S2, and no murmur noted  Abdomen: Normal bowel sounds, soft, non-distended, non-tender  Skin: No rashes, no cyanosis, no edema  Other:    Medications   Current Facility-Administered Medications   Medication Dose Route Frequency Provider Last Rate Last Admin     Current Facility-Administered Medications   Medication Dose Route Frequency Provider Last Rate Last Admin    cefTRIAXone (ROCEPHIN) 2 g vial to attach to  ml bag for ADULTS or NS 50 ml bag for PEDS  2 g Intravenous Q24H Zan Price MD   2 g at 08/20/24 1802    famotidine (PEPCID) tablet 20 mg  20 mg Oral Daily Zan Price MD   20 mg at 08/21/24 0802    levothyroxine (SYNTHROID/LEVOTHROID) tablet 88 mcg  88 mcg Oral Daily Zan Price MD   88 mcg at 08/21/24 0655    losartan (COZAAR) half-tab 12.5 mg  12.5 mg Oral Daily Zan Price MD   12.5 mg at 08/21/24 0800    metoprolol succinate ER (TOPROL-XL) 24 hr half-tab 12.5 mg  12.5 mg Oral Daily Zan Price MD   12.5 mg at 08/21/24 0802    sertraline (ZOLOFT) tablet 50 mg  50 mg Oral Daily Zan Price MD   50 mg at 08/21/24 0802    sodium chloride (PF) 0.9% PF flush 3 mL  3 mL Intracatheter Q8H Zan Price MD   3 mL at 08/19/24 1721    Vitamin D3 (CHOLECALCIFEROL) tablet 25 mcg  25 mcg Oral Daily Zan Price MD   25 mcg at 08/21/24 0802       Data   All microbiology  laboratory data reviewed.  Recent Labs   Lab Test 08/21/24  0702 08/20/24  0519 08/19/24  1241   WBC 14.9* 16.3* 24.1*   HGB 11.5* 10.6* 12.0   HCT 34.5* 32.6* 37.2   MCV 95 96 96    160 193     Recent Labs   Lab Test 08/20/24  0519 08/19/24  1241 03/25/24  1440   CR 1.01* 1.12* 1.28*     08/19/2024 1405 08/21/2024 0815 Blood Culture Peripheral Blood [[id number]]   (Abnormal)   Peripheral Blood    Preliminary result Component Value   Culture Positive on the 1st day of incubation Abnormal  P    Escherichia coli Panic  P    2 of 2 bottles             08/19/2024 1405 08/20/2024 0454 Verigene GN Panel [[id number]]    (Abnormal)   Peripheral Blood    Final result Component Value   Acinetobacter species Not Detected   Citrobacter species Not Detected   Enterobacter species Not Detected   Proteus species Not Detected   Escherichia coli Detected Abnormal    Positive for Escherichia coli by Verigene multiplex nucleic acid test. Final identification and antimicrobial susceptibility testing will be verified by standard methods. Verigene test will not distinguish E. coli from Shigella species including Shigella dysenteriae, Shigella flexneri, Shigella boydii, and Shigella sonnei. Specimens containing Shigella species or E. coli will be reported as positive for E. coli.   Klebsiella pneumoniae Not Detected   Klebsiella oxytoca Not Detected   Pseudomonas aeruginosa Not Detected   CTX-M Not Detected   KPC Not Detected   NDM Not Detected   VIM Not Detected   IMP Not Detected   OXA Not Detected          08/19/2024 1308 08/21/2024 0757 Urine Culture [[id number]]   (Abnormal)   Urine, Catheter    Preliminary result Component Value   Culture Culture in progress P    50,000-100,000 CFU/mL Escherichia coli Abnormal  P    50,000-100,000 CFU/mL Klebsiella pneumoniae Abnormal  P    Identification is preliminary, confirmation in progress    50,000-100,000 CFU/mL Gram negative bacilli Abnormal  P

## 2024-08-22 ENCOUNTER — TELEPHONE (OUTPATIENT)
Dept: CARDIOLOGY | Facility: CLINIC | Age: 89
End: 2024-08-22
Payer: COMMERCIAL

## 2024-08-22 VITALS
DIASTOLIC BLOOD PRESSURE: 88 MMHG | HEART RATE: 95 BPM | RESPIRATION RATE: 17 BRPM | HEIGHT: 63 IN | OXYGEN SATURATION: 98 % | WEIGHT: 145.28 LBS | BODY MASS INDEX: 25.74 KG/M2 | TEMPERATURE: 97.3 F | SYSTOLIC BLOOD PRESSURE: 127 MMHG

## 2024-08-22 LAB
BACTERIA BLD CULT: ABNORMAL
BACTERIA BLD CULT: ABNORMAL
MAGNESIUM SERPL-MCNC: 1.8 MG/DL (ref 1.7–2.3)
POTASSIUM SERPL-SCNC: 4.4 MMOL/L (ref 3.4–5.3)

## 2024-08-22 PROCEDURE — 250N000013 HC RX MED GY IP 250 OP 250 PS 637: Performed by: HOSPITALIST

## 2024-08-22 PROCEDURE — 84132 ASSAY OF SERUM POTASSIUM: CPT | Performed by: HOSPITALIST

## 2024-08-22 PROCEDURE — 99232 SBSQ HOSP IP/OBS MODERATE 35: CPT | Performed by: SPECIALIST

## 2024-08-22 PROCEDURE — 250N000011 HC RX IP 250 OP 636: Performed by: HOSPITALIST

## 2024-08-22 PROCEDURE — 99239 HOSP IP/OBS DSCHRG MGMT >30: CPT | Performed by: HOSPITALIST

## 2024-08-22 PROCEDURE — 83735 ASSAY OF MAGNESIUM: CPT | Performed by: HOSPITALIST

## 2024-08-22 PROCEDURE — 36415 COLL VENOUS BLD VENIPUNCTURE: CPT | Performed by: HOSPITALIST

## 2024-08-22 RX ORDER — CEFTRIAXONE 2 G/1
2 INJECTION, POWDER, FOR SOLUTION INTRAMUSCULAR; INTRAVENOUS EVERY 24 HOURS
Status: DISCONTINUED | OUTPATIENT
Start: 2024-08-22 | End: 2024-08-22 | Stop reason: HOSPADM

## 2024-08-22 RX ORDER — CEPHALEXIN 500 MG/1
1000 CAPSULE ORAL 3 TIMES DAILY
Qty: 42 CAPSULE | Refills: 0 | Status: SHIPPED | OUTPATIENT
Start: 2024-08-23 | End: 2024-08-30

## 2024-08-22 RX ADMIN — LEVOTHYROXINE SODIUM 88 MCG: 88 TABLET ORAL at 06:43

## 2024-08-22 RX ADMIN — FAMOTIDINE 20 MG: 20 TABLET, FILM COATED ORAL at 08:04

## 2024-08-22 RX ADMIN — SERTRALINE HYDROCHLORIDE 50 MG: 50 TABLET ORAL at 08:04

## 2024-08-22 RX ADMIN — CEFTRIAXONE SODIUM 2 G: 2 INJECTION, POWDER, FOR SOLUTION INTRAMUSCULAR; INTRAVENOUS at 09:09

## 2024-08-22 RX ADMIN — LOSARTAN POTASSIUM 12.5 MG: 25 TABLET, FILM COATED ORAL at 08:04

## 2024-08-22 RX ADMIN — Medication 25 MCG: at 08:04

## 2024-08-22 RX ADMIN — METOPROLOL SUCCINATE 12.5 MG: 25 TABLET, EXTENDED RELEASE ORAL at 08:03

## 2024-08-22 RX ADMIN — HYDRALAZINE HYDROCHLORIDE 5 MG: 20 INJECTION INTRAMUSCULAR; INTRAVENOUS at 08:11

## 2024-08-22 ASSESSMENT — ACTIVITIES OF DAILY LIVING (ADL)
ADLS_ACUITY_SCORE: 40

## 2024-08-22 NOTE — PLAN OF CARE
[id number]    Orientation: AOx2, somewhat confused overnight, but redirectable  Aggression Stop Light: Green  Activity: A1/GB/W, up to W/C during the day  Diet/BS Checks: Reg  IV Access/Drains: L-PIV-SL  Pain Management: Denies  Abnormal VS/Results: /99,  not high enough for PRN meds  Bowel/Bladder: Incont b/b, purewick in place while in bed  Skin/Wounds: Intact, some redness to sacrum and baltazar  Consults: SW/CC, OT, PT, ID  D/C Disposition: Back to [facility] tomorrow pending PO vs IV abx plan

## 2024-08-22 NOTE — PLAN OF CARE
Patient discharged at 2:05 PM to Discharged to assisted living  IV was discontinued. Pain at time of discharge was 0/10. Belongings returned to patient.  Discharge instructions and medications reviewed with patient.  Patient and daughter verbalized understanding and all questions were answered.  Prescriptions filled given to patient.  At time of discharge, patient condition was stable and left the unit 1345 escorted by daughter.  Wheelchair ride given to door 6. Paperwork and antibiotic handed over to daughter.

## 2024-08-22 NOTE — CONSULTS
Care Management Discharge Note    Discharge Date: 08/22/2024       Discharge Disposition: Assisted Living, Home Care (orders for Rn/PT/OT [facility] to set up they required orders and face to face)    Discharge Services: None    Discharge DME: None    Discharge Transportation: family or friend will provide Daughter Danika coming at ~13:00 to provide transport     Private pay costs discussed: Not applicable    Does the patient's insurance plan have a 3 day qualifying hospital stay waiver?  No    PAS Confirmation Code: n/s   Patient/family educated on Medicare website which has current facility and service quality ratings: no    Education Provided on the Discharge Plan:  yes  Persons Notified of Discharge Plans: Message to [facility] phone [phone], Daughter Danika by phone, Bedside RN  Patient/Family in Agreement with the Plan: yes    Handoff Referral Completed: No    Additional Information:  Writer notified by rounding provider that the patient does NOT require IV antx at discharge and PO is ordered through our discharge RX.  Writer called [facility] and left a message for Trini orders faxed.  Daughter plans on coming in ~13:00 to review discharge orders with bedside and provide transportation back to [facility].  Daughter is aware that the [facility] will be coordinating the homecare RN and PT/OT per Daughters preference. Writer notified [facility] (as they secured during this stay to disregard).  No further needs identified.      Norma Corey RN, BSN, ACM   Care Transitions Specialist  [facility]  Care Transitions Specialist  [id number] [id number]  [email or web address]  Office: [phone] Fax: [phone]  [facility]

## 2024-08-22 NOTE — PROGRESS NOTES
[facility]    Infectious Disease Progress Note    Date of Service : 08/22/2024     Assessment:  89 YF with hypertension, hypothyroidism, depression, CKD stage III, MONCHO who has been admitted from the assisted care facility with weakness, confusion and fall, found to have E.coli bacteremia . Ua with only 3 WBC not suggestive of infection and cx grew polymicrobial martha suggestive of colonization     -E.coli bacteremia  of urinary source.  UA only shows 3WBCs thouhg cx grew E.coli and klebsiella pneumoniae  -Fever and leukocytosis related to blood stream infection  -Encephalopathy  -BENJAMIN superimposed on CKD, creatinine is better today  -Hypomagnesemia  -Chronic medical conditions -hypertension, hypothyroidism, depression, CKD stage III, MONCHO         Recommendations:  Await susceptibility to Cefazolin of blood cx isolate of E.coli. Urinary isolate is sensitive to Cefazolin and feel bacteremia is related to the same strain based on susceptibility pattern  Discharge on Cephalexin 1000 mg PO TID, treat for another week to complete 10 day treatment course    ID will sign off, please call us back as needed    Tori Julien MD    Interval History   Remains stable, doing well, no new complaints, tolerating antibiotics without side effects      Physical Exam   Temp: 97.3  F (36.3  C) Temp src: Axillary BP: (!) 171/109 Pulse: 89   Resp: 17 SpO2: 98 % O2 Device: None (Room air)    Vitals:    08/19/24 1234 08/19/24 1720 08/21/24 0622   Weight: 63.5 kg (140 lb) 63.1 kg (139 lb 1.8 oz) 65.9 kg (145 lb 4.5 oz)     Vital Signs with Ranges  Temp:  [97.3  F (36.3  C)-98.4  F (36.9  C)] 97.3  F (36.3  C)  Pulse:  [87-93] 89  Resp:  [16-17] 17  BP: (132-171)/() 171/109  SpO2:  [97 %-98 %] 98 %    Constitutional: Awake, alert, cooperative, no apparent distress  Lungs: Clear to auscultation bilaterally, no crackles or wheezing  Cardiovascular: Regular rate and rhythm, normal S1 and S2, and no murmur  noted  Abdomen: Normal bowel sounds, soft, non-distended, non-tender  Skin: No rashes, no cyanosis, no edema  Other:    Medications   Current Facility-Administered Medications   Medication Dose Route Frequency Provider Last Rate Last Admin     Current Facility-Administered Medications   Medication Dose Route Frequency Provider Last Rate Last Admin    cefTRIAXone (ROCEPHIN) 2 g vial to attach to  ml bag for ADULTS or NS 50 ml bag for PEDS  2 g Intravenous Q24H Zan Price MD   2 g at 08/21/24 1722    famotidine (PEPCID) tablet 20 mg  20 mg Oral Daily Zan Price MD   20 mg at 08/21/24 0802    levothyroxine (SYNTHROID/LEVOTHROID) tablet 88 mcg  88 mcg Oral Daily Zan Price MD   88 mcg at 08/22/24 0643    losartan (COZAAR) half-tab 12.5 mg  12.5 mg Oral Daily Zan Price MD   12.5 mg at 08/21/24 0800    metoprolol succinate ER (TOPROL-XL) 24 hr half-tab 12.5 mg  12.5 mg Oral Daily Zan Price MD   12.5 mg at 08/21/24 0802    sertraline (ZOLOFT) tablet 50 mg  50 mg Oral Daily Zan Price MD   50 mg at 08/21/24 0802    sodium chloride (PF) 0.9% PF flush 3 mL  3 mL Intracatheter Q8H Zan Price MD   3 mL at 08/21/24 2340    Vitamin D3 (CHOLECALCIFEROL) tablet 25 mcg  25 mcg Oral Daily Zan Price MD   25 mcg at 08/21/24 0802       Data   All microbiology laboratory data reviewed.  Recent Labs   Lab Test 08/21/24  0702 08/20/24  0519 08/19/24  1241   WBC 14.9* 16.3* 24.1*   HGB 11.5* 10.6* 12.0   HCT 34.5* 32.6* 37.2   MCV 95 96 96    160 193     Recent Labs   Lab Test 08/20/24  0519 08/19/24  1241 03/25/24  1440   CR 1.01* 1.12* 1.28*     Microbiology  08/19/2024 1405 08/22/2024 0721 Blood Culture Peripheral Blood [[id number]]     (Abnormal)   Peripheral Blood    Preliminary result Component Value   Culture Positive on the 1st day of incubation Abnormal  P    Escherichia coli Panic  P    2 of 2 bottles       Susceptibility     Escherichia coli     SUNITHA      Amikacin <=2 ug/mL Susceptible *     Ampicillin >=32 ug/mL Resistant     Ampicillin/ Sulbactam 16 ug/mL Intermediate     Cefepime <=1 ug/mL Susceptible     Cefoxitin <=4 ug/mL Susceptible *     Ceftazidime <=1 ug/mL Susceptible     Ceftriaxone <=1 ug/mL Susceptible     Ciprofloxacin >=4 ug/mL Resistant     Extended Spectrum Beta-Lactamase Negative ug/mL ESBL Negative *     Gentamicin <=1 ug/mL Susceptible     Levofloxacin 4 ug/mL Resistant     Meropenem <=0.25 ug/mL Susceptible     Nitrofurantoin <=16 ug/mL Susceptible *     Piperacillin/Tazobactam <=4 ug/mL Susceptible     Tobramycin <=1 ug/mL Susceptible     Trimethoprim/Sulfamethoxazole >16/304 ug/mL Resistant              * Suppressed Antibiotic             08/19/2024 1405 08/20/2024 0454 Verigene GN Panel [[id number]]    (Abnormal)   Peripheral Blood    Final result Component Value   Acinetobacter species Not Detected   Citrobacter species Not Detected   Enterobacter species Not Detected   Proteus species Not Detected   Escherichia coli Detected Abnormal    Positive for Escherichia coli by [employer] multiplex nucleic acid test. Final identification and antimicrobial susceptibility testing will be verified by standard methods. Verigene test will not distinguish E. coli from Shigella species including Shigella dysenteriae, Shigella flexneri, Shigella boydii, and Shigella sonnei. Specimens containing Shigella species or E. coli will be reported as positive for E. coli.   Klebsiella pneumoniae Not Detected   Klebsiella oxytoca Not Detected   Pseudomonas aeruginosa Not Detected   CTX-M Not Detected   KPC Not Detected   NDM Not Detected   VIM Not Detected   IMP Not Detected   OXA Not Detected          08/19/2024 1308 08/21/2024 2008 Urine Culture [[id number]]    (Abnormal)   Urine, Catheter    Preliminary result Component Value   Culture Culture in progress P    50,000-100,000 CFU/mL Escherichia coli Abnormal  P    50,000-100,000 CFU/mL Klebsiella pneumoniae  Abnormal  P    Identification is preliminary, confirmation in progress    50,000-100,000 CFU/mL Klebsiella pneumoniae Abnormal  P       Susceptibility     Escherichia coli Klebsiella pneumoniae     SUNITHA SUNITHA     Amikacin <=2 ug/mL Susceptible * <=2 ug/mL Susceptible *     Ampicillin >=32 ug/mL Resistant  Resistant 1     Ampicillin/ Sulbactam 16 ug/mL Intermediate 4 ug/mL Susceptible     Cefazolin <=4 ug/mL Susceptible 2 <=4 ug/mL Susceptible 2     Cefepime <=1 ug/mL Susceptible <=1 ug/mL Susceptible     Cefoxitin <=4 ug/mL Susceptible <=4 ug/mL Susceptible     Ceftazidime <=1 ug/mL Susceptible <=1 ug/mL Susceptible     Ceftriaxone <=1 ug/mL Susceptible <=1 ug/mL Susceptible     Ciprofloxacin >=4 ug/mL Resistant <=0.25 ug/mL Susceptible     Extended Spectrum Beta-Lactamase Negative ug/mL ESBL Negative * Negative ug/mL ESBL Negative *     Gentamicin <=1 ug/mL Susceptible <=1 ug/mL Susceptible     Levofloxacin 4 ug/mL Resistant <=0.12 ug/mL Susceptible     Meropenem <=0.25 ug/mL Susceptible * <=0.25 ug/mL Susceptible *     Nitrofurantoin <=16 ug/mL Susceptible 64 ug/mL Intermediate     Piperacillin/Tazobactam <=4 ug/mL Susceptible <=4 ug/mL Susceptible     Tobramycin <=1 ug/mL Susceptible <=1 ug/mL Susceptible     Trimethoprim/Sulfamethoxazole >16/304 ug/mL Resistant <=1/19 ug/mL Susceptible

## 2024-08-22 NOTE — TELEPHONE ENCOUNTER
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 Per protocol send TE to clinic for review. Please assist patient.     Action Taken: Other: Cardio    Travel Screening: Not Applicable     Date of Service:

## 2024-08-22 NOTE — PLAN OF CARE
Summary: 8/21/24 [id number]  Primary Diagnosis: UTI Sepsis     ONLY POST BELOW FOR END OF SHIFT NOTE     Orientation: AOx2, d/o to situation/time and forgetful/int confusion  Aggression Stop Light: Green  Activity: A1/GB/W, up to W/C during the day  Diet/BS Checks: Reg w/ RS  Tele:  d/c'd  IV Access/Drains: L-PIV-SL  Pain Management: Denies  Abnormal VS/Results: VSS on RA except HTN  Bowel/Bladder: Incont b/b, purewick in place while in bed  Skin/Wounds: Intact, some redness to sacrum and baltazar  Consults: SW/CC, OT, PT, ID, Card  D/C Disposition: Back to [facility] tomorrow pending PO vs IV abx plan  Other Info: Neuros intact. Enjoyed several W/C trips around unit and interacting w/ staff.

## 2024-08-23 ENCOUNTER — TELEPHONE (OUTPATIENT)
Dept: CARDIOLOGY | Facility: CLINIC | Age: 89
End: 2024-08-23
Payer: COMMERCIAL

## 2024-08-23 ENCOUNTER — PATIENT OUTREACH (OUTPATIENT)
Dept: CARE COORDINATION | Facility: CLINIC | Age: 89
End: 2024-08-23
Payer: COMMERCIAL

## 2024-08-23 NOTE — PLAN OF CARE
Physical Therapy Discharge Summary    Reason for therapy discharge:    Discharged to home with home therapy.    Progress towards therapy goal(s). See goals on Care Plan in [facility] electronic health record for goal details.  Goals partially met.  Barriers to achieving goals:   discharge from facility.    Therapy recommendation(s):    Continued therapy is recommended.  Rationale/Recommendations:   . PT Discharge Planning:    PT Plan: transfers, strengthening  PT Discharge Recommendation (DC Rec): home with home care physical therapy, home with assist  PT Rationale for DC Rec: Anticipate pt will be able to return to [facility] at discharge with assist as before (assist 1 for ADLs and transfers), rec course of home PT at [facility]. Pt transferred bed to w/c with min to mod assist 1 today.  PT Brief overview of current status: Ax1 pivot with FWW    Recommendation above provided by last treating therapist.

## 2024-08-23 NOTE — TELEPHONE ENCOUNTER
Sent message to scheduling. Patient needs echo and valve clinic visit in 6 months per MORTEZA Chavez.

## 2024-08-23 NOTE — TELEPHONE ENCOUNTER
Called patient to schedule 6 mon follow up echo around 2/2025. Patient stated that was quite a bit away and requested I call her closer to February to schedule .     Let patient know I can do that.     Richa Balderas  Structural Heart Procedure   [facility]/ [facility]

## 2024-08-23 NOTE — PROGRESS NOTES
[facility] Care Resource [place]    Background: Transitional Care Management program identified per system criteria and reviewed by [facility] team for possible outreach.    Assessment: Upon chart review, [facility] Team member will not proceed with patient outreach related to this episode of Transitional Care Management program due to reason below:    Patient has discharged to a Memory Care, Long-term Care, Assisted Living or Group Home where patient is receiving on-site support with their daily cares, including support with hospital follow up plan.    Plan: Transitional Care Management episode addressed appropriately per reason noted above.      LO Berman  Connected Care Resource [place], [facility]    *Connected Care Resource Team does NOT follow patient ongoing. Referrals are identified based on internal discharge reports and the outreach is to ensure patient has an understanding of their discharge instructions.

## 2024-08-23 NOTE — RESULT ENCOUNTER NOTE
UC w final sensitivities for E. coli and Klebsiella. Blood culture grew E coli.  ID consulted and discharged on 7 more days of Keflex 1 g 3 times daily. FYI to ID

## 2024-08-23 NOTE — PLAN OF CARE
Occupational Therapy Discharge Summary    Reason for therapy discharge:    Discharged to home with home therapy.    Progress towards therapy goal(s). See goals on Care Plan in [facility] electronic health record for goal details.  Goals partially met.  Barriers to achieving goals:   discharge from facility.    Therapy recommendation(s):    Continued therapy is recommended.  Rationale/Recommendations:  Pt near her baseline level of function and with continued medical mgmt and inpatient therapy should be able to return to her [facility] with A of 1 caregiver for transfers and self-cares and  OT to advance strength, endurance and maximal indep with self-cares.

## 2024-08-24 LAB
BACTERIA UR CULT: ABNORMAL

## 2024-08-27 ENCOUNTER — LAB REQUISITION (OUTPATIENT)
Dept: LAB | Facility: CLINIC | Age: 89
End: 2024-08-27
Payer: COMMERCIAL

## 2024-08-27 DIAGNOSIS — I10 ESSENTIAL (PRIMARY) HYPERTENSION: ICD-10-CM

## 2024-09-10 LAB
ALBUMIN SERPL BCG-MCNC: 3.4 G/DL (ref 3.5–5.2)
ALP SERPL-CCNC: 116 U/L (ref 40–150)
ALT SERPL W P-5'-P-CCNC: 13 U/L (ref 0–50)
ANION GAP SERPL CALCULATED.3IONS-SCNC: 11 MMOL/L (ref 7–15)
AST SERPL W P-5'-P-CCNC: 20 U/L (ref 0–45)
BILIRUB SERPL-MCNC: 0.2 MG/DL
BUN SERPL-MCNC: 34.8 MG/DL (ref 8–23)
CALCIUM SERPL-MCNC: 9 MG/DL (ref 8.8–10.4)
CHLORIDE SERPL-SCNC: 107 MMOL/L (ref 98–107)
CREAT SERPL-MCNC: 1.24 MG/DL (ref 0.51–0.95)
EGFRCR SERPLBLD CKD-EPI 2021: 40 ML/MIN/1.73M2
GLUCOSE SERPL-MCNC: 86 MG/DL (ref 70–99)
HCO3 SERPL-SCNC: 21 MMOL/L (ref 22–29)
POTASSIUM SERPL-SCNC: 4.2 MMOL/L (ref 3.4–5.3)
PROT SERPL-MCNC: 6.8 G/DL (ref 6.4–8.3)
SODIUM SERPL-SCNC: 139 MMOL/L (ref 135–145)

## 2024-09-10 PROCEDURE — 80053 COMPREHEN METABOLIC PANEL: CPT | Mod: ORL | Performed by: NURSE PRACTITIONER

## 2024-09-10 PROCEDURE — P9603 ONE-WAY ALLOW PRORATED MILES: HCPCS | Mod: ORL | Performed by: NURSE PRACTITIONER

## 2024-09-10 PROCEDURE — 36415 COLL VENOUS BLD VENIPUNCTURE: CPT | Mod: ORL | Performed by: NURSE PRACTITIONER

## 2024-11-08 ENCOUNTER — APPOINTMENT (OUTPATIENT)
Dept: GENERAL RADIOLOGY | Facility: CLINIC | Age: 89
End: 2024-11-08
Attending: EMERGENCY MEDICINE
Payer: COMMERCIAL

## 2024-11-08 ENCOUNTER — HOSPITAL ENCOUNTER (EMERGENCY)
Facility: CLINIC | Age: 89
Discharge: HOME | End: 2024-11-08
Attending: EMERGENCY MEDICINE | Admitting: EMERGENCY MEDICINE
Payer: COMMERCIAL

## 2024-11-08 VITALS
SYSTOLIC BLOOD PRESSURE: 146 MMHG | HEART RATE: 95 BPM | WEIGHT: 140 LBS | RESPIRATION RATE: 18 BRPM | BODY MASS INDEX: 22.5 KG/M2 | TEMPERATURE: 97.1 F | OXYGEN SATURATION: 100 % | DIASTOLIC BLOOD PRESSURE: 70 MMHG | HEIGHT: 66 IN

## 2024-11-08 DIAGNOSIS — R53.1 GENERALIZED WEAKNESS: ICD-10-CM

## 2024-11-08 LAB
ALBUMIN UR-MCNC: NEGATIVE MG/DL
ANION GAP SERPL CALCULATED.3IONS-SCNC: 12 MMOL/L (ref 7–15)
APPEARANCE UR: CLEAR
ATRIAL RATE - MUSE: 93 BPM
BACTERIA #/AREA URNS HPF: ABNORMAL /HPF
BILIRUB UR QL STRIP: NEGATIVE
BUN SERPL-MCNC: 28.5 MG/DL (ref 8–23)
CALCIUM SERPL-MCNC: 9.2 MG/DL (ref 8.8–10.4)
CHLORIDE SERPL-SCNC: 103 MMOL/L (ref 98–107)
COLOR UR AUTO: ABNORMAL
CREAT SERPL-MCNC: 1.39 MG/DL (ref 0.51–0.95)
DIASTOLIC BLOOD PRESSURE - MUSE: NORMAL MMHG
EGFRCR SERPLBLD CKD-EPI 2021: 35 ML/MIN/1.73M2
ERYTHROCYTE [DISTWIDTH] IN BLOOD BY AUTOMATED COUNT: 13.8 % (ref 10–15)
GLUCOSE SERPL-MCNC: 92 MG/DL (ref 70–99)
GLUCOSE UR STRIP-MCNC: NEGATIVE MG/DL
HCO3 SERPL-SCNC: 18 MMOL/L (ref 22–29)
HCT VFR BLD AUTO: 34.7 % (ref 35–47)
HGB BLD-MCNC: 10.9 G/DL (ref 11.7–15.7)
HGB UR QL STRIP: NEGATIVE
HOLD SPECIMEN: NORMAL
HYALINE CASTS: 1 /LPF
INTERPRETATION ECG - MUSE: NORMAL
KETONES UR STRIP-MCNC: NEGATIVE MG/DL
LEUKOCYTE ESTERASE UR QL STRIP: NEGATIVE
MAGNESIUM SERPL-MCNC: 2.1 MG/DL (ref 1.7–2.3)
MCH RBC QN AUTO: 31 PG (ref 26.5–33)
MCHC RBC AUTO-ENTMCNC: 31.4 G/DL (ref 31.5–36.5)
MCV RBC AUTO: 99 FL (ref 78–100)
NITRATE UR QL: NEGATIVE
P AXIS - MUSE: 4 DEGREES
PH UR STRIP: 5 [PH] (ref 5–7)
PLATELET # BLD AUTO: 293 10E3/UL (ref 150–450)
POTASSIUM SERPL-SCNC: 5.6 MMOL/L (ref 3.4–5.3)
PR INTERVAL - MUSE: 158 MS
QRS DURATION - MUSE: 72 MS
QT - MUSE: 394 MS
QTC - MUSE: 489 MS
R AXIS - MUSE: 12 DEGREES
RBC # BLD AUTO: 3.52 10E6/UL (ref 3.8–5.2)
RBC URINE: <1 /HPF
SODIUM SERPL-SCNC: 133 MMOL/L (ref 135–145)
SP GR UR STRIP: 1.02 (ref 1–1.03)
SQUAMOUS EPITHELIAL: 2 /HPF
SYSTOLIC BLOOD PRESSURE - MUSE: NORMAL MMHG
T AXIS - MUSE: 42 DEGREES
UROBILINOGEN UR STRIP-MCNC: NORMAL MG/DL
VENTRICULAR RATE- MUSE: 93 BPM
WBC # BLD AUTO: 11 10E3/UL (ref 4–11)
WBC URINE: 1 /HPF

## 2024-11-08 PROCEDURE — 85014 HEMATOCRIT: CPT | Performed by: EMERGENCY MEDICINE

## 2024-11-08 PROCEDURE — 36415 COLL VENOUS BLD VENIPUNCTURE: CPT | Performed by: EMERGENCY MEDICINE

## 2024-11-08 PROCEDURE — 85027 COMPLETE CBC AUTOMATED: CPT | Performed by: EMERGENCY MEDICINE

## 2024-11-08 PROCEDURE — 258N000003 HC RX IP 258 OP 636: Performed by: EMERGENCY MEDICINE

## 2024-11-08 PROCEDURE — 93005 ELECTROCARDIOGRAM TRACING: CPT

## 2024-11-08 PROCEDURE — 96361 HYDRATE IV INFUSION ADD-ON: CPT

## 2024-11-08 PROCEDURE — 96360 HYDRATION IV INFUSION INIT: CPT

## 2024-11-08 PROCEDURE — 80048 BASIC METABOLIC PNL TOTAL CA: CPT | Performed by: EMERGENCY MEDICINE

## 2024-11-08 PROCEDURE — 82947 ASSAY GLUCOSE BLOOD QUANT: CPT | Performed by: EMERGENCY MEDICINE

## 2024-11-08 PROCEDURE — 83735 ASSAY OF MAGNESIUM: CPT | Performed by: EMERGENCY MEDICINE

## 2024-11-08 PROCEDURE — 99285 EMERGENCY DEPT VISIT HI MDM: CPT | Mod: 25

## 2024-11-08 PROCEDURE — 81001 URINALYSIS AUTO W/SCOPE: CPT | Performed by: EMERGENCY MEDICINE

## 2024-11-08 PROCEDURE — 71046 X-RAY EXAM CHEST 2 VIEWS: CPT

## 2024-11-08 RX ADMIN — SODIUM CHLORIDE 1000 ML: 900 INJECTION, SOLUTION INTRAVENOUS at 13:53

## 2024-11-08 ASSESSMENT — COLUMBIA-SUICIDE SEVERITY RATING SCALE - C-SSRS
1. IN THE PAST MONTH, HAVE YOU WISHED YOU WERE DEAD OR WISHED YOU COULD GO TO SLEEP AND NOT WAKE UP?: NO
6. HAVE YOU EVER DONE ANYTHING, STARTED TO DO ANYTHING, OR PREPARED TO DO ANYTHING TO END YOUR LIFE?: NO
2. HAVE YOU ACTUALLY HAD ANY THOUGHTS OF KILLING YOURSELF IN THE PAST MONTH?: NO

## 2024-11-08 ASSESSMENT — ACTIVITIES OF DAILY LIVING (ADL)
ADLS_ACUITY_SCORE: 0

## 2024-11-08 NOTE — DISCHARGE INSTRUCTIONS
Please drink plenty of fluids.  Please follow up with your primary care provider in the next week.    Continue your antibiotics.  Please return to the emergency department if your symptoms worsen.

## 2024-11-08 NOTE — ED PROVIDER NOTES
"  Emergency Department Note      History of Present Illness     Chief Complaint   Fever      HPI   Katrina Portillo is a 89 year old female with history of hypertension, hyperlipidemia, hypothyroidism, CKD 3a, and UTI who presents to the ED with her son for evaluation of a fever. Katrina reports this morning she felt weak and fatigued. She was hypotensive and had a fever. She also mentions not eating well and a slight cough over the past day. Patient denies dysuria, abdominal pain, vomiting, or diarrhea. Son notes patient had her Covid and Influenza vaccines yesterday. Additionally per son, patient was diagnosed with pneumonia 4 days ago and is on Levaquin.    Independent Historian   Son as detailed above.    Review of External Notes   Reviewed patient's hospital visit on 8/19/2024, patient was seen for sepsis secondary to UTI, generalized weakness, fall.    Past Medical History     Medical History and Problem List   Heart murmur  Anemia  UTI  Osteoporosis with pathological fracture of right femur  Anxiety  Depression  DJD knee  Dyslipidemia  GERD  Hypertension  CKD IIIa  Hypothyroidism  MONCHO  Villous adenoma of right colon    Medications   Levothyroxine  Losartan  Metoprolol succinate ER  Sertraline    Surgical History   ORIF right hip nailing  D&C  Cholecystectomy  Right cataract removal IOL    Physical Exam     Patient Vitals for the past 24 hrs:   BP Temp Temp src Pulse Resp SpO2 Height Weight   11/08/24 1727 (!) 146/70 -- -- -- -- -- -- --   11/08/24 1108 97/65 97.1  F (36.2  C) Temporal 95 18 100 % 1.676 m (5' 6\") 63.5 kg (140 lb)   11/08/24 1055 -- 97.3  F (36.3  C) Temporal -- -- -- -- --     Physical Exam  General: Well-nourished, resting comfortably when I enter the room  Eyes: Pupils equal, conjunctivae pink no scleral icterus or conjunctival injection  ENT:  Moist mucus membranes  Respiratory:  Lungs clear to auscultation bilaterally, no crackles/rubs/wheezes.  Good air movement  CV: Normal rate and " rhythm, systolic murmur  GI:  Abdomen soft and non-distended.  No tenderness, guarding or rebound  Skin: Warm, dry.  No rashes or petechiae  Musculoskeletal: No peripheral edema or calf tenderness  Neuro: Alert and oriented to person/place/time  Psychiatric: Normal affect     Diagnostics     Lab Results   Labs Ordered and Resulted from Time of ED Arrival to Time of ED Departure   CBC WITH PLATELETS - Abnormal       Result Value    WBC Count 11.0      RBC Count 3.52 (*)     Hemoglobin 10.9 (*)     Hematocrit 34.7 (*)     MCV 99      MCH 31.0      MCHC 31.4 (*)     RDW 13.8      Platelet Count 293     BASIC METABOLIC PANEL - Abnormal    Sodium 133 (*)     Potassium 5.6 (*)     Chloride 103      Carbon Dioxide (CO2) 18 (*)     Anion Gap 12      Urea Nitrogen 28.5 (*)     Creatinine 1.39 (*)     GFR Estimate 35 (*)     Calcium 9.2      Glucose 92     ROUTINE UA WITH MICROSCOPIC REFLEX TO CULTURE - Abnormal    Color Urine Light Yellow      Appearance Urine Clear      Glucose Urine Negative      Bilirubin Urine Negative      Ketones Urine Negative      Specific Gravity Urine 1.019      Blood Urine Negative      pH Urine 5.0      Protein Albumin Urine Negative      Urobilinogen Urine Normal      Nitrite Urine Negative      Leukocyte Esterase Urine Negative      Bacteria Urine Many (*)     RBC Urine <1      WBC Urine 1      Squamous Epithelials Urine 2 (*)     Hyaline Casts Urine 1     MAGNESIUM - Normal    Magnesium 2.1         Imaging   XR Chest 2 Views   Final Result   IMPRESSION: Stable cardiac silhouette. Calcifications of the thoracic   aorta. Similar scattered areas of linear scarring and atelectasis   without new airspace consolidation. No pleural effusion or   pneumothorax. Redemonstrated thoracolumbar compression fractures.      FLORENTIN MOHAMUD MD            SYSTEM ID:  [id number]          EKG   ECG taken at 1348, ECG read at 1352  Normal sinus rhythm  Low voltage QRS  Borderline ECG   Premature atrial complexes  no longer present and nonspecific T wave now evident in anterior leads as compared to prior, dated 8/19/2024.  Rate 93 bpm. SC interval 158 ms. QRS duration 72 ms. QT/QTc 394/489 ms. P-R-T axes 4 12 42.     Independent Interpretation   Chest x-ray does not show any consolidation.    ED Course      Medications Administered   Medications   sodium chloride 0.9% BOLUS 1,000 mL (0 mLs Intravenous Stopped 11/8/24 1754)       Procedures   Procedures     Discussion of Management   None    ED Course   ED Course as of 11/08/24 1923   Fri Nov 08, 2024   1336 I obtained history and examined the patient as noted above.    1723 I rechecked the patient and explained findings.        Additional Documentation  None    Medical Decision Making / Diagnosis     CMS Diagnoses: None    MIPS       None    [facility]   Katrina Portillo is a 89 year old female with a history of hypertension and hypothyroidism presenting to the emergency department with a complaint of fatigue.  Patient's son reports that today at her care home they noticed that she had a low blood pressure.  She was diagnosed with pneumonia 4 days ago, and has been on Levaquin.  On exam patient is well-appearing.  She does not have any current complaints.  No lower extremity swelling.  Lung sounds are clear.  Patient's blood pressure is 97/65.  She is afebrile.  She is not tachycardic.  Workup shows a stable hemoglobin.  Creatinine is slightly elevated from her baseline at 1.39 from 1.24, patient is given a liter of fluid.  Chest x-ray does not show any new consolidations or worsening pneumonia.  Electrolytes are within acceptable limits.  Urinalysis does not show any signs of infection.  Potassium is slightly elevated, but it is hemolyzed.  Patient is given a liter of fluid, she is feeling much better.  Her blood pressure has improved.  She states that she would like to go home.  There is no sign of systemic infection.  I advised her to continue with her antibiotics.  She is able  to get up and walk out with her walker.  No lightheadedness.  She states that she feels back to her baseline.  Patient's son is at bedside and agrees with this plan.  Patient is discharged home.    Disposition   The patient was discharged.     Diagnosis     ICD-10-CM    1. Generalized weakness  R53.1            Discharge Medications   New Prescriptions    No medications on file         Scribe Disclosure:  I, Gabrielle Cordero, am serving as a scribe at 2:09 PM on 11/8/2024 to document services personally performed by Hetal Simpson DO based on my observations and the provider's statements to me.        Hetal Simpson MD  11/08/24 6728

## 2024-11-08 NOTE — ED TRIAGE NOTES
"Patient had flu and Covid vaccine yesterday. Woke this am and feels tired. Staff state that she appears weaker than normal and they had family bring her in for concern for sepsis.     Pneumonia diagnosis on 11/4. Started on Levofloxacin on 11/6. Was given 1gram of Tylenol at 0945 today.    Patient denies pain. Uses a wheelchair baseline. States \"I'm just tired, not weak, though.\"        "

## 2024-11-15 ENCOUNTER — LAB REQUISITION (OUTPATIENT)
Dept: LAB | Facility: CLINIC | Age: 89
End: 2024-11-15
Payer: COMMERCIAL

## 2024-11-15 DIAGNOSIS — R53.83 OTHER FATIGUE: ICD-10-CM

## 2024-11-18 LAB
ALBUMIN SERPL BCG-MCNC: 3.5 G/DL (ref 3.5–5.2)
ALP SERPL-CCNC: 104 U/L (ref 40–150)
ALT SERPL W P-5'-P-CCNC: 10 U/L (ref 0–50)
ANION GAP SERPL CALCULATED.3IONS-SCNC: 10 MMOL/L (ref 7–15)
AST SERPL W P-5'-P-CCNC: 38 U/L (ref 0–45)
BILIRUB SERPL-MCNC: 0.4 MG/DL
BUN SERPL-MCNC: 32.8 MG/DL (ref 8–23)
CALCIUM SERPL-MCNC: 9.6 MG/DL (ref 8.8–10.4)
CHLORIDE SERPL-SCNC: 104 MMOL/L (ref 98–107)
CREAT SERPL-MCNC: 1.32 MG/DL (ref 0.51–0.95)
EGFRCR SERPLBLD CKD-EPI 2021: 37 ML/MIN/1.73M2
ERYTHROCYTE [DISTWIDTH] IN BLOOD BY AUTOMATED COUNT: 13.6 % (ref 10–15)
GLUCOSE SERPL-MCNC: 93 MG/DL (ref 70–99)
HCO3 SERPL-SCNC: 24 MMOL/L (ref 22–29)
HCT VFR BLD AUTO: 34.7 % (ref 35–47)
HGB BLD-MCNC: 10.8 G/DL (ref 11.7–15.7)
MCH RBC QN AUTO: 31.4 PG (ref 26.5–33)
MCHC RBC AUTO-ENTMCNC: 31.1 G/DL (ref 31.5–36.5)
MCV RBC AUTO: 101 FL (ref 78–100)
PLATELET # BLD AUTO: 375 10E3/UL (ref 150–450)
POTASSIUM SERPL-SCNC: 4.6 MMOL/L (ref 3.4–5.3)
PROT SERPL-MCNC: 7.1 G/DL (ref 6.4–8.3)
RBC # BLD AUTO: 3.44 10E6/UL (ref 3.8–5.2)
SODIUM SERPL-SCNC: 138 MMOL/L (ref 135–145)
WBC # BLD AUTO: 9.9 10E3/UL (ref 4–11)

## 2024-11-18 PROCEDURE — 36415 COLL VENOUS BLD VENIPUNCTURE: CPT | Mod: ORL | Performed by: NURSE PRACTITIONER

## 2024-11-18 PROCEDURE — 80053 COMPREHEN METABOLIC PANEL: CPT | Mod: ORL | Performed by: NURSE PRACTITIONER

## 2024-11-18 PROCEDURE — P9603 ONE-WAY ALLOW PRORATED MILES: HCPCS | Mod: ORL | Performed by: NURSE PRACTITIONER

## 2024-11-18 PROCEDURE — 85027 COMPLETE CBC AUTOMATED: CPT | Mod: ORL | Performed by: NURSE PRACTITIONER

## 2024-12-09 ENCOUNTER — TELEPHONE (OUTPATIENT)
Dept: CARDIOLOGY | Facility: CLINIC | Age: 89
End: 2024-12-09
Payer: COMMERCIAL

## 2024-12-09 NOTE — TELEPHONE ENCOUNTER
Called to schedule 6 mon echo TAVR     LVM with patient    Called daughter to see if she helps to schedule mother's appointments. Daughter stated that they are deciding to decline at this time due to patients age, and they dont feel that it is something they would like to pursue. Did let her know that I will inform my team and they may reach out just to document the declination    Daughter stated that was fine and she will wait for the call.     Richa Balderas  Structural Heart Procedure   [facility]/ [facility]    
Appropriate

## 2025-07-07 ENCOUNTER — LAB REQUISITION (OUTPATIENT)
Dept: LAB | Facility: CLINIC | Age: OVER 89
End: 2025-07-07
Payer: COMMERCIAL

## 2025-07-07 DIAGNOSIS — M19.012 PRIMARY OSTEOARTHRITIS, LEFT SHOULDER: ICD-10-CM

## 2025-07-07 DIAGNOSIS — G31.84 MILD COGNITIVE IMPAIRMENT OF UNCERTAIN OR UNKNOWN ETIOLOGY: ICD-10-CM

## 2025-07-07 DIAGNOSIS — M17.0 BILATERAL PRIMARY OSTEOARTHRITIS OF KNEE: ICD-10-CM

## 2025-07-07 DIAGNOSIS — F39 UNSPECIFIED MOOD (AFFECTIVE) DISORDER: ICD-10-CM

## 2025-07-07 DIAGNOSIS — E03.9 HYPOTHYROIDISM, UNSPECIFIED: ICD-10-CM

## 2025-07-14 LAB
ANION GAP SERPL CALCULATED.3IONS-SCNC: 11 MMOL/L (ref 7–15)
BUN SERPL-MCNC: 28.3 MG/DL (ref 8–23)
CALCIUM SERPL-MCNC: 9.5 MG/DL (ref 8.8–10.4)
CHLORIDE SERPL-SCNC: 103 MMOL/L (ref 98–107)
CREAT SERPL-MCNC: 1.2 MG/DL (ref 0.51–0.95)
EGFRCR SERPLBLD CKD-EPI 2021: 41 ML/MIN/1.73M2
GLUCOSE SERPL-MCNC: 82 MG/DL (ref 70–99)
HCO3 SERPL-SCNC: 25 MMOL/L (ref 22–29)
POTASSIUM SERPL-SCNC: 4.2 MMOL/L (ref 3.4–5.3)
SODIUM SERPL-SCNC: 139 MMOL/L (ref 135–145)
TSH SERPL DL<=0.005 MIU/L-ACNC: 1.46 UIU/ML (ref 0.3–4.2)

## 2025-07-14 PROCEDURE — 80048 BASIC METABOLIC PNL TOTAL CA: CPT | Mod: ORL | Performed by: NURSE PRACTITIONER

## 2025-07-14 PROCEDURE — 36415 COLL VENOUS BLD VENIPUNCTURE: CPT | Mod: ORL | Performed by: NURSE PRACTITIONER

## 2025-07-14 PROCEDURE — 84443 ASSAY THYROID STIM HORMONE: CPT | Mod: ORL | Performed by: NURSE PRACTITIONER

## 2025-07-14 PROCEDURE — P9603 ONE-WAY ALLOW PRORATED MILES: HCPCS | Mod: ORL | Performed by: NURSE PRACTITIONER

## 2025-09-04 ENCOUNTER — HOSPITAL ENCOUNTER (EMERGENCY)
Facility: CLINIC | Age: OVER 89
Discharge: HOME | End: 2025-09-04
Attending: EMERGENCY MEDICINE
Payer: COMMERCIAL

## 2025-09-04 VITALS
RESPIRATION RATE: 18 BRPM | SYSTOLIC BLOOD PRESSURE: 123 MMHG | OXYGEN SATURATION: 96 % | HEART RATE: 88 BPM | TEMPERATURE: 98.4 F | DIASTOLIC BLOOD PRESSURE: 78 MMHG

## 2025-09-04 DIAGNOSIS — E86.0 DEHYDRATION: ICD-10-CM

## 2025-09-04 DIAGNOSIS — N18.31 CHRONIC KIDNEY DISEASE, STAGE 3A (H): ICD-10-CM

## 2025-09-04 DIAGNOSIS — R41.89 SPELL OF ALTERED COGNITION: Primary | ICD-10-CM

## 2025-09-04 LAB
ALBUMIN SERPL BCG-MCNC: 3.4 G/DL (ref 3.5–5.2)
ALBUMIN UR-MCNC: 20 MG/DL
ALP SERPL-CCNC: 235 U/L (ref 40–150)
ALT SERPL W P-5'-P-CCNC: 23 U/L (ref 0–50)
ANION GAP SERPL CALCULATED.3IONS-SCNC: 15 MMOL/L (ref 7–15)
APPEARANCE UR: ABNORMAL
AST SERPL W P-5'-P-CCNC: 40 U/L (ref 0–45)
ATRIAL RATE - MUSE: 108 BPM
BACTERIA #/AREA URNS HPF: ABNORMAL /HPF
BILIRUB SERPL-MCNC: 0.9 MG/DL
BILIRUB UR QL STRIP: NEGATIVE
BUN SERPL-MCNC: 39.6 MG/DL (ref 8–23)
CALCIUM SERPL-MCNC: 9.3 MG/DL (ref 8.8–10.4)
CHLORIDE SERPL-SCNC: 106 MMOL/L (ref 98–107)
COLOR UR AUTO: YELLOW
CREAT SERPL-MCNC: 1.3 MG/DL (ref 0.51–0.95)
DIASTOLIC BLOOD PRESSURE - MUSE: NORMAL MMHG
EGFRCR SERPLBLD CKD-EPI 2021: 37 ML/MIN/1.73M2
ERYTHROCYTE [DISTWIDTH] IN BLOOD BY AUTOMATED COUNT: 13.9 % (ref 10–15)
GLUCOSE SERPL-MCNC: 118 MG/DL (ref 70–99)
GLUCOSE UR STRIP-MCNC: NEGATIVE MG/DL
HCO3 SERPL-SCNC: 18 MMOL/L (ref 22–29)
HCT VFR BLD AUTO: 36 % (ref 35–47)
HGB BLD-MCNC: 11.5 G/DL (ref 11.7–15.7)
HGB UR QL STRIP: ABNORMAL
HOLD SPECIMEN: NORMAL
INTERPRETATION ECG - MUSE: NORMAL
KETONES UR STRIP-MCNC: NEGATIVE MG/DL
LACTATE SERPL-SCNC: 1.8 MMOL/L (ref 0.7–2)
LEUKOCYTE ESTERASE UR QL STRIP: ABNORMAL
MCH RBC QN AUTO: 30.7 PG (ref 26.5–33)
MCHC RBC AUTO-ENTMCNC: 31.9 G/DL (ref 31.5–36.5)
MCV RBC AUTO: 96.3 FL (ref 78–100)
MUCOUS THREADS #/AREA URNS LPF: PRESENT /LPF
NITRATE UR QL: NEGATIVE
P AXIS - MUSE: 75 DEGREES
PH UR STRIP: 5 [PH] (ref 5–7)
PLATELET # BLD AUTO: 325 10E3/UL (ref 150–450)
POTASSIUM SERPL-SCNC: 5.2 MMOL/L (ref 3.4–5.3)
PR INTERVAL - MUSE: 144 MS
PROT SERPL-MCNC: 7.5 G/DL (ref 6.4–8.3)
QRS DURATION - MUSE: 68 MS
QT - MUSE: 366 MS
QTC - MUSE: 490 MS
R AXIS - MUSE: -4 DEGREES
RBC # BLD AUTO: 3.74 10E6/UL (ref 3.8–5.2)
RBC URINE: 2 /HPF
SODIUM SERPL-SCNC: 139 MMOL/L (ref 135–145)
SP GR UR STRIP: 1.02 (ref 1–1.03)
SQUAMOUS EPITHELIAL: 3 /HPF
SYSTOLIC BLOOD PRESSURE - MUSE: NORMAL MMHG
T AXIS - MUSE: 38 DEGREES
TROPONIN T SERPL HS-MCNC: 71 NG/L
TROPONIN T SERPL HS-MCNC: 77 NG/L
TSH SERPL DL<=0.005 MIU/L-ACNC: 3.93 UIU/ML (ref 0.3–4.2)
UROBILINOGEN UR STRIP-MCNC: 2 MG/DL
VENTRICULAR RATE- MUSE: 108 BPM
WBC # BLD AUTO: 24.76 10E3/UL (ref 4–11)
WBC URINE: 5 /HPF

## 2025-09-04 PROCEDURE — 81001 URINALYSIS AUTO W/SCOPE: CPT | Performed by: EMERGENCY MEDICINE

## 2025-09-04 PROCEDURE — 82040 ASSAY OF SERUM ALBUMIN: CPT | Performed by: EMERGENCY MEDICINE

## 2025-09-04 PROCEDURE — 85014 HEMATOCRIT: CPT | Performed by: EMERGENCY MEDICINE

## 2025-09-04 PROCEDURE — 36415 COLL VENOUS BLD VENIPUNCTURE: CPT | Performed by: EMERGENCY MEDICINE

## 2025-09-04 PROCEDURE — 84484 ASSAY OF TROPONIN QUANT: CPT | Performed by: EMERGENCY MEDICINE

## 2025-09-04 PROCEDURE — 83605 ASSAY OF LACTIC ACID: CPT | Performed by: EMERGENCY MEDICINE

## 2025-09-04 PROCEDURE — 84443 ASSAY THYROID STIM HORMONE: CPT | Performed by: EMERGENCY MEDICINE

## 2025-09-04 PROCEDURE — 258N000003 HC RX IP 258 OP 636: Performed by: EMERGENCY MEDICINE

## 2025-09-04 RX ADMIN — SODIUM CHLORIDE 1000 ML: 0.9 INJECTION, SOLUTION INTRAVENOUS at 11:35

## 2025-09-04 ASSESSMENT — ACTIVITIES OF DAILY LIVING (ADL)
ADLS_ACUITY_SCORE: 58

## (undated) DEVICE — DRSG ADAPTIC 3X8" 6113

## (undated) DEVICE — PREP CHLORAPREP 26ML TINTED ORANGE  260815

## (undated) DEVICE — DRAPE C-ARM 60X42" 1013

## (undated) DEVICE — GLOVE PROTEXIS BLUE W/NEU-THERA 8.5  2D73EB85

## (undated) DEVICE — CAST PADDING 4" COTTON WEBRIL UNSTERILE 9084

## (undated) DEVICE — DRILL BIT QUICK COUPLING 3 FLUTE 4.2MMX145MM NDL  POINT

## (undated) DEVICE — WIRE GUIDE 3.2X400MM  357.399

## (undated) DEVICE — CAST PADDING 4" UNSTERILE 9044

## (undated) DEVICE — DRSG TEGADERM 6X8" 1628

## (undated) DEVICE — GLOVE PROTEXIS POWDER FREE 8.5 ORTHOPEDIC 2D73ET85

## (undated) DEVICE — SU VICRYL 2-0 CT-2 27" UND J269H

## (undated) DEVICE — ESU GROUND PAD UNIVERSAL W/O CORD

## (undated) DEVICE — DRSG TEGADERM 4X4 3/4" 1626

## (undated) DEVICE — PACK HIP NAILING SOP15HNSB

## (undated) DEVICE — LINEN TOWEL PACK X5 5464

## (undated) DEVICE — GLOVE PROTEXIS POWDER FREE 6.5 ORTHOPEDIC 2D73ET65

## (undated) DEVICE — KIT PATIENT CARE HANA TABLE PROFX SUPINE 6855

## (undated) DEVICE — SU VICRYL 2-0 X-1 27" UND J459H

## (undated) DEVICE — SU VICRYL 0 UR-6 27" J603H

## (undated) DEVICE — SOL WATER IRRIG 1000ML BOTTLE 2F7114

## (undated) DEVICE — STPL SKIN 35W ROTATING HEAD PRW35

## (undated) DEVICE — SOL NACL 0.9% IRRIG 1000ML BOTTLE 2F7124

## (undated) DEVICE — ROD SYN REAMER BALL TIP 2.5X950MM  351.706S

## (undated) RX ORDER — CEFAZOLIN SODIUM 1 G/3ML
INJECTION, POWDER, FOR SOLUTION INTRAMUSCULAR; INTRAVENOUS
Status: DISPENSED
Start: 2021-10-17

## (undated) RX ORDER — ONDANSETRON 2 MG/ML
INJECTION INTRAMUSCULAR; INTRAVENOUS
Status: DISPENSED
Start: 2021-10-17

## (undated) RX ORDER — PROPOFOL 10 MG/ML
INJECTION, EMULSION INTRAVENOUS
Status: DISPENSED
Start: 2021-10-17

## (undated) RX ORDER — FENTANYL CITRATE 50 UG/ML
INJECTION, SOLUTION INTRAMUSCULAR; INTRAVENOUS
Status: DISPENSED
Start: 2021-10-17

## (undated) RX ORDER — BUPIVACAINE HYDROCHLORIDE AND EPINEPHRINE 2.5; 5 MG/ML; UG/ML
INJECTION, SOLUTION EPIDURAL; INFILTRATION; INTRACAUDAL; PERINEURAL
Status: DISPENSED
Start: 2021-10-17

## (undated) RX ORDER — DEXAMETHASONE SODIUM PHOSPHATE 4 MG/ML
INJECTION, SOLUTION INTRA-ARTICULAR; INTRALESIONAL; INTRAMUSCULAR; INTRAVENOUS; SOFT TISSUE
Status: DISPENSED
Start: 2021-10-17